# Patient Record
Sex: FEMALE | Race: WHITE | NOT HISPANIC OR LATINO | Employment: OTHER | URBAN - METROPOLITAN AREA
[De-identification: names, ages, dates, MRNs, and addresses within clinical notes are randomized per-mention and may not be internally consistent; named-entity substitution may affect disease eponyms.]

---

## 2017-01-02 DIAGNOSIS — E03.9 ACQUIRED HYPOTHYROIDISM: ICD-10-CM

## 2017-01-03 RX ORDER — LEVOTHYROXINE SODIUM 112 UG/1
TABLET ORAL
Qty: 30 TABLET | Refills: 0 | Status: SHIPPED | OUTPATIENT
Start: 2017-01-03 | End: 2017-02-08 | Stop reason: SDUPTHER

## 2017-02-01 DIAGNOSIS — E78.00 HYPERCHOLESTEROLEMIA: Primary | ICD-10-CM

## 2017-02-01 DIAGNOSIS — E03.9 ACQUIRED HYPOTHYROIDISM: ICD-10-CM

## 2017-02-01 DIAGNOSIS — E55.9 VITAMIN D DEFICIENCY: ICD-10-CM

## 2017-02-01 LAB
25(OH)D3+25(OH)D2 SERPL-MCNC: 32.5 NG/ML
ALBUMIN SERPL-MCNC: 4.4 G/DL (ref 3.5–5.2)
ALBUMIN/GLOB SERPL: 1.8 G/DL
ALP SERPL-CCNC: 81 U/L (ref 40–129)
ALT SERPL-CCNC: 20 U/L (ref 5–33)
AST SERPL-CCNC: 21 U/L (ref 5–32)
BASOPHILS # BLD AUTO: 0.05 10*3/MM3 (ref 0–0.2)
BASOPHILS NFR BLD AUTO: 0.6 % (ref 0–2)
BILIRUB SERPL-MCNC: 0.4 MG/DL (ref 0.2–1.2)
BUN SERPL-MCNC: 14 MG/DL (ref 8–23)
BUN/CREAT SERPL: 20.6 (ref 7–25)
CALCIUM SERPL-MCNC: 9.6 MG/DL (ref 8.8–10.5)
CHLORIDE SERPL-SCNC: 100 MMOL/L (ref 98–107)
CHOLEST SERPL-MCNC: 196 MG/DL (ref 0–200)
CO2 SERPL-SCNC: 28.1 MMOL/L (ref 22–29)
CREAT SERPL-MCNC: 0.68 MG/DL (ref 0.57–1)
EOSINOPHIL # BLD AUTO: 0.47 10*3/MM3 (ref 0.1–0.3)
EOSINOPHIL NFR BLD AUTO: 6 % (ref 0–4)
ERYTHROCYTE [DISTWIDTH] IN BLOOD BY AUTOMATED COUNT: 13.6 % (ref 11.5–14.5)
GLOBULIN SER CALC-MCNC: 2.5 GM/DL
GLUCOSE SERPL-MCNC: 94 MG/DL (ref 65–99)
HCT VFR BLD AUTO: 42.5 % (ref 37–47)
HDLC SERPL-MCNC: 74 MG/DL (ref 40–60)
HGB BLD-MCNC: 13.4 G/DL (ref 12–16)
IMM GRANULOCYTES # BLD: 0.04 10*3/MM3 (ref 0–0.03)
IMM GRANULOCYTES NFR BLD: 0.5 % (ref 0–0.5)
LDLC SERPL CALC-MCNC: 99 MG/DL (ref 0–100)
LDLC/HDLC SERPL: 1.34 {RATIO}
LYMPHOCYTES # BLD AUTO: 2.36 10*3/MM3 (ref 0.6–4.8)
LYMPHOCYTES NFR BLD AUTO: 30.2 % (ref 20–45)
MCH RBC QN AUTO: 28.3 PG (ref 27–31)
MCHC RBC AUTO-ENTMCNC: 31.5 G/DL (ref 31–37)
MCV RBC AUTO: 89.9 FL (ref 81–99)
MONOCYTES # BLD AUTO: 0.62 10*3/MM3 (ref 0–1)
MONOCYTES NFR BLD AUTO: 7.9 % (ref 3–8)
NEUTROPHILS # BLD AUTO: 4.28 10*3/MM3 (ref 1.5–8.3)
NEUTROPHILS NFR BLD AUTO: 54.8 % (ref 45–70)
NRBC BLD AUTO-RTO: 0 /100 WBC (ref 0–0)
PLATELET # BLD AUTO: 308 10*3/MM3 (ref 140–500)
POTASSIUM SERPL-SCNC: 4.6 MMOL/L (ref 3.5–5.2)
PROT SERPL-MCNC: 6.9 G/DL (ref 6–8.5)
RBC # BLD AUTO: 4.73 10*6/MM3 (ref 4.2–5.4)
SODIUM SERPL-SCNC: 142 MMOL/L (ref 136–145)
TRIGL SERPL-MCNC: 113 MG/DL (ref 0–150)
TSH SERPL DL<=0.005 MIU/L-ACNC: 1.16 MIU/ML (ref 0.27–4.2)
VLDLC SERPL CALC-MCNC: 22.6 MG/DL (ref 7–27)
WBC # BLD AUTO: 7.82 10*3/MM3 (ref 4.8–10.8)

## 2017-02-06 DIAGNOSIS — E03.9 ACQUIRED HYPOTHYROIDISM: ICD-10-CM

## 2017-02-07 RX ORDER — LEVOTHYROXINE SODIUM 112 UG/1
TABLET ORAL
Qty: 30 TABLET | Refills: 0 | OUTPATIENT
Start: 2017-02-07

## 2017-02-08 DIAGNOSIS — E03.9 ACQUIRED HYPOTHYROIDISM: ICD-10-CM

## 2017-02-08 RX ORDER — LEVOTHYROXINE SODIUM 112 UG/1
TABLET ORAL
Qty: 30 TABLET | Refills: 0 | Status: SHIPPED | OUTPATIENT
Start: 2017-02-08 | End: 2017-03-10 | Stop reason: SDUPTHER

## 2017-02-15 ENCOUNTER — OFFICE VISIT (OUTPATIENT)
Dept: FAMILY MEDICINE CLINIC | Facility: CLINIC | Age: 67
End: 2017-02-15

## 2017-02-15 VITALS
BODY MASS INDEX: 31.99 KG/M2 | RESPIRATION RATE: 16 BRPM | SYSTOLIC BLOOD PRESSURE: 124 MMHG | DIASTOLIC BLOOD PRESSURE: 66 MMHG | OXYGEN SATURATION: 99 % | WEIGHT: 192 LBS | TEMPERATURE: 98.4 F | HEART RATE: 70 BPM | HEIGHT: 65 IN

## 2017-02-15 DIAGNOSIS — E03.9 ACQUIRED HYPOTHYROIDISM: ICD-10-CM

## 2017-02-15 DIAGNOSIS — R42 DIZZINESS: ICD-10-CM

## 2017-02-15 DIAGNOSIS — I49.9 IRREGULAR HEART BEAT: ICD-10-CM

## 2017-02-15 DIAGNOSIS — E78.00 HYPERCHOLESTEROLEMIA: Primary | ICD-10-CM

## 2017-02-15 DIAGNOSIS — K21.00 GASTROESOPHAGEAL REFLUX DISEASE WITH ESOPHAGITIS: ICD-10-CM

## 2017-02-15 PROCEDURE — 93000 ELECTROCARDIOGRAM COMPLETE: CPT | Performed by: PHYSICIAN ASSISTANT

## 2017-02-15 PROCEDURE — 99214 OFFICE O/P EST MOD 30 MIN: CPT | Performed by: PHYSICIAN ASSISTANT

## 2017-02-15 RX ORDER — ESOMEPRAZOLE MAGNESIUM 40 MG/1
40 CAPSULE, DELAYED RELEASE ORAL
Qty: 90 CAPSULE | Refills: 3 | Status: SHIPPED | OUTPATIENT
Start: 2017-02-15 | End: 2017-08-16 | Stop reason: SDUPTHER

## 2017-02-15 NOTE — PROGRESS NOTES
"Subjective   Hilaria Klein is a 66 y.o. female.   Chief Complaint   Patient presents with   • Hypothyroidism   • Follow-up     Lab review       History of Present Illness   Hilaria is a 66 year old female since for hypothyroidism management.  She has lost 3 pounds since July 25, 2016. Doing well with her medications.  She has been complaint on her medications.    Hilaria had one episode of heart flutters,dizziness and felt nauseated last month.  States that she has not had any other symptoms since then.  She drinks 30 oz of coffee a day.  She has been taking her Nexium. Denied any shortness of air,vision changes,swelling of ankles, fatigue or current chest pain.  Appetite has been have been normal.  Sleep has been normal.        The following portions of the patient's history were reviewed and updated as appropriate: allergies, current medications, past family history, past medical history, past social history and past surgical history.    Review of Systems   Constitutional: Negative for chills, fatigue and fever.   Respiratory: Negative for cough, shortness of breath and wheezing.    Cardiovascular: Positive for palpitations. Negative for chest pain.   Gastrointestinal: Positive for nausea. Negative for constipation, diarrhea and vomiting.   Neurological: Positive for dizziness and light-headedness. Negative for headaches.   Psychiatric/Behavioral: Negative for sleep disturbance and suicidal ideas.   All other systems reviewed and are negative.    Vitals:    02/15/17 1425   BP: 124/66   BP Location: Right arm   Patient Position: Sitting   Cuff Size: Adult   Pulse: 70   Resp: 16   Temp: 98.4 °F (36.9 °C)   TempSrc: Oral   SpO2: 99%   Weight: 192 lb (87.1 kg)   Height: 65\" (165.1 cm)     BP Readings from Last 3 Encounters:   02/15/17 124/66   07/25/16 138/88   06/21/16 145/73       Wt Readings from Last 3 Encounters:   02/15/17 192 lb (87.1 kg)   07/25/16 195 lb 1.6 oz (88.5 kg)   06/21/16 200 lb (90.7 kg) "     Body mass index is 31.95 kg/(m^2).    Allergies   Allergen Reactions   • Penicillins    • Sulfa Antibiotics        Objective   Physical Exam   Constitutional: She is oriented to person, place, and time. Vital signs are normal. She appears well-developed and well-nourished.   HENT:   Head: Normocephalic and atraumatic.   Right Ear: Hearing, tympanic membrane, external ear and ear canal normal.   Left Ear: Hearing, tympanic membrane, external ear and ear canal normal.   Nose: Nose normal. Right sinus exhibits no maxillary sinus tenderness and no frontal sinus tenderness. Left sinus exhibits no maxillary sinus tenderness and no frontal sinus tenderness.   Mouth/Throat: Uvula is midline, oropharynx is clear and moist and mucous membranes are normal.   Eyes: Conjunctivae, EOM and lids are normal. Pupils are equal, round, and reactive to light.   Neck: Trachea normal and phonation normal. Neck supple. Carotid bruit is not present. No edema present. No thyromegaly present.   Cardiovascular: Normal rate, regular rhythm, S1 normal, S2 normal, normal heart sounds and normal pulses.    No murmur heard.  Pulmonary/Chest: Effort normal and breath sounds normal.   Abdominal: Soft. Normal appearance, normal aorta and bowel sounds are normal. There is no hepatomegaly. There is no tenderness.   Lymphadenopathy:     She has no cervical adenopathy.   Neurological: She is alert and oriented to person, place, and time.   Skin: Skin is warm, dry and intact.   Psychiatric: She has a normal mood and affect. Her speech is normal and behavior is normal. Judgment and thought content normal. Cognition and memory are normal.         ECG 12 Lead  Date/Time: 2/15/2017 3:03 PM  Performed by: LEE BLACK  Authorized by: LEE BLACK   Comparison: not compared with previous ECG   Previous ECG: no previous ECG available  Rhythm: sinus rhythm  Rate: normal  BPM: 61  Conduction: conduction normal  ST Segments: ST segments normal  T  Waves: T waves normal  QRS axis: normal  Clinical impression: normal ECG  Comments: Indication: Dizziness with irregular heartbeat  P/HI:  114/182 ms  QRS:  82 ms  QT/Qtc:  388/391 ms            Assessment/Plan   Hilaria was seen today for hypothyroidism and follow-up.    Diagnoses and all orders for this visit:    Hypercholesterolemia    Gastroesophageal reflux disease with esophagitis  -     esomeprazole (nexIUM) 40 MG capsule; Take 1 capsule by mouth Every Morning Before Breakfast.    Acquired hypothyroidism    Dizziness  -     ECG 12 Lead    Irregular heart beat  -     ECG 12 Lead    1.  Chronic hypercholesterolemia: I have discussed her lab work with her at today's office visit.  Her cholesterol readings have improved.  She was encouraged to continue eating healthy and exercising routinely.  Hilaria will return to office in 6 months for fasting blood work.  2.  Chronic GERD: She is doing well with her Nexium prescription.  I have refilled this to pharmacy.  I have asked Hilaria to decrease her caffeine intake.  She voiced understanding.  3.  Chronic acquired hypothyroidism: Discussed her TSH with her at today's office visit.  This is currently at goal.  She will continue her thyroid medication at home as directed.  No refills are required at this time.  Plan to repeat her TSH in one year.  4.  New dizziness with irregular heartbeat: Hilaria had one episode of dizziness, nausea with irregular heart rate approximately 4 weeks ago.  She is concerned that maybe she is developing cardiac issues.  She had a normal EKG at today's office visit.  I suspect this might have been related to waking up quickly from a dream.  We will monitor for now since no family history of CAD/MI.  Will return to office if symptoms reoccur.  I'll consider possible referral to cardiology in future if warranted.    No visits with results within 2 Week(s) from this visit.  Latest known visit with results is:    Orders Only on 02/01/2017    Component Date Value Ref Range Status   • WBC 02/01/2017 7.82  4.80 - 10.80 10*3/mm3 Final   • RBC 02/01/2017 4.73  4.20 - 5.40 10*6/mm3 Final   • Hemoglobin 02/01/2017 13.4  12.0 - 16.0 g/dL Final   • Hematocrit 02/01/2017 42.5  37.0 - 47.0 % Final   • MCV 02/01/2017 89.9  81.0 - 99.0 fL Final   • MCH 02/01/2017 28.3  27.0 - 31.0 pg Final   • MCHC 02/01/2017 31.5  31.0 - 37.0 g/dL Final   • RDW 02/01/2017 13.6  11.5 - 14.5 % Final   • Platelets 02/01/2017 308  140 - 500 10*3/mm3 Final   • Neutrophil Rel % 02/01/2017 54.8  45.0 - 70.0 % Final   • Lymphocyte Rel % 02/01/2017 30.2  20.0 - 45.0 % Final   • Monocyte Rel % 02/01/2017 7.9  3.0 - 8.0 % Final   • Eosinophil Rel % 02/01/2017 6.0* 0.0 - 4.0 % Final   • Basophil Rel % 02/01/2017 0.6  0.0 - 2.0 % Final   • Neutrophils Absolute 02/01/2017 4.28  1.50 - 8.30 10*3/mm3 Final   • Lymphocytes Absolute 02/01/2017 2.36  0.60 - 4.80 10*3/mm3 Final   • Monocytes Absolute 02/01/2017 0.62  0.00 - 1.00 10*3/mm3 Final   • Eosinophils Absolute 02/01/2017 0.47* 0.10 - 0.30 10*3/mm3 Final   • Basophils Absolute 02/01/2017 0.05  0.00 - 0.20 10*3/mm3 Final   • Immature Granulocyte Rel % 02/01/2017 0.5  0.0 - 0.5 % Final   • Immature Grans Absolute 02/01/2017 0.04* 0.00 - 0.03 10*3/mm3 Final   • nRBC 02/01/2017 0.0  0.0 - 0.0 /100 WBC Final   • Glucose 02/01/2017 94  65 - 99 mg/dL Final   • BUN 02/01/2017 14  8 - 23 mg/dL Final   • Creatinine 02/01/2017 0.68  0.57 - 1.00 mg/dL Final   • eGFR Non  Am 02/01/2017 87  >60 mL/min/1.73 Final   • eGFR African Am 02/01/2017 106  >60 mL/min/1.73 Final   • BUN/Creatinine Ratio 02/01/2017 20.6  7.0 - 25.0 Final   • Sodium 02/01/2017 142  136 - 145 mmol/L Final   • Potassium 02/01/2017 4.6  3.5 - 5.2 mmol/L Final   • Chloride 02/01/2017 100  98 - 107 mmol/L Final   • Total CO2 02/01/2017 28.1  22.0 - 29.0 mmol/L Final   • Calcium 02/01/2017 9.6  8.8 - 10.5 mg/dL Final   • Total Protein 02/01/2017 6.9  6.0 - 8.5 g/dL Final   • Albumin  02/01/2017 4.40  3.50 - 5.20 g/dL Final   • Globulin 02/01/2017 2.5  gm/dL Final   • A/G Ratio 02/01/2017 1.8  g/dL Final   • Total Bilirubin 02/01/2017 0.4  0.2 - 1.2 mg/dL Final   • Alkaline Phosphatase 02/01/2017 81  40 - 129 U/L Final   • AST (SGOT) 02/01/2017 21  5 - 32 U/L Final   • ALT (SGPT) 02/01/2017 20  5 - 33 U/L Final   • Total Cholesterol 02/01/2017 196  0 - 200 mg/dL Final   • Triglycerides 02/01/2017 113  0 - 150 mg/dL Final   • HDL Cholesterol 02/01/2017 74* 40 - 60 mg/dL Final   • VLDL Cholesterol 02/01/2017 22.6  7 - 27 mg/dL Final   • LDL Cholesterol  02/01/2017 99  0 - 100 mg/dL Final   • LDL/HDL Ratio 02/01/2017 1.34   Final   • TSH 02/01/2017 1.160  0.270 - 4.200 mIU/mL Final   • 25 Hydroxy, Vitamin D 02/01/2017 32.5  ng/mL Final    Comment: Reference Range for Total Vitamin D 25(OH)  Deficiency    <20.0 ng/mL  Insufficiency 21-29 ng/mL  Sufficiency   30-74 ng/mL             Dragon transcription disclaimer    Much of this encounter note is an electronic transcription/translation of spoken language to printed text.  The electronic translation of spoken language may permit erroneous, or at times, nonsensical words or phrases to be inadvertently transcribed.  Although I have reviewed the note for such errors, some may still exist.    Tennille Lebron PA-C  Family Practice

## 2017-02-16 PROBLEM — R42 DIZZINESS: Status: ACTIVE | Noted: 2017-02-16

## 2017-02-16 PROBLEM — I49.9 IRREGULAR HEART BEAT: Status: ACTIVE | Noted: 2017-02-16

## 2017-03-10 DIAGNOSIS — E03.9 ACQUIRED HYPOTHYROIDISM: ICD-10-CM

## 2017-03-13 RX ORDER — LEVOTHYROXINE SODIUM 112 UG/1
TABLET ORAL
Qty: 30 TABLET | Refills: 0 | Status: SHIPPED | OUTPATIENT
Start: 2017-03-13 | End: 2017-04-11 | Stop reason: SDUPTHER

## 2017-04-11 DIAGNOSIS — E03.9 ACQUIRED HYPOTHYROIDISM: ICD-10-CM

## 2017-04-12 RX ORDER — LEVOTHYROXINE SODIUM 112 UG/1
TABLET ORAL
Qty: 30 TABLET | Refills: 0 | Status: SHIPPED | OUTPATIENT
Start: 2017-04-12 | End: 2017-05-18 | Stop reason: SDUPTHER

## 2017-04-24 RX ORDER — SIMVASTATIN 40 MG
TABLET ORAL
Qty: 30 TABLET | Refills: 6 | Status: SHIPPED | OUTPATIENT
Start: 2017-04-24 | End: 2017-08-16 | Stop reason: SDUPTHER

## 2017-05-18 DIAGNOSIS — E03.9 ACQUIRED HYPOTHYROIDISM: ICD-10-CM

## 2017-05-19 RX ORDER — LEVOTHYROXINE SODIUM 112 UG/1
TABLET ORAL
Qty: 30 TABLET | Refills: 0 | Status: SHIPPED | OUTPATIENT
Start: 2017-05-19 | End: 2017-06-19 | Stop reason: SDUPTHER

## 2017-06-19 DIAGNOSIS — E03.9 ACQUIRED HYPOTHYROIDISM: ICD-10-CM

## 2017-06-19 RX ORDER — LEVOTHYROXINE SODIUM 112 UG/1
TABLET ORAL
Qty: 30 TABLET | Refills: 0 | Status: SHIPPED | OUTPATIENT
Start: 2017-06-19 | End: 2017-07-20 | Stop reason: SDUPTHER

## 2017-07-17 ENCOUNTER — TELEPHONE (OUTPATIENT)
Dept: FAMILY MEDICINE CLINIC | Facility: CLINIC | Age: 67
End: 2017-07-17

## 2017-07-17 DIAGNOSIS — E03.9 ACQUIRED HYPOTHYROIDISM: ICD-10-CM

## 2017-07-17 DIAGNOSIS — Z11.59 NEED FOR HEPATITIS C SCREENING TEST: ICD-10-CM

## 2017-07-17 DIAGNOSIS — E78.00 HYPERCHOLESTEROLEMIA: Primary | ICD-10-CM

## 2017-07-17 NOTE — TELEPHONE ENCOUNTER
Notify patient that I have placed her orders for fasting blood work to be performed at the hospital.  I have included hepatitis C.

## 2017-07-19 ENCOUNTER — LAB (OUTPATIENT)
Dept: LAB | Facility: HOSPITAL | Age: 67
End: 2017-07-19

## 2017-07-19 ENCOUNTER — TELEPHONE (OUTPATIENT)
Dept: FAMILY MEDICINE CLINIC | Facility: CLINIC | Age: 67
End: 2017-07-19

## 2017-07-19 DIAGNOSIS — E78.00 HYPERCHOLESTEROLEMIA: ICD-10-CM

## 2017-07-19 DIAGNOSIS — Z11.59 NEED FOR HEPATITIS C SCREENING TEST: ICD-10-CM

## 2017-07-19 DIAGNOSIS — E03.9 ACQUIRED HYPOTHYROIDISM: ICD-10-CM

## 2017-07-19 LAB
ALBUMIN SERPL-MCNC: 4.5 G/DL (ref 3.5–5.2)
ALBUMIN/GLOB SERPL: 1.5 G/DL
ALP SERPL-CCNC: 80 U/L (ref 40–129)
ALT SERPL W P-5'-P-CCNC: 24 U/L (ref 5–33)
ANION GAP SERPL CALCULATED.3IONS-SCNC: 13.1 MMOL/L
AST SERPL-CCNC: 22 U/L (ref 5–32)
BILIRUB SERPL-MCNC: 0.6 MG/DL (ref 0.2–1.2)
BUN BLD-MCNC: 11 MG/DL (ref 8–23)
BUN/CREAT SERPL: 13.9 (ref 7–25)
CALCIUM SPEC-SCNC: 9.3 MG/DL (ref 8.8–10.5)
CHLORIDE SERPL-SCNC: 101 MMOL/L (ref 98–107)
CHOLEST SERPL-MCNC: 165 MG/DL (ref 0–200)
CO2 SERPL-SCNC: 24.9 MMOL/L (ref 22–29)
CREAT BLD-MCNC: 0.79 MG/DL (ref 0.57–1)
GFR SERPL CREATININE-BSD FRML MDRD: 73 ML/MIN/1.73
GLOBULIN UR ELPH-MCNC: 3.1 GM/DL
GLUCOSE BLD-MCNC: 96 MG/DL (ref 65–99)
HCV AB SER DONR QL: NORMAL
HDLC SERPL-MCNC: 65 MG/DL (ref 40–60)
LDLC SERPL CALC-MCNC: 80 MG/DL (ref 0–100)
LDLC/HDLC SERPL: 1.24 {RATIO}
POTASSIUM BLD-SCNC: 4.5 MMOL/L (ref 3.5–5.2)
PROT SERPL-MCNC: 7.6 G/DL (ref 6–8.5)
SODIUM BLD-SCNC: 139 MMOL/L (ref 136–145)
TRIGL SERPL-MCNC: 98 MG/DL (ref 0–150)
TSH SERPL DL<=0.05 MIU/L-ACNC: 3.34 MIU/ML (ref 0.27–4.2)
VLDLC SERPL-MCNC: 19.6 MG/DL (ref 7–27)

## 2017-07-19 PROCEDURE — 80053 COMPREHEN METABOLIC PANEL: CPT

## 2017-07-19 PROCEDURE — 80061 LIPID PANEL: CPT

## 2017-07-19 PROCEDURE — 86803 HEPATITIS C AB TEST: CPT

## 2017-07-19 PROCEDURE — 84443 ASSAY THYROID STIM HORMONE: CPT

## 2017-07-19 NOTE — TELEPHONE ENCOUNTER
----- Message from Tennille Lebron PA-C sent at 7/19/2017  5:13 PM EDT -----  1.  Notify patient that her hepatitis C testing was nonreactive.  2.  TSH was normal at 3.3.  Please continue current dose of thyroid medication.  3.  Cholesterol 165, triglycerides 98, HDL 65 and LDL was 80.  Please continue current medication at home.  4.  Fasting blood sugar was normal at 96.  Please keep appointment in August.

## 2017-07-20 DIAGNOSIS — E03.9 ACQUIRED HYPOTHYROIDISM: ICD-10-CM

## 2017-07-20 RX ORDER — LEVOTHYROXINE SODIUM 112 UG/1
TABLET ORAL
Qty: 30 TABLET | Refills: 0 | Status: SHIPPED | OUTPATIENT
Start: 2017-07-20 | End: 2017-08-16 | Stop reason: SDUPTHER

## 2017-08-07 ENCOUNTER — TELEPHONE (OUTPATIENT)
Dept: FAMILY MEDICINE CLINIC | Facility: CLINIC | Age: 67
End: 2017-08-07

## 2017-08-07 NOTE — TELEPHONE ENCOUNTER
Just had labs done end of July.  Nothing needed at this time. Keep Appointment for OV in August.

## 2017-08-16 ENCOUNTER — OFFICE VISIT (OUTPATIENT)
Dept: FAMILY MEDICINE CLINIC | Facility: CLINIC | Age: 67
End: 2017-08-16

## 2017-08-16 VITALS
HEART RATE: 74 BPM | SYSTOLIC BLOOD PRESSURE: 142 MMHG | OXYGEN SATURATION: 100 % | HEIGHT: 65 IN | RESPIRATION RATE: 16 BRPM | WEIGHT: 184 LBS | BODY MASS INDEX: 30.66 KG/M2 | TEMPERATURE: 98.1 F | DIASTOLIC BLOOD PRESSURE: 78 MMHG

## 2017-08-16 DIAGNOSIS — E03.9 ACQUIRED HYPOTHYROIDISM: ICD-10-CM

## 2017-08-16 DIAGNOSIS — Z12.11 ENCOUNTER FOR SCREENING COLONOSCOPY: ICD-10-CM

## 2017-08-16 DIAGNOSIS — Z01.419 PAP SMEAR, AS PART OF ROUTINE GYNECOLOGICAL EXAMINATION: ICD-10-CM

## 2017-08-16 DIAGNOSIS — E78.00 HYPERCHOLESTEROLEMIA: ICD-10-CM

## 2017-08-16 DIAGNOSIS — Z12.31 SCREENING MAMMOGRAM, ENCOUNTER FOR: ICD-10-CM

## 2017-08-16 DIAGNOSIS — Z78.0 POST-MENOPAUSAL: ICD-10-CM

## 2017-08-16 DIAGNOSIS — Z00.00 ENCOUNTER FOR ANNUAL PHYSICAL EXAM: Primary | ICD-10-CM

## 2017-08-16 DIAGNOSIS — K21.00 GASTROESOPHAGEAL REFLUX DISEASE WITH ESOPHAGITIS: ICD-10-CM

## 2017-08-16 LAB
DEVELOPER EXPIRATION DATE: NORMAL
DEVELOPER LOT NUMBER: NORMAL
EXPIRATION DATE: NORMAL
FECAL OCCULT BLOOD SCREEN, POC: NEGATIVE
Lab: NORMAL
NEGATIVE CONTROL: NEGATIVE
POSITIVE CONTROL: POSITIVE

## 2017-08-16 PROCEDURE — 82274 ASSAY TEST FOR BLOOD FECAL: CPT | Performed by: PHYSICIAN ASSISTANT

## 2017-08-16 PROCEDURE — 99397 PER PM REEVAL EST PAT 65+ YR: CPT | Performed by: PHYSICIAN ASSISTANT

## 2017-08-16 RX ORDER — SIMVASTATIN 40 MG
40 TABLET ORAL NIGHTLY
Qty: 90 TABLET | Refills: 3 | Status: SHIPPED | OUTPATIENT
Start: 2017-08-16 | End: 2018-08-15 | Stop reason: SDUPTHER

## 2017-08-16 RX ORDER — LEVOTHYROXINE SODIUM 112 UG/1
112 TABLET ORAL DAILY
Qty: 90 TABLET | Refills: 3 | Status: SHIPPED | OUTPATIENT
Start: 2017-08-16 | End: 2018-08-15 | Stop reason: SDUPTHER

## 2017-08-16 RX ORDER — ESOMEPRAZOLE MAGNESIUM 40 MG/1
40 CAPSULE, DELAYED RELEASE ORAL
Qty: 90 CAPSULE | Refills: 3 | Status: SHIPPED | OUTPATIENT
Start: 2017-08-16 | End: 2018-08-15 | Stop reason: SDUPTHER

## 2017-08-16 NOTE — PROGRESS NOTES
"Subjective   Hilaria Klein is a 67 y.o. female.   Chief Complaint   Patient presents with   • Hyperlipidemia     management   • Hypothyroidism     management   • Annual Exam     pap       History of Present Illness     Hilaria  is a 67-year-old female who presents for annual physical examination with Pap smear, hypothyroidism and hyperlipidemia management.  Hilaria has lost 8 pounds since February last mammogram was August 2016.  Denied any breast mass/discharge,vaginal discharge,dysuria,URI symptoms,chest pain,shortness of air,wheezing,dizziness,or swelling of ankles.  Appetite has been healthier.  Sleep has been normal. She has been complaint on her medications.  Shingles shot was several years ago at [facility].  15, 2017. Feeling well and has no complaints. She can't remember when her last colonoscopy.  Bowel  movements are daily without dark black tarry stools.     The following portions of the patient's history were reviewed and updated as appropriate: allergies, current medications, past family history, past medical history, past social history and past surgical history.    Review of Systems   Constitutional: Negative.    HENT: Negative.    Eyes: Negative.    Respiratory: Negative.    Cardiovascular: Negative.    Gastrointestinal: Negative.    Endocrine: Negative.    Genitourinary: Negative.    Musculoskeletal: Negative.    Skin: Negative.    Allergic/Immunologic: Negative.    Neurological: Negative.    Hematological: Negative.    Psychiatric/Behavioral: Negative.    All other systems reviewed and are negative.    Vitals:    08/16/17 1354   BP: 142/78   BP Location: Right arm   Patient Position: Sitting   Cuff Size: Adult   Pulse: 74   Resp: 16   Temp: 98.1 °F (36.7 °C)   TempSrc: Oral   SpO2: 100%   Weight: 184 lb (83.5 kg)   Height: 65\" (165.1 cm)     Wt Readings from Last 3 Encounters:   08/16/17 184 lb (83.5 kg)   02/15/17 192 lb (87.1 kg)   07/25/16 195 lb 1.6 oz (88.5 kg)       BP Readings from Last 3 " Encounters:   08/16/17 142/78   02/15/17 124/66   07/25/16 138/88     Body mass index is 30.62 kg/(m^2).  Allergies   Allergen Reactions   • Penicillins    • Sulfa Antibiotics        Objective   Physical Exam   Constitutional: She is oriented to person, place, and time. Vital signs are normal. She appears well-developed and well-nourished.   HENT:   Head: Normocephalic and atraumatic.   Right Ear: Hearing, tympanic membrane, external ear and ear canal normal.   Left Ear: Hearing, tympanic membrane, external ear and ear canal normal.   Nose: Nose normal. Right sinus exhibits no maxillary sinus tenderness and no frontal sinus tenderness. Left sinus exhibits no maxillary sinus tenderness and no frontal sinus tenderness.   Mouth/Throat: Uvula is midline, oropharynx is clear and moist and mucous membranes are normal.   Eyes: Conjunctivae, EOM and lids are normal. Pupils are equal, round, and reactive to light.   Neck: Trachea normal and phonation normal. Neck supple. No edema present. No thyroid mass and no thyromegaly present.   Cardiovascular: Normal rate, regular rhythm, S1 normal, S2 normal, normal heart sounds and normal pulses.    Pulmonary/Chest: Effort normal and breath sounds normal. Right breast exhibits no inverted nipple, no mass, no nipple discharge, no skin change and no tenderness. Left breast exhibits no inverted nipple, no mass, no nipple discharge, no skin change and no tenderness. Breasts are symmetrical. There is no breast swelling.   Abdominal: Soft. Normal appearance, normal aorta and bowel sounds are normal. There is no hepatomegaly. There is no tenderness. Hernia confirmed negative in the right inguinal area and confirmed negative in the left inguinal area.   Genitourinary: Rectum normal, vagina normal and uterus normal. Rectal exam shows guaiac negative stool. No breast tenderness, discharge or bleeding. Pelvic exam was performed with patient supine. There is no rash, tenderness, lesion or injury  on the right labia. There is no rash, tenderness, lesion or injury on the left labia. Cervix exhibits no motion tenderness and no discharge. Right adnexum displays no mass, no tenderness and no fullness. Left adnexum displays no mass, no tenderness and no fullness.   Genitourinary Comments: Exam was chaperoned by Natali Shi LPN   Lymphadenopathy:     She has no cervical adenopathy.        Right: No inguinal adenopathy present.        Left: No inguinal adenopathy present.   Neurological: She is alert and oriented to person, place, and time. She has normal reflexes.   Skin: Skin is warm, dry and intact.   Psychiatric: She has a normal mood and affect. Her speech is normal and behavior is normal. Judgment and thought content normal. Cognition and memory are normal.     Results for orders placed or performed in visit on 08/16/17   POC Occult Blood Stool   Result Value Ref Range    Fecal Occult Blood Negative Negative    Lot Number [id number]     Expiration Date 7746640     DEVELOPER LOT NUMBER [id number]     DEVELOPER EXPIRATION DATE 6184553     Positive Control Positive Positive    Negative Control Negative Negative     Assessment/Plan   Hilaria was seen today for hyperlipidemia, hypothyroidism and annual exam.    Diagnoses and all orders for this visit:    Encounter for annual physical exam    Pap smear, as part of routine gynecological examination  -     Cancel: POC Occult Blood Stool  -     Pap IG, Rfx HPV All Pth  -     POC Occult Blood Stool    Acquired hypothyroidism  -     levothyroxine (SYNTHROID, LEVOTHROID) 112 MCG tablet; Take 1 tablet by mouth Daily.    Gastroesophageal reflux disease with esophagitis  -     esomeprazole (nexIUM) 40 MG capsule; Take 1 capsule by mouth Every Morning Before Breakfast.  -     Ambulatory Referral to Gastroenterology    Hypercholesterolemia  -     simvastatin (ZOCOR) 40 MG tablet; Take 1 tablet by mouth Every Night.    Screening mammogram, encounter for  -     Mammo Screening  Bilateral With CAD; Future    Encounter for screening colonoscopy  -     Ambulatory Referral to Gastroenterology    Post-menopausal  -     DEXA Bone Density Axial; Future      1.  Annual physical examination with Pap smear: I have reviewed her lab work with her at today's office visit.  Her in office Hemoccult was negative.  Pap smear was collected and sent to the laboratory for further evaluation.  Hilaria will be notified of test results when completed.  2.  Stable hypothyroidism: I have reviewed her TSH results with her at today's office visit.  I have refilled her Synthroid medication to pharmacy.  She will return to office in 6 months for reevaluation.  3.  Chronic GERD: Doing well with her Nexium prescription.  I have refilled this to pharmacy.  I will schedule a referral to GI specialist, Dr. Elena Shabazz for screening colonoscopy.  4.  Stable hypercholesterolemia: Doing well with her Zocor prescription.  I have sent this to pharmacy.  Her lipid profile results were discussed and have improved.  She was given praise for weight loss and eating healthy.  Plan to repeat fasting blood work in 6 months.  5.  Need for screening mammogram and DEXA scan: Hilaria is postmenopausal.  I have placed a referral for mammogram and DEXA scan to performed at [facility].  She will be notified of test results when completed.          Office Visit on 08/16/2017   Component Date Value Ref Range Status   • Fecal Occult Blood 08/16/2017 Negative  Negative Final   • Lot Number 08/16/2017 [id number]   Final   • Expiration Date 08/16/2017 1213905   Final   • DEVELOPER LOT NUMBER 08/16/2017 [id number]   Final   • DEVELOPER EXPIRATION DATE 08/16/2017 [id number]   Final   • Positive Control 08/16/2017 Positive  Positive Final   • Negative Control 08/16/2017 Negative  Negative Final     Dragon transcription disclaimer    Much of this encounter note is an electronic transcription/translation of spoken language to printed text.  The  electronic translation of spoken language may permit erroneous, or at times, nonsensical words or phrases to be inadvertently transcribed.  Although I have reviewed the note for such errors, some may still exist.    Tennille Lebron PA-C  Family Practice

## 2017-08-18 ENCOUNTER — TELEPHONE (OUTPATIENT)
Dept: FAMILY MEDICINE CLINIC | Facility: CLINIC | Age: 67
End: 2017-08-18

## 2017-08-18 LAB
CONV .: NORMAL
CYTOLOGIST CVX/VAG CYTO: NORMAL
CYTOLOGY CVX/VAG DOC THIN PREP: NORMAL
DX ICD CODE: NORMAL
HIV 1 & 2 AB SER-IMP: NORMAL
OTHER STN SPEC: NORMAL
PATH REPORT.FINAL DX SPEC: NORMAL
STAT OF ADQ CVX/VAG CYTO-IMP: NORMAL

## 2017-08-18 NOTE — TELEPHONE ENCOUNTER
----- Message from Tennille Lebron PA-C sent at 8/18/2017 11:07 AM EDT -----  Notify patient that Pap smear was normal.

## 2017-08-23 ENCOUNTER — APPOINTMENT (OUTPATIENT)
Dept: BONE DENSITY | Facility: HOSPITAL | Age: 67
End: 2017-08-23

## 2017-08-23 ENCOUNTER — HOSPITAL ENCOUNTER (OUTPATIENT)
Dept: MAMMOGRAPHY | Facility: HOSPITAL | Age: 67
Discharge: HOME | End: 2017-08-23
Admitting: PHYSICIAN ASSISTANT

## 2017-08-23 DIAGNOSIS — Z78.0 POST-MENOPAUSAL: ICD-10-CM

## 2017-08-23 DIAGNOSIS — Z12.31 SCREENING MAMMOGRAM, ENCOUNTER FOR: ICD-10-CM

## 2017-08-23 PROCEDURE — 77063 BREAST TOMOSYNTHESIS BI: CPT

## 2017-08-23 PROCEDURE — G0202 SCR MAMMO BI INCL CAD: HCPCS

## 2017-08-23 PROCEDURE — 77080 DXA BONE DENSITY AXIAL: CPT

## 2017-08-24 ENCOUNTER — TELEPHONE (OUTPATIENT)
Dept: FAMILY MEDICINE CLINIC | Facility: CLINIC | Age: 67
End: 2017-08-24

## 2017-08-24 NOTE — TELEPHONE ENCOUNTER
----- Message from Tennille Lebron PA-C sent at 8/24/2017  5:23 AM EDT -----  Notify patient that her DEXA Scan was normal.

## 2017-08-24 NOTE — TELEPHONE ENCOUNTER
----- Message from Tennille Lebron PA-C sent at 8/24/2017  5:23 AM EDT -----  Notify patient that her mammogram was normal.  Repeat in 1 year.

## 2017-08-30 ENCOUNTER — OFFICE VISIT (OUTPATIENT)
Dept: GASTROENTEROLOGY | Facility: CLINIC | Age: 67
End: 2017-08-30

## 2017-08-30 VITALS
SYSTOLIC BLOOD PRESSURE: 136 MMHG | DIASTOLIC BLOOD PRESSURE: 76 MMHG | BODY MASS INDEX: 30.72 KG/M2 | HEIGHT: 65 IN | WEIGHT: 184.4 LBS

## 2017-08-30 DIAGNOSIS — Z12.11 ENCOUNTER FOR SCREENING FOR MALIGNANT NEOPLASM OF COLON: ICD-10-CM

## 2017-08-30 DIAGNOSIS — K21.00 GASTROESOPHAGEAL REFLUX DISEASE WITH ESOPHAGITIS: Primary | ICD-10-CM

## 2017-08-30 PROCEDURE — 99203 OFFICE O/P NEW LOW 30 MIN: CPT | Performed by: INTERNAL MEDICINE

## 2017-08-30 NOTE — PROGRESS NOTES
PATIENT INFORMATION  Hilaria Klein       - 1950    CHIEF COMPLAINT  Chief Complaint   Patient presents with   • Heartburn       HISTORY OF PRESENT ILLNESS  Heartburn     66 yo with GERD for over 10 years currently maintained on Nexium. She denies any dysphagia or odynophagia. No weight loss. No previous EGD. Symptoms aggravated only be eating late. Severity currently is mild. She is also needing cls. Last cls was over 5 years ago and polyps were noter.    No cardiac or pulmonary issues.    REVIEW OF SYSTEMS  Review of Systems   Gastrointestinal:        REFLUX   All other systems reviewed and are negative.        ACTIVE PROBLEMS  Patient Active Problem List    Diagnosis   • Screening mammogram, encounter for [Z12.31]   • Encounter for screening colonoscopy [Z12.11]   • Post-menopausal [Z78.0]   • Irregular heart beat [I49.9]   • Dizziness [R42]   • Gastroesophageal reflux disease with esophagitis [K21.0]   • Hypercholesterolemia [E78.00]   • Acquired hypothyroidism [E03.9]   • Pruritus of vagina [L29.8]   • Vitamin D deficiency [E55.9]   • Encounter for annual physical exam [Z00.00]   • Pap smear, as part of routine gynecological examination [Z01.419]   • Neoplasm of uncertain behavior [D48.9]         PAST MEDICAL HISTORY  Past Medical History:   Diagnosis Date   • Goiter          SURGICAL HISTORY  Past Surgical History:   Procedure Laterality Date   • APPENDECTOMY           FAMILY HISTORY  Family History   Problem Relation Age of Onset   • Hypertension Other    • Deep vein thrombosis Other    • Breast cancer Neg Hx          SOCIAL HISTORY  Social History     Occupational History   • Not on file.     Social History Main Topics   • Smoking status: Never Smoker   • Smokeless tobacco: Not on file   • Alcohol use No   • Drug use: Not on file   • Sexual activity: Not on file         CURRENT MEDICATIONS    Current Outpatient Prescriptions:   •  Cholecalciferol (VITAMIN D3) 5000 units capsule capsule, Take  "5,000 Units by mouth Daily., Disp: , Rfl:   •  esomeprazole (nexIUM) 40 MG capsule, Take 1 capsule by mouth Every Morning Before Breakfast., Disp: 90 capsule, Rfl: 3  •  levothyroxine (SYNTHROID, LEVOTHROID) 112 MCG tablet, Take 1 tablet by mouth Daily., Disp: 90 tablet, Rfl: 3  •  Multiple Vitamin (MULTI VITAMIN DAILY PO), Take  by mouth., Disp: , Rfl:   •  simvastatin (ZOCOR) 40 MG tablet, Take 1 tablet by mouth Every Night., Disp: 90 tablet, Rfl: 3    ALLERGIES  Penicillins and Sulfa antibiotics    VITALS  Vitals:    08/30/17 1422   BP: 136/76   Weight: 184 lb 6.4 oz (83.6 kg)   Height: 65\" (165.1 cm)       LAST RESULTS   Office Visit on 08/16/2017   Component Date Value Ref Range Status   • Diagnosis 08/16/2017 Comment   Final    NEGATIVE FOR INTRAEPITHELIAL LESION AND MALIGNANCY.   • Specimen adequacy: 08/16/2017 Comment   Final    Comment: Satisfactory for evaluation.  Endocervical component may not be  distinguished in cases of atrophy.     • Clinician Provided ICD-10: 08/16/2017 Comment   Final    Z01.419   • Performed by: 08/16/2017 Comment   Final    Jose Guadalupe Farley, Cytotechnologist (ASCP)   • . 08/16/2017 .   Final   • Note: 08/16/2017 Comment   Final    Comment: The Pap smear is a screening test designed to aid in the detection of  premalignant and malignant conditions of the uterine cervix.  It is not a  diagnostic procedure and should not be used as the sole means of detecting  cervical cancer.  Both false-positive and false-negative reports do occur.     • Method: 08/16/2017 Comment   Final    Comment: This liquid based ThinPrep(R) pap test was screened with the  use of an image guided system.     • Conv .conv 08/16/2017 Comment   Final    Comment: The HPV DNA reflex criteria were not met with this specimen result  therefore, no HPV testing was performed.     • Fecal Occult Blood 08/16/2017 Negative  Negative Final   • Lot Number 08/16/2017 [id number]   Final   • Expiration Date 08/16/2017 [id number]   Final "   • DEVELOPER LOT NUMBER 08/16/2017 [id number]   Final   • DEVELOPER EXPIRATION DATE 08/16/2017 [id number]   Final   • Positive Control 08/16/2017 Positive  Positive Final   • Negative Control 08/16/2017 Negative  Negative Final     Dexa Bone Density Axial    Result Date: 8/23/2017  Narrative: DEXA BONE DENSITY AXIAL-: 8/23/2017 3:12 PM  CLINICAL HISTORY: Routine screening in a postmenopausal female.  FINDINGS: The total bone mineral density of the lumbar spine (L1-L4) is calculated at  1.221 g/cm2.  This correlates with a T-score of 1.6 and a Z-score of 3.4.  This is classified as normal bone mineralization. 16.9% increase since 2011.  The bone mineral density of the proximal left femoral neck is calculated at  0.823 g/cm2.  This correlates with a T-score of -0.2 and a Z-score of 1.3.  This is classified as normal bone mineralization. 6.4% decrease since 2011.      Impression: Normal bone mineralization .  This report was finalized on 8/23/2017 3:38 PM by Dr. Chapin Landa MD.      Mammo Screening Digital Tomosynthesis Bilateral With Cad    Result Date: 8/23/2017  Narrative: EXAM, 8/23/2017 2:15 PM: 1. Bilateral digital screening mammogram with CAD. 2. Bilateral digital screening breast tomosynthesis.  INDICATION: 67 years-old asymptomatic female with no personal or family history of breast cancer.  TECHNIQUE: Bilateral digital screening mammogram images were obtained and reviewed with CAD. Digital breast tomosynthesis images were also reviewed.  FINDINGS:  The breast tissues are generally fatty replaced.  No suspicious mass, microcalcifications, or architectural distortion. The exam is compared to prior mammogram(s) dated 08/12/2016.      Impression: Negative mammogram.  BI-RADS CATEGORY 1: Negative.  RECOMMENDATIONS: Annual screening mammogram.  Women over the age of 40 undergoing screening mammography are entered into a reminder system with target due date for the next mammogram.  This report was finalized on 8/23/2017  2:37 PM by Dr. Chapin Landa MD.        PHYSICAL EXAM  Physical Exam   Constitutional: She is oriented to person, place, and time. She appears well-developed and well-nourished. No distress.   HENT:   Head: Normocephalic and atraumatic.   Mouth/Throat: Oropharynx is clear and moist.   Eyes: EOM are normal. Pupils are equal, round, and reactive to light.   Neck: Normal range of motion. No tracheal deviation present.   Cardiovascular: Normal rate, regular rhythm, normal heart sounds and intact distal pulses.  Exam reveals no gallop and no friction rub.    No murmur heard.  Pulmonary/Chest: Effort normal and breath sounds normal. No stridor. No respiratory distress. She has no wheezes. She has no rales. She exhibits no tenderness.   Abdominal: Soft. Bowel sounds are normal. She exhibits no distension. There is no tenderness. There is no rebound and no guarding.   Musculoskeletal: She exhibits no edema.   Lymphadenopathy:     She has no cervical adenopathy.   Neurological: She is alert and oriented to person, place, and time.   Skin: Skin is warm. She is not diaphoretic.   Psychiatric: She has a normal mood and affect. Her behavior is normal. Judgment and thought content normal.   Nursing note and vitals reviewed.      ASSESSMENT  Diagnoses and all orders for this visit:    Gastroesophageal reflux disease with esophagitis  -     Case Request; Standing  -     Case Request    Encounter for screening for malignant neoplasm of colon  -     Case Request; Standing  -     Case Request    Other orders  -     Implement Anesthesia orders day of procedure.; Standing  -     Obtain informed consent; Standing          PLAN  No Follow-up on file.      Antireflux measures and dietary modifications reviewed. Low acid diet reviewed. Keep head of bed elevated. Stop eating/drinking at least 3 hours prior to bedtime. Eliminate caffeine and carbonated beverages.  Weight loss encouraged if BMI over 25.      Risks, benefits and alternatives  discussed including but not limited to the complications of bleeding, perforation and sedation related problems.

## 2017-10-27 ENCOUNTER — ANESTHESIA EVENT (OUTPATIENT)
Dept: PERIOP | Facility: HOSPITAL | Age: 67
End: 2017-10-27

## 2017-10-30 ENCOUNTER — ANESTHESIA (OUTPATIENT)
Dept: PERIOP | Facility: HOSPITAL | Age: 67
End: 2017-10-30

## 2017-10-30 ENCOUNTER — HOSPITAL ENCOUNTER (OUTPATIENT)
Facility: HOSPITAL | Age: 67
Setting detail: HOSPITAL OUTPATIENT SURGERY
Discharge: HOME | End: 2017-10-30
Attending: INTERNAL MEDICINE | Admitting: INTERNAL MEDICINE

## 2017-10-30 VITALS
TEMPERATURE: 97.5 F | SYSTOLIC BLOOD PRESSURE: 151 MMHG | HEART RATE: 71 BPM | HEIGHT: 65 IN | BODY MASS INDEX: 29.96 KG/M2 | DIASTOLIC BLOOD PRESSURE: 81 MMHG | RESPIRATION RATE: 16 BRPM | OXYGEN SATURATION: 96 % | WEIGHT: 179.8 LBS

## 2017-10-30 DIAGNOSIS — K21.00 GASTROESOPHAGEAL REFLUX DISEASE WITH ESOPHAGITIS: ICD-10-CM

## 2017-10-30 DIAGNOSIS — Z12.11 ENCOUNTER FOR SCREENING FOR MALIGNANT NEOPLASM OF COLON: ICD-10-CM

## 2017-10-30 PROCEDURE — 43239 EGD BIOPSY SINGLE/MULTIPLE: CPT | Performed by: INTERNAL MEDICINE

## 2017-10-30 PROCEDURE — 45380 COLONOSCOPY AND BIOPSY: CPT | Performed by: INTERNAL MEDICINE

## 2017-10-30 PROCEDURE — 25010000002 PROPOFOL 10 MG/ML EMULSION: Performed by: NURSE ANESTHETIST, CERTIFIED REGISTERED

## 2017-10-30 RX ORDER — SODIUM CHLORIDE 0.9 % (FLUSH) 0.9 %
1-10 SYRINGE (ML) INJECTION AS NEEDED
Status: DISCONTINUED | OUTPATIENT
Start: 2017-10-30 | End: 2017-10-30 | Stop reason: HOSPADM

## 2017-10-30 RX ORDER — GLYCOPYRROLATE 0.2 MG/ML
INJECTION INTRAMUSCULAR; INTRAVENOUS AS NEEDED
Status: DISCONTINUED | OUTPATIENT
Start: 2017-10-30 | End: 2017-10-30 | Stop reason: SURG

## 2017-10-30 RX ORDER — SODIUM CHLORIDE 9 MG/ML
40 INJECTION, SOLUTION INTRAVENOUS AS NEEDED
Status: DISCONTINUED | OUTPATIENT
Start: 2017-10-30 | End: 2017-10-30 | Stop reason: HOSPADM

## 2017-10-30 RX ORDER — SODIUM CHLORIDE, SODIUM LACTATE, POTASSIUM CHLORIDE, CALCIUM CHLORIDE 600; 310; 30; 20 MG/100ML; MG/100ML; MG/100ML; MG/100ML
9 INJECTION, SOLUTION INTRAVENOUS CONTINUOUS PRN
Status: DISCONTINUED | OUTPATIENT
Start: 2017-10-30 | End: 2017-10-30 | Stop reason: HOSPADM

## 2017-10-30 RX ORDER — PROPOFOL 10 MG/ML
VIAL (ML) INTRAVENOUS AS NEEDED
Status: DISCONTINUED | OUTPATIENT
Start: 2017-10-30 | End: 2017-10-30 | Stop reason: SURG

## 2017-10-30 RX ORDER — PROPOFOL 10 MG/ML
VIAL (ML) INTRAVENOUS CONTINUOUS PRN
Status: DISCONTINUED | OUTPATIENT
Start: 2017-10-30 | End: 2017-10-30 | Stop reason: SURG

## 2017-10-30 RX ORDER — LIDOCAINE HYDROCHLORIDE 20 MG/ML
INJECTION, SOLUTION INFILTRATION; PERINEURAL AS NEEDED
Status: DISCONTINUED | OUTPATIENT
Start: 2017-10-30 | End: 2017-10-30 | Stop reason: SURG

## 2017-10-30 RX ORDER — LIDOCAINE HYDROCHLORIDE 10 MG/ML
0.5 INJECTION, SOLUTION EPIDURAL; INFILTRATION; INTRACAUDAL; PERINEURAL ONCE AS NEEDED
Status: COMPLETED | OUTPATIENT
Start: 2017-10-30 | End: 2017-10-30

## 2017-10-30 RX ADMIN — SODIUM CHLORIDE, POTASSIUM CHLORIDE, SODIUM LACTATE AND CALCIUM CHLORIDE: 600; 310; 30; 20 INJECTION, SOLUTION INTRAVENOUS at 10:06

## 2017-10-30 RX ADMIN — PROPOFOL 50 MG: 10 INJECTION, EMULSION INTRAVENOUS at 10:20

## 2017-10-30 RX ADMIN — LIDOCAINE HYDROCHLORIDE 50 MG: 20 INJECTION, SOLUTION INFILTRATION; PERINEURAL at 10:08

## 2017-10-30 RX ADMIN — PROPOFOL 50 MG: 10 INJECTION, EMULSION INTRAVENOUS at 10:15

## 2017-10-30 RX ADMIN — PROPOFOL 50 MG: 10 INJECTION, EMULSION INTRAVENOUS at 10:26

## 2017-10-30 RX ADMIN — LIDOCAINE HYDROCHLORIDE 0.1 ML: 10 INJECTION, SOLUTION EPIDURAL; INFILTRATION; INTRACAUDAL; PERINEURAL at 09:40

## 2017-10-30 RX ADMIN — SODIUM CHLORIDE, POTASSIUM CHLORIDE, SODIUM LACTATE AND CALCIUM CHLORIDE 9 ML/HR: 600; 310; 30; 20 INJECTION, SOLUTION INTRAVENOUS at 09:40

## 2017-10-30 RX ADMIN — PROPOFOL 50 MG: 10 INJECTION, EMULSION INTRAVENOUS at 10:08

## 2017-10-30 RX ADMIN — PROPOFOL 50 MG: 10 INJECTION, EMULSION INTRAVENOUS at 10:31

## 2017-10-30 RX ADMIN — GLYCOPYRROLATE 0.1 MG: 0.2 INJECTION INTRAMUSCULAR; INTRAVENOUS at 10:10

## 2017-10-30 RX ADMIN — PROPOFOL 100 MCG/KG/MIN: 10 INJECTION, EMULSION INTRAVENOUS at 10:07

## 2017-10-30 NOTE — ANESTHESIA POSTPROCEDURE EVALUATION
Patient: Hilaria Klein    Procedure Summary     Date Anesthesia Start Anesthesia Stop Room / Location    10/30/17 1006 1040 [facility] / [facility]       Procedure Diagnosis Surgeon Provider    ESOPHAGOGASTRODUODENOSCOPY WITH BIOPSY (N/A Esophagus); COLONOSCOPY; POLYPECTOMY (N/A ) Encounter for screening for malignant neoplasm of colon; Gastroesophageal reflux disease with esophagitis  (Encounter for screening for malignant neoplasm of colon [Z12.11]; Gastroesophageal reflux disease with esophagitis [K21.0]) MD Enrike Garcia CRNA          Anesthesia Type: MAC  Last vitals  BP   130/75 (10/30/17 1050)   Temp   97.5 °F (36.4 °C) (10/30/17 1047)   Pulse   71 (10/30/17 1050)   Resp   20 (10/30/17 1050)     SpO2   94 % (10/30/17 1050)     Post Anesthesia Care and Evaluation    Patient location during evaluation: bedside  Patient participation: complete - patient participated  Level of consciousness: awake and alert  Pain management: adequate  Airway patency: patent  Anesthetic complications: No anesthetic complications  PONV Status: controlled  Cardiovascular status: acceptable  Respiratory status: acceptable  Hydration status: acceptable

## 2017-10-30 NOTE — ANESTHESIA PREPROCEDURE EVALUATION
Anesthesia Evaluation     Patient summary reviewed and Nursing notes reviewed   no history of anesthetic complications:  NPO Solid Status: > 8 hours  NPO Liquid Status: > 8 hours     Airway   Mallampati: II  TM distance: >3 FB  Neck ROM: full  no difficulty expected  Dental      Pulmonary - negative pulmonary ROS    breath sounds clear to auscultation  Cardiovascular   Exercise tolerance: good (4-7 METS)    ECG reviewed  Rhythm: regular  Rate: normal    (+) hyperlipidemia    ROS comment: Narrative     Tennille Lebron PA-C     2/16/2017  4:56 AM    ECG 12 Lead  Date/Time: 2/15/2017 3:03 PM  Performed by: TENNILLE LEBRON  Authorized by: TENNILLE LEBRON   Comparison: not compared with previous ECG   Previous ECG: no previous ECG available  Rhythm: sinus rhythm  Rate: normal  BPM: 61  Conduction: conduction normal  ST Segments: ST segments normal  T Waves: T waves normal  QRS axis: normal  Clinical impression: normal ECG  Comments: Indication: Dizziness with irregular heartbeat  P/ME:  114/182 ms  QRS:  82 ms  QT/Qtc:  388/391 ms        Neuro/Psych- negative ROS  GI/Hepatic/Renal/Endo    (+) obesity,  GERD well controlled, hypothyroidism,     Musculoskeletal     Abdominal   (+) obese,    Substance History - negative use     OB/GYN          Other   (+) arthritis   history of cancer (skin on nose)                                    Anesthesia Plan    ASA 2     MAC     intravenous induction   Anesthetic plan and risks discussed with patient.  Use of blood products discussed with patient  Consented to blood products.

## 2017-11-01 LAB
LAB AP CASE REPORT: NORMAL
Lab: NORMAL
PATH REPORT.FINAL DX SPEC: NORMAL

## 2017-11-22 PROBLEM — K63.5 HYPERPLASTIC POLYP OF SIGMOID COLON: Status: ACTIVE | Noted: 2017-11-22

## 2018-08-09 ENCOUNTER — TELEPHONE (OUTPATIENT)
Dept: FAMILY MEDICINE CLINIC | Facility: CLINIC | Age: 68
End: 2018-08-09

## 2018-08-09 NOTE — TELEPHONE ENCOUNTER
Call patient to schedule appointment.  Last seen August 2017.  Is she currently using oxygen and how much/why?

## 2018-08-15 DIAGNOSIS — E78.00 HYPERCHOLESTEROLEMIA: ICD-10-CM

## 2018-08-15 DIAGNOSIS — E03.9 ACQUIRED HYPOTHYROIDISM: ICD-10-CM

## 2018-08-15 DIAGNOSIS — K21.00 GASTROESOPHAGEAL REFLUX DISEASE WITH ESOPHAGITIS: ICD-10-CM

## 2018-08-15 RX ORDER — SIMVASTATIN 40 MG
40 TABLET ORAL NIGHTLY
Qty: 15 TABLET | Refills: 0 | Status: SHIPPED | OUTPATIENT
Start: 2018-08-15 | End: 2018-08-30 | Stop reason: SDUPTHER

## 2018-08-15 RX ORDER — LEVOTHYROXINE SODIUM 112 UG/1
112 TABLET ORAL DAILY
Qty: 15 TABLET | Refills: 0 | Status: SHIPPED | OUTPATIENT
Start: 2018-08-15 | End: 2018-08-30 | Stop reason: DRUGHIGH

## 2018-08-15 RX ORDER — ESOMEPRAZOLE MAGNESIUM 40 MG/1
40 CAPSULE, DELAYED RELEASE ORAL
Qty: 15 CAPSULE | Refills: 0 | Status: SHIPPED | OUTPATIENT
Start: 2018-08-15 | End: 2018-08-30 | Stop reason: SDUPTHER

## 2018-08-20 DIAGNOSIS — E55.9 VITAMIN D DEFICIENCY: ICD-10-CM

## 2018-08-20 DIAGNOSIS — E03.9 ACQUIRED HYPOTHYROIDISM: ICD-10-CM

## 2018-08-20 DIAGNOSIS — E78.00 HYPERCHOLESTEROLEMIA: Primary | ICD-10-CM

## 2018-08-20 DIAGNOSIS — Z00.00 ROUTINE ADULT HEALTH MAINTENANCE: ICD-10-CM

## 2018-08-22 LAB
25(OH)D3+25(OH)D2 SERPL-MCNC: 41.6 NG/ML
ALBUMIN SERPL-MCNC: 4.4 G/DL (ref 3.5–5.2)
ALBUMIN/GLOB SERPL: 1.6 G/DL
ALP SERPL-CCNC: 89 U/L (ref 40–129)
ALT SERPL-CCNC: 25 U/L (ref 5–33)
AST SERPL-CCNC: 24 U/L (ref 5–32)
BASOPHILS # BLD AUTO: 0.05 10*3/MM3 (ref 0–0.2)
BASOPHILS NFR BLD AUTO: 0.7 % (ref 0–2)
BILIRUB SERPL-MCNC: 0.6 MG/DL (ref 0.2–1.2)
BUN SERPL-MCNC: 11 MG/DL (ref 8–23)
BUN/CREAT SERPL: 17.2 (ref 7–25)
CALCIUM SERPL-MCNC: 9.3 MG/DL (ref 8.8–10.5)
CHLORIDE SERPL-SCNC: 103 MMOL/L (ref 98–107)
CHOLEST SERPL-MCNC: 186 MG/DL (ref 0–200)
CO2 SERPL-SCNC: 28 MMOL/L (ref 22–29)
CREAT SERPL-MCNC: 0.64 MG/DL (ref 0.57–1)
EOSINOPHIL # BLD AUTO: 0.3 10*3/MM3 (ref 0.1–0.3)
EOSINOPHIL NFR BLD AUTO: 4 % (ref 0–4)
ERYTHROCYTE [DISTWIDTH] IN BLOOD BY AUTOMATED COUNT: 12.8 % (ref 11.5–14.5)
GLOBULIN SER CALC-MCNC: 2.7 GM/DL
GLUCOSE SERPL-MCNC: 95 MG/DL (ref 65–99)
HCT VFR BLD AUTO: 46 % (ref 37–47)
HDLC SERPL-MCNC: 63 MG/DL (ref 40–60)
HGB BLD-MCNC: 14.6 G/DL (ref 12–16)
IMM GRANULOCYTES # BLD: 0.05 10*3/MM3 (ref 0–0.03)
IMM GRANULOCYTES NFR BLD: 0.7 % (ref 0–0.5)
LDLC SERPL CALC-MCNC: 99 MG/DL (ref 0–100)
LDLC/HDLC SERPL: 1.58 {RATIO}
LYMPHOCYTES # BLD AUTO: 2.45 10*3/MM3 (ref 0.6–4.8)
LYMPHOCYTES NFR BLD AUTO: 32.4 % (ref 20–45)
MCH RBC QN AUTO: 28.5 PG (ref 27–31)
MCHC RBC AUTO-ENTMCNC: 31.7 G/DL (ref 31–37)
MCV RBC AUTO: 89.8 FL (ref 81–99)
MONOCYTES # BLD AUTO: 0.6 10*3/MM3 (ref 0–1)
MONOCYTES NFR BLD AUTO: 7.9 % (ref 3–8)
NEUTROPHILS # BLD AUTO: 4.11 10*3/MM3 (ref 1.5–8.3)
NEUTROPHILS NFR BLD AUTO: 54.3 % (ref 45–70)
NRBC BLD AUTO-RTO: 0 /100 WBC (ref 0–0)
PLATELET # BLD AUTO: 334 10*3/MM3 (ref 140–500)
POTASSIUM SERPL-SCNC: 4.7 MMOL/L (ref 3.5–5.2)
PROT SERPL-MCNC: 7.1 G/DL (ref 6–8.5)
RBC # BLD AUTO: 5.12 10*6/MM3 (ref 4.2–5.4)
SODIUM SERPL-SCNC: 144 MMOL/L (ref 136–145)
TRIGL SERPL-MCNC: 118 MG/DL (ref 0–150)
TSH SERPL DL<=0.005 MIU/L-ACNC: 3.74 MIU/ML (ref 0.27–4.2)
VLDLC SERPL CALC-MCNC: 23.6 MG/DL (ref 7–27)
WBC # BLD AUTO: 7.56 10*3/MM3 (ref 4.8–10.8)

## 2018-08-30 ENCOUNTER — OFFICE VISIT (OUTPATIENT)
Dept: FAMILY MEDICINE CLINIC | Facility: CLINIC | Age: 68
End: 2018-08-30

## 2018-08-30 VITALS
OXYGEN SATURATION: 98 % | TEMPERATURE: 98.4 F | SYSTOLIC BLOOD PRESSURE: 140 MMHG | DIASTOLIC BLOOD PRESSURE: 70 MMHG | HEIGHT: 65 IN | BODY MASS INDEX: 33.66 KG/M2 | WEIGHT: 202 LBS | RESPIRATION RATE: 16 BRPM | HEART RATE: 104 BPM

## 2018-08-30 DIAGNOSIS — Z12.31 SCREENING MAMMOGRAM, ENCOUNTER FOR: ICD-10-CM

## 2018-08-30 DIAGNOSIS — Z01.419 PAP SMEAR, AS PART OF ROUTINE GYNECOLOGICAL EXAMINATION: ICD-10-CM

## 2018-08-30 DIAGNOSIS — Z00.00 ENCOUNTER FOR ANNUAL PHYSICAL EXAM: Primary | ICD-10-CM

## 2018-08-30 DIAGNOSIS — E78.00 HYPERCHOLESTEROLEMIA: ICD-10-CM

## 2018-08-30 DIAGNOSIS — E03.9 ACQUIRED HYPOTHYROIDISM: ICD-10-CM

## 2018-08-30 DIAGNOSIS — K21.00 GASTROESOPHAGEAL REFLUX DISEASE WITH ESOPHAGITIS: ICD-10-CM

## 2018-08-30 DIAGNOSIS — E55.9 VITAMIN D DEFICIENCY: ICD-10-CM

## 2018-08-30 LAB
BILIRUB BLD-MCNC: NEGATIVE MG/DL
CLARITY, POC: CLEAR
COLOR UR: YELLOW
DEVELOPER EXPIRATION DATE: NORMAL
DEVELOPER LOT NUMBER: NORMAL
EXPIRATION DATE: NORMAL
FECAL OCCULT BLOOD SCREEN, POC: NEGATIVE
GLUCOSE UR STRIP-MCNC: NEGATIVE MG/DL
KETONES UR QL: NEGATIVE
LEUKOCYTE EST, POC: NEGATIVE
Lab: NORMAL
NEGATIVE CONTROL: NEGATIVE
NITRITE UR-MCNC: NEGATIVE MG/ML
PH UR: 7 [PH] (ref 5–8)
POSITIVE CONTROL: POSITIVE
PROT UR STRIP-MCNC: NEGATIVE MG/DL
RBC # UR STRIP: NEGATIVE /UL
SP GR UR: 1 (ref 1–1.03)
UROBILINOGEN UR QL: NORMAL

## 2018-08-30 PROCEDURE — G0328 FECAL BLOOD SCRN IMMUNOASSAY: HCPCS | Performed by: PHYSICIAN ASSISTANT

## 2018-08-30 PROCEDURE — 99397 PER PM REEVAL EST PAT 65+ YR: CPT | Performed by: PHYSICIAN ASSISTANT

## 2018-08-30 PROCEDURE — 81002 URINALYSIS NONAUTO W/O SCOPE: CPT | Performed by: PHYSICIAN ASSISTANT

## 2018-08-30 RX ORDER — SIMVASTATIN 40 MG
40 TABLET ORAL NIGHTLY
Qty: 90 TABLET | Refills: 3 | Status: SHIPPED | OUTPATIENT
Start: 2018-08-30 | End: 2019-08-23 | Stop reason: SDUPTHER

## 2018-08-30 RX ORDER — LEVOTHYROXINE SODIUM 0.12 MG/1
125 TABLET ORAL DAILY
Qty: 90 TABLET | Refills: 0 | Status: SHIPPED | OUTPATIENT
Start: 2018-08-30 | End: 2018-11-29 | Stop reason: SDUPTHER

## 2018-08-30 RX ORDER — ESOMEPRAZOLE MAGNESIUM 40 MG/1
40 CAPSULE, DELAYED RELEASE ORAL
Qty: 90 CAPSULE | Refills: 3 | Status: SHIPPED | OUTPATIENT
Start: 2018-08-30 | End: 2019-09-09 | Stop reason: SDUPTHER

## 2018-08-30 NOTE — PROGRESS NOTES
Subjective   Hilaria Klein is a 68 y.o. female.   Chief Complaint   Patient presents with   • Annual Exam   • Gynecologic Exam       History of Present Illness     Hilaria is a 68-year-old female who presents for annual physical examination and Pap smear.  She has gain 23 pounds since October 30, 2017. She had a colonoscopy with Dr. Shabazz I October 2017. Hilaria retired on April 1 ,2018.  She is doing well at office visit.   Bowel movements are daily without dark black tarry stools.  She has not been exercising.  Sleep has been normal. Diet has been so/so.  She is trying to eat healthier.  Denied any chest pain,shortness of air,wheezing,abdominal pain,dysuria/urine urgency/frequency,vaginal discharge,URI symptoms,breast pain/discharge/lumps or swelling of ankles.  Hilaria does breast exams at home.  Last eye exam was 2017.  She is due for mammogram.     The following portions of the patient's history were reviewed and updated as appropriate: allergies, current medications, past family history, past medical history, past social history and past surgical history.    Review of Systems   Constitutional: Negative.  Negative for chills, fatigue and fever.   HENT: Negative.    Eyes: Negative.    Respiratory: Negative.  Negative for cough, shortness of breath and wheezing.    Cardiovascular: Negative.  Negative for chest pain, palpitations and leg swelling.   Gastrointestinal: Negative.  Negative for abdominal pain, blood in stool, constipation, diarrhea, nausea and vomiting.   Endocrine: Negative.    Genitourinary: Negative.  Negative for decreased urine volume, dysuria, flank pain, frequency, hematuria, pelvic pain, urgency, vaginal bleeding and vaginal discharge.   Musculoskeletal: Negative.    Skin: Negative.    Allergic/Immunologic: Negative.    Neurological: Negative.    Hematological: Negative.    Psychiatric/Behavioral: Negative.  Negative for sleep disturbance and suicidal ideas.   All other systems reviewed  "and are negative.      Social History   Substance Use Topics   • Smoking status: Never Smoker   • Smokeless tobacco: Never Used   • Alcohol use No     Vitals:    08/30/18 1432   BP: 140/70   BP Location: Left arm   Patient Position: Sitting   Cuff Size: Adult   Pulse: 104   Resp: 16   Temp: 98.4 °F (36.9 °C)   TempSrc: Oral   SpO2: 98%   Weight: 91.6 kg (202 lb)   Height: 165.1 cm (65\")       Wt Readings from Last 3 Encounters:   08/30/18 91.6 kg (202 lb)   10/30/17 81.6 kg (179 lb 12.8 oz)   08/30/17 83.6 kg (184 lb 6.4 oz)       BP Readings from Last 3 Encounters:   08/30/18 140/70   10/30/17 151/81   08/30/17 136/76     Body mass index is 33.61 kg/m².  Allergies   Allergen Reactions   • Sulfa Antibiotics Swelling   • Penicillins Hives       Objective   Physical Exam   Constitutional: She is oriented to person, place, and time. Vital signs are normal. She appears well-developed and well-nourished.   Obese female   HENT:   Head: Normocephalic and atraumatic.   Right Ear: Hearing, tympanic membrane, external ear and ear canal normal.   Left Ear: Hearing, tympanic membrane, external ear and ear canal normal.   Nose: Nose normal. Right sinus exhibits no maxillary sinus tenderness and no frontal sinus tenderness. Left sinus exhibits no maxillary sinus tenderness and no frontal sinus tenderness.   Mouth/Throat: Uvula is midline, oropharynx is clear and moist and mucous membranes are normal.   Eyes: Pupils are equal, round, and reactive to light. Conjunctivae, EOM and lids are normal.   Neck: Trachea normal and phonation normal. Neck supple. Carotid bruit is not present. No edema present. No thyroid mass and no thyromegaly present.   Cardiovascular: Normal rate, regular rhythm, S1 normal, S2 normal, normal heart sounds and normal pulses.  PMI is not displaced.    No murmur heard.  Pulmonary/Chest: Effort normal and breath sounds normal. Chest wall is not dull to percussion. She exhibits no mass, no tenderness, no bony " tenderness, no laceration, no crepitus, no edema, no deformity, no swelling and no retraction. Right breast exhibits no inverted nipple, no mass, no nipple discharge, no skin change and no tenderness. Left breast exhibits no inverted nipple, no mass, no nipple discharge, no skin change and no tenderness. Breasts are symmetrical. There is no breast swelling.   Abdominal: Soft. Normal appearance, normal aorta and bowel sounds are normal. There is no hepatomegaly. There is no tenderness. Hernia confirmed negative in the right inguinal area and confirmed negative in the left inguinal area.   Genitourinary: Rectum normal, vagina normal and uterus normal. Rectal exam shows no external hemorrhoid, no internal hemorrhoid, no fissure, no tenderness, anal tone normal and guaiac negative stool. No breast tenderness, discharge or bleeding. Pelvic exam was performed with patient supine. No labial fusion. There is no rash, tenderness, lesion or injury on the right labia. There is no rash, tenderness, lesion or injury on the left labia. Cervix exhibits no motion tenderness, no discharge and no friability. Right adnexum displays no mass, no tenderness and no fullness. Left adnexum displays no mass, no tenderness and no fullness. No erythema, tenderness or bleeding in the vagina. No foreign body in the vagina. No signs of injury around the vagina. No vaginal discharge found.   Genitourinary Comments: Exam was chaperoned by Natali Shi LPN   Lymphadenopathy:     She has no cervical adenopathy.     She has no axillary adenopathy.        Right: No inguinal adenopathy present.        Left: No inguinal adenopathy present.   Neurological: She is alert and oriented to person, place, and time. She has normal reflexes.   Skin: Skin is warm, dry and intact. Capillary refill takes less than 2 seconds.   Psychiatric: She has a normal mood and affect. Her speech is normal and behavior is normal. Judgment and thought content normal. Cognition and  memory are normal.         Results for orders placed or performed in visit on 08/30/18   POC Occult Blood Stool   Result Value Ref Range    Fecal Occult Blood Negative Negative    Lot Number [id number]     Expiration Date 10,312,019     DEVELOPER LOT NUMBER [id number]     DEVELOPER EXPIRATION DATE 10,312,019     Positive Control Positive Positive    Negative Control Negative Negative   POC Urinalysis Dipstick   Result Value Ref Range    Color Yellow Yellow, Straw, Dark Yellow, Amanda    Clarity, UA Clear Clear    Glucose, UA Negative Negative, 1000 mg/dL (3+) mg/dL    Bilirubin Negative Negative    Ketones, UA Negative Negative    Specific Gravity  1.005 1.005 - 1.030    Blood, UA Negative Negative    pH, Urine 7.0 5.0 - 8.0    Protein, POC Negative Negative mg/dL    Urobilinogen, UA Normal Normal    Leukocytes Negative Negative    Nitrite, UA Negative Negative     Assessment/Plan   Hilaria was seen today for annual exam and gynecologic exam.    Diagnoses and all orders for this visit:    Encounter for annual physical exam  -     POC Occult Blood Stool  -     POC Urinalysis Dipstick    Pap smear, as part of routine gynecological examination  -     POC Occult Blood Stool  -     Pap IG, Rfx HPV All Pth    Vitamin D deficiency    Hypercholesterolemia  -     simvastatin (ZOCOR) 40 MG tablet; Take 1 tablet by mouth Every Night.    Acquired hypothyroidism  -     levothyroxine (SYNTHROID) 125 MCG tablet; Take 1 tablet by mouth Daily.  -     TSH; Future    Gastroesophageal reflux disease with esophagitis  -     esomeprazole (nexIUM) 40 MG capsule; Take 1 capsule by mouth Every Morning Before Breakfast.    Screening mammogram, encounter for  -     Mammo Screening Digital Tomosynthesis Bilateral With CAD; Future      1.  Annual physical examination with Pap smear: In office urinalysis and Hemoccult are negative at office visit today.  A Pap smear was collected and sent to the laboratory for further evaluation.  She will be notified  of test results when completed.  2.  Chronic hypothyroidism: I have reviewed her lab work with her at office visit today.  TSH was 3.74.  This to be between 1-2.   I will increase her levothyroxine to 125 µg daily.  She will stop the levothyroxine 112 µg now.  Plan to repeat TSH in 6-8 weeks.  3.  Need for screening mammogram: I have placed an order for screening mammogram for Solomon Smith.  She will schedule her appointment.  4.  Chronic and stable GERD: Doing well with the Nexium medication.  I have refilled this to pharmacy.  4.  Chronic and improving hypercholesterolemia: I have reviewed her lab work with her at office visit today.  Her cholesterol and triglycerides readings have improved.  I have refilled her generic Simvastatin to pharmacy.  Hilaria was instructed to increase physical activity and decrease carbohydrates and fatty food in diet.  Plan to repeat fasting blood work in 6 months.        Orders Only on 08/20/2018   Component Date Value Ref Range Status   • WBC 08/22/2018 7.56  4.80 - 10.80 10*3/mm3 Final   • RBC 08/22/2018 5.12  4.20 - 5.40 10*6/mm3 Final   • Hemoglobin 08/22/2018 14.6  12.0 - 16.0 g/dL Final   • Hematocrit 08/22/2018 46.0  37.0 - 47.0 % Final   • MCV 08/22/2018 89.8  81.0 - 99.0 fL Final   • MCH 08/22/2018 28.5  27.0 - 31.0 pg Final   • MCHC 08/22/2018 31.7  31.0 - 37.0 g/dL Final   • RDW 08/22/2018 12.8  11.5 - 14.5 % Final   • Platelets 08/22/2018 334  140 - 500 10*3/mm3 Final   • Neutrophil Rel % 08/22/2018 54.3  45.0 - 70.0 % Final   • Lymphocyte Rel % 08/22/2018 32.4  20.0 - 45.0 % Final   • Monocyte Rel % 08/22/2018 7.9  3.0 - 8.0 % Final   • Eosinophil Rel % 08/22/2018 4.0  0.0 - 4.0 % Final   • Basophil Rel % 08/22/2018 0.7  0.0 - 2.0 % Final   • Neutrophils Absolute 08/22/2018 4.11  1.50 - 8.30 10*3/mm3 Final   • Lymphocytes Absolute 08/22/2018 2.45  0.60 - 4.80 10*3/mm3 Final   • Monocytes Absolute 08/22/2018 0.60  0.00 - 1.00 10*3/mm3 Final   • Eosinophils Absolute  08/22/2018 0.30  0.10 - 0.30 10*3/mm3 Final   • Basophils Absolute 08/22/2018 0.05  0.00 - 0.20 10*3/mm3 Final   • Immature Granulocyte Rel % 08/22/2018 0.7* 0.0 - 0.5 % Final   • Immature Grans Absolute 08/22/2018 0.05* 0.00 - 0.03 10*3/mm3 Final   • nRBC 08/22/2018 0.0  0.0 - 0.0 /100 WBC Final   • Glucose 08/22/2018 95  65 - 99 mg/dL Final   • BUN 08/22/2018 11  8 - 23 mg/dL Final   • Creatinine 08/22/2018 0.64  0.57 - 1.00 mg/dL Final   • eGFR Non  Am 08/22/2018 93  >60 mL/min/1.73 Final   • eGFR African Am 08/22/2018 113  >60 mL/min/1.73 Final   • BUN/Creatinine Ratio 08/22/2018 17.2  7.0 - 25.0 Final   • Sodium 08/22/2018 144  136 - 145 mmol/L Final   • Potassium 08/22/2018 4.7  3.5 - 5.2 mmol/L Final   • Chloride 08/22/2018 103  98 - 107 mmol/L Final   • Total CO2 08/22/2018 28.0  22.0 - 29.0 mmol/L Final   • Calcium 08/22/2018 9.3  8.8 - 10.5 mg/dL Final   • Total Protein 08/22/2018 7.1  6.0 - 8.5 g/dL Final   • Albumin 08/22/2018 4.40  3.50 - 5.20 g/dL Final   • Globulin 08/22/2018 2.7  gm/dL Final   • A/G Ratio 08/22/2018 1.6  g/dL Final   • Total Bilirubin 08/22/2018 0.6  0.2 - 1.2 mg/dL Final   • Alkaline Phosphatase 08/22/2018 89  40 - 129 U/L Final   • AST (SGOT) 08/22/2018 24  5 - 32 U/L Final   • ALT (SGPT) 08/22/2018 25  5 - 33 U/L Final   • Total Cholesterol 08/22/2018 186  0 - 200 mg/dL Final   • Triglycerides 08/22/2018 118  0 - 150 mg/dL Final   • HDL Cholesterol 08/22/2018 63* 40 - 60 mg/dL Final   • VLDL Cholesterol 08/22/2018 23.6  7 - 27 mg/dL Final   • LDL Cholesterol  08/22/2018 99  0 - 100 mg/dL Final   • LDL/HDL Ratio 08/22/2018 1.58   Final   • TSH 08/22/2018 3.740  0.270 - 4.200 mIU/mL Final   • 25 Hydroxy, Vitamin D 08/22/2018 41.6  ng/ml Final    Comment: Reference Range for Total Vitamin D 25(OH)  Deficiency    <20.0 ng/mL  Insufficiency 21-29 ng/mL  Sufficiency   30-74 ng/mL

## 2018-09-13 ENCOUNTER — HOSPITAL ENCOUNTER (OUTPATIENT)
Dept: MAMMOGRAPHY | Facility: HOSPITAL | Age: 68
Discharge: HOME | End: 2018-09-13
Admitting: PHYSICIAN ASSISTANT

## 2018-09-13 DIAGNOSIS — Z12.31 SCREENING MAMMOGRAM, ENCOUNTER FOR: ICD-10-CM

## 2018-09-13 PROCEDURE — 77063 BREAST TOMOSYNTHESIS BI: CPT

## 2018-09-13 PROCEDURE — 77067 SCR MAMMO BI INCL CAD: CPT

## 2018-10-17 ENCOUNTER — TELEPHONE (OUTPATIENT)
Dept: FAMILY MEDICINE CLINIC | Facility: CLINIC | Age: 68
End: 2018-10-17

## 2018-10-18 DIAGNOSIS — IMO0002 PROPHYLACTIC ANTIBIOTIC FOR DENTAL PROCEDURE INDICATED DUE TO PRIOR JOINT REPLACEMENT: ICD-10-CM

## 2018-10-18 DIAGNOSIS — IMO0002 PROPHYLACTIC ANTIBIOTIC FOR DENTAL PROCEDURE INDICATED DUE TO PRIOR JOINT REPLACEMENT: Primary | ICD-10-CM

## 2018-10-18 RX ORDER — AZITHROMYCIN 500 MG/1
TABLET, FILM COATED ORAL
Qty: 1 TABLET | Refills: 0 | Status: SHIPPED | OUTPATIENT
Start: 2018-10-18 | End: 2018-10-18 | Stop reason: SDUPTHER

## 2018-10-18 RX ORDER — AZITHROMYCIN 500 MG/1
TABLET, FILM COATED ORAL
Qty: 1 TABLET | Refills: 0 | Status: SHIPPED | OUTPATIENT
Start: 2018-10-18 | End: 2019-09-19

## 2018-10-18 NOTE — TELEPHONE ENCOUNTER
Notify patient that I have sent azithromycin 500 mg to take 1 tablet 30-60 minutes before dental procedure to her pharmacy.

## 2018-10-30 ENCOUNTER — RESULTS ENCOUNTER (OUTPATIENT)
Dept: FAMILY MEDICINE CLINIC | Facility: CLINIC | Age: 68
End: 2018-10-30

## 2018-10-30 DIAGNOSIS — E03.9 ACQUIRED HYPOTHYROIDISM: ICD-10-CM

## 2018-10-30 LAB — TSH SERPL DL<=0.005 MIU/L-ACNC: 0.98 MIU/ML (ref 0.27–4.2)

## 2018-11-29 DIAGNOSIS — E03.9 ACQUIRED HYPOTHYROIDISM: ICD-10-CM

## 2018-11-29 RX ORDER — LEVOTHYROXINE SODIUM 0.12 MG/1
125 TABLET ORAL DAILY
Qty: 90 TABLET | Refills: 0 | Status: SHIPPED | OUTPATIENT
Start: 2018-11-29 | End: 2019-02-27 | Stop reason: SDUPTHER

## 2019-02-27 DIAGNOSIS — E03.9 ACQUIRED HYPOTHYROIDISM: ICD-10-CM

## 2019-02-27 RX ORDER — LEVOTHYROXINE SODIUM 0.12 MG/1
125 TABLET ORAL DAILY
Qty: 90 TABLET | Refills: 3 | Status: SHIPPED | OUTPATIENT
Start: 2019-02-27 | End: 2019-09-19 | Stop reason: DRUGHIGH

## 2019-08-23 DIAGNOSIS — E78.00 HYPERCHOLESTEROLEMIA: ICD-10-CM

## 2019-08-23 RX ORDER — SIMVASTATIN 40 MG
40 TABLET ORAL NIGHTLY
Qty: 90 TABLET | Refills: 0 | Status: SHIPPED | OUTPATIENT
Start: 2019-08-23 | End: 2019-11-23 | Stop reason: SDUPTHER

## 2019-09-09 DIAGNOSIS — K21.00 GASTROESOPHAGEAL REFLUX DISEASE WITH ESOPHAGITIS: ICD-10-CM

## 2019-09-09 RX ORDER — ESOMEPRAZOLE MAGNESIUM 40 MG/1
CAPSULE, DELAYED RELEASE ORAL
Qty: 90 CAPSULE | Refills: 0 | Status: SHIPPED | OUTPATIENT
Start: 2019-09-09 | End: 2019-12-09 | Stop reason: SDUPTHER

## 2019-09-10 ENCOUNTER — TRANSCRIBE ORDERS (OUTPATIENT)
Dept: ADMINISTRATIVE | Facility: HOSPITAL | Age: 69
End: 2019-09-10

## 2019-09-10 DIAGNOSIS — Z12.39 SCREENING BREAST EXAMINATION: Primary | ICD-10-CM

## 2019-09-11 DIAGNOSIS — E03.9 ACQUIRED HYPOTHYROIDISM: ICD-10-CM

## 2019-09-11 DIAGNOSIS — E78.00 HYPERCHOLESTEROLEMIA: ICD-10-CM

## 2019-09-11 DIAGNOSIS — Z00.00 ENCOUNTER FOR ANNUAL PHYSICAL EXAM: Primary | ICD-10-CM

## 2019-09-12 LAB
ALBUMIN SERPL-MCNC: 4.4 G/DL (ref 3.5–5.2)
ALBUMIN/GLOB SERPL: 1.6 G/DL
ALP SERPL-CCNC: 86 U/L (ref 39–117)
ALT SERPL-CCNC: 38 U/L (ref 1–33)
AST SERPL-CCNC: 29 U/L (ref 1–32)
BASOPHILS # BLD AUTO: 0.07 10*3/MM3 (ref 0–0.2)
BASOPHILS NFR BLD AUTO: 0.6 % (ref 0–1.5)
BILIRUB SERPL-MCNC: 0.5 MG/DL (ref 0.2–1.2)
BUN SERPL-MCNC: 12 MG/DL (ref 8–23)
BUN/CREAT SERPL: 14.5 (ref 7–25)
CALCIUM SERPL-MCNC: 9.2 MG/DL (ref 8.6–10.5)
CHLORIDE SERPL-SCNC: 98 MMOL/L (ref 98–107)
CHOLEST SERPL-MCNC: 168 MG/DL (ref 0–200)
CHOLEST/HDLC SERPL: 3.43 {RATIO}
CO2 SERPL-SCNC: 27.3 MMOL/L (ref 22–29)
CREAT SERPL-MCNC: 0.83 MG/DL (ref 0.57–1)
EOSINOPHIL # BLD AUTO: 0.29 10*3/MM3 (ref 0–0.4)
EOSINOPHIL NFR BLD AUTO: 2.7 % (ref 0.3–6.2)
ERYTHROCYTE [DISTWIDTH] IN BLOOD BY AUTOMATED COUNT: 13.8 % (ref 12.3–15.4)
GLOBULIN SER CALC-MCNC: 2.7 GM/DL
GLUCOSE SERPL-MCNC: 102 MG/DL (ref 65–99)
HCT VFR BLD AUTO: 47.4 % (ref 34–46.6)
HDLC SERPL-MCNC: 49 MG/DL (ref 40–60)
HGB BLD-MCNC: 14.6 G/DL (ref 12–15.9)
IMM GRANULOCYTES # BLD AUTO: 0.08 10*3/MM3 (ref 0–0.05)
IMM GRANULOCYTES NFR BLD AUTO: 0.7 % (ref 0–0.5)
LDLC SERPL CALC-MCNC: 90 MG/DL (ref 0–100)
LYMPHOCYTES # BLD AUTO: 3.35 10*3/MM3 (ref 0.7–3.1)
LYMPHOCYTES NFR BLD AUTO: 30.6 % (ref 19.6–45.3)
MCH RBC QN AUTO: 28 PG (ref 26.6–33)
MCHC RBC AUTO-ENTMCNC: 30.8 G/DL (ref 31.5–35.7)
MCV RBC AUTO: 91 FL (ref 79–97)
MONOCYTES # BLD AUTO: 0.92 10*3/MM3 (ref 0.1–0.9)
MONOCYTES NFR BLD AUTO: 8.4 % (ref 5–12)
NEUTROPHILS # BLD AUTO: 6.22 10*3/MM3 (ref 1.7–7)
NEUTROPHILS NFR BLD AUTO: 57 % (ref 42.7–76)
NRBC BLD AUTO-RTO: 0 /100 WBC (ref 0–0.2)
PLATELET # BLD AUTO: 342 10*3/MM3 (ref 140–450)
POTASSIUM SERPL-SCNC: 4.6 MMOL/L (ref 3.5–5.2)
PROT SERPL-MCNC: 7.1 G/DL (ref 6–8.5)
RBC # BLD AUTO: 5.21 10*6/MM3 (ref 3.77–5.28)
SODIUM SERPL-SCNC: 140 MMOL/L (ref 136–145)
TRIGL SERPL-MCNC: 144 MG/DL (ref 0–150)
TSH SERPL DL<=0.005 MIU/L-ACNC: 3.17 UIU/ML (ref 0.27–4.2)
VLDLC SERPL CALC-MCNC: 28.8 MG/DL
WBC # BLD AUTO: 10.93 10*3/MM3 (ref 3.4–10.8)

## 2019-09-18 ENCOUNTER — APPOINTMENT (OUTPATIENT)
Dept: MAMMOGRAPHY | Facility: HOSPITAL | Age: 69
End: 2019-09-18

## 2019-09-19 ENCOUNTER — OFFICE VISIT (OUTPATIENT)
Dept: FAMILY MEDICINE CLINIC | Facility: CLINIC | Age: 69
End: 2019-09-19

## 2019-09-19 VITALS
WEIGHT: 218 LBS | DIASTOLIC BLOOD PRESSURE: 100 MMHG | BODY MASS INDEX: 36.32 KG/M2 | RESPIRATION RATE: 16 BRPM | OXYGEN SATURATION: 96 % | TEMPERATURE: 97.9 F | HEIGHT: 65 IN | SYSTOLIC BLOOD PRESSURE: 166 MMHG | HEART RATE: 83 BPM

## 2019-09-19 DIAGNOSIS — Z23 NEED FOR IMMUNIZATION AGAINST INFLUENZA: ICD-10-CM

## 2019-09-19 DIAGNOSIS — Z78.0 POST-MENOPAUSAL: ICD-10-CM

## 2019-09-19 DIAGNOSIS — E03.9 ACQUIRED HYPOTHYROIDISM: ICD-10-CM

## 2019-09-19 DIAGNOSIS — Z00.00 MEDICARE ANNUAL WELLNESS VISIT, SUBSEQUENT: Primary | ICD-10-CM

## 2019-09-19 DIAGNOSIS — E78.00 HYPERCHOLESTEROLEMIA: ICD-10-CM

## 2019-09-19 DIAGNOSIS — I10 ACCELERATED HYPERTENSION: ICD-10-CM

## 2019-09-19 PROCEDURE — G0008 ADMIN INFLUENZA VIRUS VAC: HCPCS | Performed by: PHYSICIAN ASSISTANT

## 2019-09-19 PROCEDURE — 99212 OFFICE O/P EST SF 10 MIN: CPT | Performed by: PHYSICIAN ASSISTANT

## 2019-09-19 PROCEDURE — G0439 PPPS, SUBSEQ VISIT: HCPCS | Performed by: PHYSICIAN ASSISTANT

## 2019-09-19 PROCEDURE — 93000 ELECTROCARDIOGRAM COMPLETE: CPT | Performed by: PHYSICIAN ASSISTANT

## 2019-09-19 PROCEDURE — 90653 IIV ADJUVANT VACCINE IM: CPT | Performed by: PHYSICIAN ASSISTANT

## 2019-09-19 RX ORDER — LISINOPRIL 10 MG/1
10 TABLET ORAL DAILY
Qty: 30 TABLET | Refills: 3 | Status: SHIPPED | OUTPATIENT
Start: 2019-09-19 | End: 2019-09-26 | Stop reason: DRUGHIGH

## 2019-09-19 RX ORDER — LEVOTHYROXINE SODIUM 137 UG/1
137 TABLET ORAL DAILY
Qty: 30 TABLET | Refills: 3 | Status: SHIPPED | OUTPATIENT
Start: 2019-09-19 | End: 2019-11-15 | Stop reason: DRUGHIGH

## 2019-09-19 NOTE — PROGRESS NOTES
The ABCs of the Annual Wellness Visit  Subsequent Medicare Wellness Visit    Chief Complaint   Patient presents with   • Medicare Wellness-subsequent       Subjective   History of Present Illness:  Hilaria Klein is a 69 y.o. female who presents for a Subsequent Medicare Wellness Visit.  Hilaria states she is feeling well at office visit today.  She has not been checking her blood pressure at home.  States there is a strong family history of hypertension.  Her diet has been poor.  She is been eating more carbohydrates and fatty food recently.  Has mammogram scheduled soon.  Last DEXA scan was in 2017.  Bowel movements have been daily without dark black tarry stools.  Colonoscopy is current.  She has been exercising by riding her exercise bike for approximately 1 hour 3 times a week.  She is trying to increase this to 4 times weekly.  Caffeine intake is 16 ounces of coffee a day.  She tries to drink more water.  Has been compliant with her medications.  Denied any upper respiratory symptoms, chest pain, shortness of air, dizziness, headaches, slurred speech, facial drooping, gait disturbance, arm weakness, swelling of ankles or GI upset.    HEALTH RISK ASSESSMENT    Recent Hospitalizations:  No hospitalization(s) within the last year.    Current Medical Providers:  Patient Care Team:  Tennille Lebron PA-C as PCP - General    Smoking Status:  Social History     Tobacco Use   Smoking Status Never Smoker   Smokeless Tobacco Never Used       Alcohol Consumption:  Social History     Substance and Sexual Activity   Alcohol Use No       Depression Screen:   No flowsheet data found.    Fall Risk Screen:  STEADI Fall Risk Assessment was completed, and patient is at LOW risk for falls.Assessment completed on:9/19/2019    Health Habits and Functional and Cognitive Screening:  No flowsheet data found.      Does the patient have evidence of cognitive impairment? No    Asprin use counseling:Does not need ASA (and currently is  not on it)    Age-appropriate Screening Schedule:  Refer to the list below for future screening recommendations based on patient's age, sex and/or medical conditions. Orders for these recommended tests are listed in the plan section. The patient has been provided with a written plan.    Health Maintenance   Topic Date Due   • TDAP/TD VACCINES (1 - Tdap) 03/29/1971   • ZOSTER VACCINE (1 of 2) 03/29/2002   • PNEUMOCOCCAL VACCINES (65+ LOW/MEDIUM RISK) (1 of 2 - PCV13) 03/29/2017   • INFLUENZA VACCINE  08/01/2019   • LIPID PANEL  09/11/2020   • MAMMOGRAM  09/13/2020   • COLONOSCOPY  10/30/2027          The following portions of the patient's history were reviewed and updated as appropriate: allergies, current medications, past family history, past medical history, past social history, past surgical history and problem list.    Outpatient Medications Prior to Visit   Medication Sig Dispense Refill   • Cholecalciferol (VITAMIN D3) 5000 units capsule capsule Take 5,000 Units by mouth Daily.     • esomeprazole (nexIUM) 40 MG capsule TAKE 1 CAPSULE BY MOUTH EVERY MORNING BEFORE BREKFAST. 90 capsule 0   • simvastatin (ZOCOR) 40 MG tablet TAKE 1 TABLET BY MOUTH EVERY NIGHT 90 tablet 0   • levothyroxine (SYNTHROID, LEVOTHROID) 125 MCG tablet TAKE 1 TABLET BY MOUTH DAILY 90 tablet 3   • azithromycin (ZITHROMAX) 500 MG tablet 1 tablet 30-60 minutes before dental procedure 1 tablet 0     No facility-administered medications prior to visit.        Patient Active Problem List   Diagnosis   • Gastroesophageal reflux disease with esophagitis   • Hypercholesterolemia   • Acquired hypothyroidism   • Pruritus of vagina   • Vitamin D deficiency   • Medicare annual wellness visit, subsequent   • Pap smear, as part of routine gynecological examination   • Neoplasm of uncertain behavior   • Irregular heart beat   • Dizziness   • Screening mammogram, encounter for   • Encounter for screening colonoscopy   • Post-menopausal   • Encounter for  "screening for malignant neoplasm of colon   • Hyperplastic polyp of sigmoid colon   • Need for immunization against influenza   • Accelerated hypertension       Advanced Care Planning:  Patient does not have an advance directive - not interested in additional information    Review of Systems   Constitutional: Negative.    HENT: Negative.    Eyes: Negative.  Negative for pain and visual disturbance.   Respiratory: Negative.  Negative for cough, chest tightness, shortness of breath and stridor.    Cardiovascular: Negative.  Negative for chest pain, palpitations and leg swelling.   Gastrointestinal: Negative.  Negative for abdominal pain, blood in stool, constipation, diarrhea, nausea and rectal pain.   Endocrine: Negative.    Genitourinary: Negative.    Musculoskeletal: Negative.    Skin: Negative.    Allergic/Immunologic: Negative.    Neurological: Negative.  Negative for dizziness, tremors, seizures, syncope, facial asymmetry, speech difficulty, weakness, light-headedness, numbness and headaches.   Hematological: Negative.    Psychiatric/Behavioral: Negative.  Negative for sleep disturbance.   All other systems reviewed and are negative.      Compared to one year ago, the patient feels her physical health is better.  Compared to one year ago, the patient feels her mental health is the same.    Reviewed chart for potential of high risk medication in the elderly: yes  Reviewed chart for potential of harmful drug interactions in the elderly:yes    Objective         Vitals:    09/19/19 0934 09/19/19 1004 09/19/19 1005   BP: (!) 190/118 (!) 170/108 166/100   BP Location:  Left arm Right arm   Patient Position:  Sitting Sitting   Cuff Size:  Large Adult Large Adult   Pulse: 83     Resp: 16     Temp: 97.9 °F (36.6 °C)     SpO2: 96%     Weight: 98.9 kg (218 lb)     Height: 165.1 cm (65\")         Body mass index is 36.28 kg/m².  Discussed the patient's BMI with her. The BMI is above average; BMI management plan is " completed.    Physical Exam   Constitutional: She is oriented to person, place, and time. Vital signs are normal. She appears well-developed and well-nourished.   Obese female   Eyes: Conjunctivae, EOM and lids are normal. Pupils are equal, round, and reactive to light.   Neck: Trachea normal and phonation normal. Neck supple. No tracheal tenderness present. Carotid bruit is not present. No tracheal deviation and no edema present. No thyroid mass and no thyromegaly present.   Cardiovascular: Normal rate, regular rhythm, S1 normal, S2 normal, normal heart sounds and normal pulses.   No murmur heard.  Pulmonary/Chest: Effort normal and breath sounds normal.   Abdominal: Soft. Normal appearance, normal aorta and bowel sounds are normal. There is no hepatomegaly. There is no tenderness.   Neurological: She is alert and oriented to person, place, and time.   Skin: Skin is warm, dry and intact. Capillary refill takes less than 2 seconds.   Psychiatric: She has a normal mood and affect. Her speech is normal and behavior is normal. Judgment and thought content normal. Cognition and memory are normal.       Lab Results   Component Value Date     (H) 09/11/2019    CHLPL 168 09/11/2019    TRIG 144 09/11/2019    HDL 49 09/11/2019    LDL 90 09/11/2019    VLDL 28.8 09/11/2019        ECG 12 Lead  Date/Time: 9/19/2019 10:11 AM  Performed by: Tennille Lebron PA-C  Authorized by: Tennille Lebron PA-C   Comparison: not compared with previous ECG   Previous ECG: no previous ECG available  Rhythm: sinus rhythm  Rate: normal  BPM: 80  Conduction: conduction normal  ST Segments: ST segments normal  T Waves: T waves normal  QRS axis: normal    Clinical impression: normal ECG  Comments: Indication: New onset hypertension  MO:  174 ms  QRS:  89 ms  QT/QTc:  366/402 ms              Assessment/Plan   Medicare Risks and Personalized Health Plan  CMS Preventative Services Quick Reference  Breast Cancer/Mammogram  Screening  Immunizations Discussed/Encouraged (specific immunizations; Influenza, Pneumococcal 23 and Shingrix )  Obesity/Overweight     The above risks/problems have been discussed with the patient.  Pertinent information has been shared with the patient in the After Visit Summary.  Follow up plans and orders are seen below in the Assessment/Plan Section.    Diagnoses and all orders for this visit:    1. Medicare annual wellness visit, subsequent (Primary)    2. Hypercholesterolemia    3. Acquired hypothyroidism  -     levothyroxine (SYNTHROID, LEVOTHROID) 137 MCG tablet; Take 1 tablet by mouth Daily.  Dispense: 30 tablet; Refill: 3  -     TSH; Future    4. Accelerated hypertension  -     ECG 12 Lead  -     lisinopril (PRINIVIL,ZESTRIL) 10 MG tablet; Take 1 tablet by mouth Daily.  Dispense: 30 tablet; Refill: 3    5. Need for immunization against influenza  -     Fluad Quad >65 years ([id number])    6. Post-menopausal  -     DEXA Bone Density Axial; Future    1.  Annual sequential Medicare wellness examination with hypercholesterolemia: I have reviewed her lab work that was collected on September 11, 2019 with her at office visit today.  Cholesterol 168, triglycerides 144, HDL 49 and LDL was 90.  Fasting blood sugar was slightly elevated at 102.  I have asked Hilaria to change eating habits by decreasing carbohydrates and fatty food in diet.  We will also increase physical activity.  2.  New accelerated hypertension: She has not had hypertensive issues in past.  There is a strong family history of high blood pressure.  I have rechecked blood pressure at office visit today and got 170/108 in left arm and 166/100 and right arm.  In office EKG shows normal sinus rhythm.  Will start lisinopril 10 mg daily.  She will return to office in 1 week for blood pressure check.  I have asked her to schedule follow-up appointment in 1 month for reevaluation as well.  I have asked Hilaria to decrease her salt and sodium intake as  well.  Currently asymptomatic.  3.  Chronic and uncontrolled hypothyroidism: Her TSH was 3.17 with the above blood work.  Currently asymptomatic therefore I will increase her thyroid medication to levothyroxine 137 mcg daily.  Plan to return to office in 6-8 weeks for repeat TSH.  4.  Need for updated high-dose flu shot: Hilaria has given written consent to receive updated flu immunization at office visit today.  States she thinks she had shingles and pneumonia shot at the [facility] pharmacy.  We will try to obtain prior immunizations for my review.  5.  Chronic postmenopausal female: I have placed orders for D[facility] scan at [facility].  She has an upcoming appointment for mammogram as well.  Needs to hold over-the-counter calcium for 3 days prior to DEXA scan.  Hilaria will be notified of test results when completed.    Patient Counseling:  --Nutrition: Stressed importance of moderation in sodium/caffeine intake, saturated fat and cholesterol.  Discussed caloric balance, sufficient intake of fresh fruits, vegetables, fiber, calcium, iron.  --Discussed the new recommendation against daily use of baby aspirin for primary prevention in low risk patients.  --Exercise: Stressed the importance of regular exercise.   --Substance Abuse: Discussed cessation/primary prevention of tobacco, alcohol, or other drug use; driving or other dangerous activities under the influence.    --Dental health: Discussed importance of regular tooth brushing, flossing, and    dental visits.  -- suggested having eyes and vision checked if needed or past due.  --Immunizations reviewed.        Orders Only on 09/11/2019   Component Date Value Ref Range Status   • TSH 09/11/2019 3.170  0.270 - 4.200 uIU/mL Final   • Total Cholesterol 09/11/2019 168  0 - 200 mg/dL Final   • Triglycerides 09/11/2019 144  0 - 150 mg/dL Final   • HDL Cholesterol 09/11/2019 49  40 - 60 mg/dL Final   • VLDL Cholesterol 09/11/2019 28.8  mg/dL Final   • LDL  Cholesterol  09/11/2019 90  0 - 100 mg/dL Final   • Chol/HDL Ratio 09/11/2019 3.43   Final   • Glucose 09/11/2019 102* 65 - 99 mg/dL Final   • BUN 09/11/2019 12  8 - 23 mg/dL Final   • Creatinine 09/11/2019 0.83  0.57 - 1.00 mg/dL Final   • eGFR Non  Am 09/11/2019 69  >60 mL/min/1.73 Final   • eGFR African Am 09/11/2019 83  >60 mL/min/1.73 Final   • BUN/Creatinine Ratio 09/11/2019 14.5  7.0 - 25.0 Final   • Sodium 09/11/2019 140  136 - 145 mmol/L Final   • Potassium 09/11/2019 4.6  3.5 - 5.2 mmol/L Final   • Chloride 09/11/2019 98  98 - 107 mmol/L Final   • Total CO2 09/11/2019 27.3  22.0 - 29.0 mmol/L Final   • Calcium 09/11/2019 9.2  8.6 - 10.5 mg/dL Final   • Total Protein 09/11/2019 7.1  6.0 - 8.5 g/dL Final   • Albumin 09/11/2019 4.40  3.50 - 5.20 g/dL Final   • Globulin 09/11/2019 2.7  gm/dL Final   • A/G Ratio 09/11/2019 1.6  g/dL Final   • Total Bilirubin 09/11/2019 0.5  0.2 - 1.2 mg/dL Final   • Alkaline Phosphatase 09/11/2019 86  39 - 117 U/L Final   • AST (SGOT) 09/11/2019 29  1 - 32 U/L Final   • ALT (SGPT) 09/11/2019 38* 1 - 33 U/L Final   • WBC 09/11/2019 10.93* 3.40 - 10.80 10*3/mm3 Final   • RBC 09/11/2019 5.21  3.77 - 5.28 10*6/mm3 Final   • Hemoglobin 09/11/2019 14.6  12.0 - 15.9 g/dL Final   • Hematocrit 09/11/2019 47.4* 34.0 - 46.6 % Final   • MCV 09/11/2019 91.0  79.0 - 97.0 fL Final   • MCH 09/11/2019 28.0  26.6 - 33.0 pg Final   • MCHC 09/11/2019 30.8* 31.5 - 35.7 g/dL Final   • RDW 09/11/2019 13.8  12.3 - 15.4 % Final   • Platelets 09/11/2019 342  140 - 450 10*3/mm3 Final   • Neutrophil Rel % 09/11/2019 57.0  42.7 - 76.0 % Final   • Lymphocyte Rel % 09/11/2019 30.6  19.6 - 45.3 % Final   • Monocyte Rel % 09/11/2019 8.4  5.0 - 12.0 % Final   • Eosinophil Rel % 09/11/2019 2.7  0.3 - 6.2 % Final   • Basophil Rel % 09/11/2019 0.6  0.0 - 1.5 % Final   • Neutrophils Absolute 09/11/2019 6.22  1.70 - 7.00 10*3/mm3 Final   • Lymphocytes Absolute 09/11/2019 3.35* 0.70 - 3.10 10*3/mm3 Final   •  Monocytes Absolute 09/11/2019 0.92* 0.10 - 0.90 10*3/mm3 Final   • Eosinophils Absolute 09/11/2019 0.29  0.00 - 0.40 10*3/mm3 Final   • Basophils Absolute 09/11/2019 0.07  0.00 - 0.20 10*3/mm3 Final   • Immature Granulocyte Rel % 09/11/2019 0.7* 0.0 - 0.5 % Final   • Immature Grans Absolute 09/11/2019 0.08* 0.00 - 0.05 10*3/mm3 Final   • nRBC 09/11/2019 0.0  0.0 - 0.2 /100 WBC Final       Follow Up:  Return in about 4 weeks (around 10/17/2019).     An After Visit Summary and PPPS were given to the patient.

## 2019-09-19 NOTE — PROGRESS NOTES
"Subjective   Hilaria Klein is a 69 y.o. female presents for   Chief Complaint   Patient presents with   • Medicare Wellness-subsequent       History of Present Illness     The following portions of the patient's history were reviewed and updated as appropriate: allergies, current medications, past family history, past medical history, past social history, past surgical history and problem list.    Review of Systems   All other systems reviewed and are negative.        Vitals:    09/19/19 0934   BP: (!) 190/118   Pulse: 103   Resp: 16   Temp: 97.9 °F (36.6 °C)   SpO2: 96%   Weight: 98.9 kg (218 lb)   Height: 165.1 cm (65\")     Wt Readings from Last 3 Encounters:   09/19/19 98.9 kg (218 lb)   08/30/18 91.6 kg (202 lb)   10/30/17 81.6 kg (179 lb 12.8 oz)     BP Readings from Last 3 Encounters:   09/19/19 (!) 190/118   08/30/18 140/70   10/30/17 151/81     Social History     Socioeconomic History   • Marital status: Single     Spouse name: Not on file   • Number of children: Not on file   • Years of education: Not on file   • Highest education level: Not on file   Tobacco Use   • Smoking status: Never Smoker   • Smokeless tobacco: Never Used   Substance and Sexual Activity   • Alcohol use: No   • Drug use: No   • Sexual activity: Defer       Allergies   Allergen Reactions   • Sulfa Antibiotics Swelling   • Penicillins Hives       Body mass index is 36.28 kg/m².    Objective   Physical Exam    Assessment/Plan   Hilaria was seen today for medicare wellness-subsequent.    Diagnoses and all orders for this visit:    Medicare annual wellness visit, subsequent    Hypercholesterolemia    Acquired hypothyroidism          NICKIE [facility]  [address] [address]  Dept: [phone]  Dept Fax: [phone]  Loc: [phone]  Loc Fax: [phone]           Orders Only on 09/11/2019   Component Date Value Ref Range Status   • " TSH 09/11/2019 3.170  0.270 - 4.200 uIU/mL Final   • Total Cholesterol 09/11/2019 168  0 - 200 mg/dL Final   • Triglycerides 09/11/2019 144  0 - 150 mg/dL Final   • HDL Cholesterol 09/11/2019 49  40 - 60 mg/dL Final   • VLDL Cholesterol 09/11/2019 28.8  mg/dL Final   • LDL Cholesterol  09/11/2019 90  0 - 100 mg/dL Final   • Chol/HDL Ratio 09/11/2019 3.43   Final   • Glucose 09/11/2019 102* 65 - 99 mg/dL Final   • BUN 09/11/2019 12  8 - 23 mg/dL Final   • Creatinine 09/11/2019 0.83  0.57 - 1.00 mg/dL Final   • eGFR Non  Am 09/11/2019 69  >60 mL/min/1.73 Final   • eGFR African Am 09/11/2019 83  >60 mL/min/1.73 Final   • BUN/Creatinine Ratio 09/11/2019 14.5  7.0 - 25.0 Final   • Sodium 09/11/2019 140  136 - 145 mmol/L Final   • Potassium 09/11/2019 4.6  3.5 - 5.2 mmol/L Final   • Chloride 09/11/2019 98  98 - 107 mmol/L Final   • Total CO2 09/11/2019 27.3  22.0 - 29.0 mmol/L Final   • Calcium 09/11/2019 9.2  8.6 - 10.5 mg/dL Final   • Total Protein 09/11/2019 7.1  6.0 - 8.5 g/dL Final   • Albumin 09/11/2019 4.40  3.50 - 5.20 g/dL Final   • Globulin 09/11/2019 2.7  gm/dL Final   • A/G Ratio 09/11/2019 1.6  g/dL Final   • Total Bilirubin 09/11/2019 0.5  0.2 - 1.2 mg/dL Final   • Alkaline Phosphatase 09/11/2019 86  39 - 117 U/L Final   • AST (SGOT) 09/11/2019 29  1 - 32 U/L Final   • ALT (SGPT) 09/11/2019 38* 1 - 33 U/L Final   • WBC 09/11/2019 10.93* 3.40 - 10.80 10*3/mm3 Final   • RBC 09/11/2019 5.21  3.77 - 5.28 10*6/mm3 Final   • Hemoglobin 09/11/2019 14.6  12.0 - 15.9 g/dL Final   • Hematocrit 09/11/2019 47.4* 34.0 - 46.6 % Final   • MCV 09/11/2019 91.0  79.0 - 97.0 fL Final   • MCH 09/11/2019 28.0  26.6 - 33.0 pg Final   • MCHC 09/11/2019 30.8* 31.5 - 35.7 g/dL Final   • RDW 09/11/2019 13.8  12.3 - 15.4 % Final   • Platelets 09/11/2019 342  140 - 450 10*3/mm3 Final   • Neutrophil Rel % 09/11/2019 57.0  42.7 - 76.0 % Final   • Lymphocyte Rel % 09/11/2019 30.6  19.6 - 45.3 % Final   • Monocyte Rel % 09/11/2019  8.4  5.0 - 12.0 % Final   • Eosinophil Rel % 09/11/2019 2.7  0.3 - 6.2 % Final   • Basophil Rel % 09/11/2019 0.6  0.0 - 1.5 % Final   • Neutrophils Absolute 09/11/2019 6.22  1.70 - 7.00 10*3/mm3 Final   • Lymphocytes Absolute 09/11/2019 3.35* 0.70 - 3.10 10*3/mm3 Final   • Monocytes Absolute 09/11/2019 0.92* 0.10 - 0.90 10*3/mm3 Final   • Eosinophils Absolute 09/11/2019 0.29  0.00 - 0.40 10*3/mm3 Final   • Basophils Absolute 09/11/2019 0.07  0.00 - 0.20 10*3/mm3 Final   • Immature Granulocyte Rel % 09/11/2019 0.7* 0.0 - 0.5 % Final   • Immature Grans Absolute 09/11/2019 0.08* 0.00 - 0.05 10*3/mm3 Final   • nRBC 09/11/2019 0.0  0.0 - 0.2 /100 WBC Final

## 2019-09-20 ENCOUNTER — HOSPITAL ENCOUNTER (OUTPATIENT)
Dept: MAMMOGRAPHY | Facility: HOSPITAL | Age: 69
Discharge: HOME | End: 2019-09-20
Admitting: PHYSICIAN ASSISTANT

## 2019-09-20 DIAGNOSIS — Z12.39 SCREENING BREAST EXAMINATION: ICD-10-CM

## 2019-09-20 PROCEDURE — 77067 SCR MAMMO BI INCL CAD: CPT

## 2019-09-20 PROCEDURE — 77063 BREAST TOMOSYNTHESIS BI: CPT

## 2019-09-24 ENCOUNTER — APPOINTMENT (OUTPATIENT)
Dept: BONE DENSITY | Facility: HOSPITAL | Age: 69
End: 2019-09-24

## 2019-09-24 DIAGNOSIS — Z78.0 POST-MENOPAUSAL: ICD-10-CM

## 2019-09-24 PROCEDURE — 77080 DXA BONE DENSITY AXIAL: CPT

## 2019-09-25 PROBLEM — M85.852 OSTEOPENIA OF NECK OF LEFT FEMUR: Status: ACTIVE | Noted: 2019-09-25

## 2019-09-26 ENCOUNTER — CLINICAL SUPPORT (OUTPATIENT)
Dept: FAMILY MEDICINE CLINIC | Facility: CLINIC | Age: 69
End: 2019-09-26

## 2019-09-26 ENCOUNTER — TELEPHONE (OUTPATIENT)
Dept: FAMILY MEDICINE CLINIC | Facility: CLINIC | Age: 69
End: 2019-09-26

## 2019-09-26 VITALS — SYSTOLIC BLOOD PRESSURE: 160 MMHG | DIASTOLIC BLOOD PRESSURE: 86 MMHG

## 2019-09-26 DIAGNOSIS — I10 ESSENTIAL HYPERTENSION: ICD-10-CM

## 2019-09-26 DIAGNOSIS — I10 ACCELERATED HYPERTENSION: Primary | ICD-10-CM

## 2019-09-26 RX ORDER — LISINOPRIL 20 MG/1
20 TABLET ORAL DAILY
Qty: 30 TABLET | Refills: 1 | Status: SHIPPED | OUTPATIENT
Start: 2019-09-26 | End: 2019-10-17 | Stop reason: DRUGHIGH

## 2019-09-26 NOTE — TELEPHONE ENCOUNTER
Her blood pressure is still too high.  I will increase her lisinopril to 20 mg daily.  Have her return to office next week for blood pressure check as well.

## 2019-10-03 ENCOUNTER — CLINICAL SUPPORT (OUTPATIENT)
Dept: FAMILY MEDICINE CLINIC | Facility: CLINIC | Age: 69
End: 2019-10-03

## 2019-10-03 ENCOUNTER — TELEPHONE (OUTPATIENT)
Dept: FAMILY MEDICINE CLINIC | Facility: CLINIC | Age: 69
End: 2019-10-03

## 2019-10-03 VITALS — SYSTOLIC BLOOD PRESSURE: 150 MMHG | DIASTOLIC BLOOD PRESSURE: 78 MMHG

## 2019-10-17 ENCOUNTER — OFFICE VISIT (OUTPATIENT)
Dept: FAMILY MEDICINE CLINIC | Facility: CLINIC | Age: 69
End: 2019-10-17

## 2019-10-17 VITALS
HEIGHT: 65 IN | DIASTOLIC BLOOD PRESSURE: 84 MMHG | TEMPERATURE: 97.8 F | RESPIRATION RATE: 16 BRPM | WEIGHT: 219 LBS | SYSTOLIC BLOOD PRESSURE: 146 MMHG | BODY MASS INDEX: 36.49 KG/M2 | OXYGEN SATURATION: 98 % | HEART RATE: 92 BPM

## 2019-10-17 DIAGNOSIS — I10 ESSENTIAL HYPERTENSION: Primary | ICD-10-CM

## 2019-10-17 DIAGNOSIS — E66.01 MORBIDLY OBESE (HCC): ICD-10-CM

## 2019-10-17 PROCEDURE — 99213 OFFICE O/P EST LOW 20 MIN: CPT | Performed by: PHYSICIAN ASSISTANT

## 2019-10-17 RX ORDER — METOPROLOL SUCCINATE 25 MG/1
25 TABLET, EXTENDED RELEASE ORAL DAILY
Qty: 30 TABLET | Refills: 0 | Status: SHIPPED | OUTPATIENT
Start: 2019-10-17 | End: 2019-11-13 | Stop reason: SDUPTHER

## 2019-10-17 RX ORDER — LISINOPRIL 40 MG/1
40 TABLET ORAL DAILY
Qty: 30 TABLET | Refills: 3 | Status: SHIPPED | OUTPATIENT
Start: 2019-10-17 | End: 2020-02-05 | Stop reason: SINTOL

## 2019-10-17 NOTE — PATIENT INSTRUCTIONS
Mediterranean Diet  A Mediterranean diet refers to food and lifestyle choices that are based on the traditions of countries located on the [place]. This way of eating has been shown to help prevent certain conditions and improve outcomes for people who have chronic diseases, like kidney disease and heart disease.  What are tips for following this plan?  Lifestyle  · Cook and eat meals together with your family, when possible.  · Drink enough fluid to keep your urine clear or pale yellow.  · Be physically active every day. This includes:  ? Aerobic exercise like running or swimming.  ? Leisure activities like gardening, walking, or housework.  · Get 7-8 hours of sleep each night.  · If recommended by your health care provider, drink red wine in moderation. This means 1 glass a day for nonpregnant women and 2 glasses a day for men. A glass of wine equals 5 oz (150 mL).  Reading food labels    · Check the serving size of packaged foods. For foods such as rice and pasta, the serving size refers to the amount of cooked product, not dry.  · Check the total fat in packaged foods. Avoid foods that have saturated fat or trans fats.  · Check the ingredients list for added sugars, such as corn syrup.  Shopping  · At the grocery store, buy most of your food from the areas near the walls of the store. This includes:  ? Fresh fruits and vegetables (produce).  ? Grains, beans, nuts, and seeds. Some of these may be available in unpackaged forms or large amounts (in bulk).  ? Fresh seafood.  ? Poultry and eggs.  ? Low-fat dairy products.  · Buy whole ingredients instead of prepackaged foods.  · Buy fresh fruits and vegetables in-season from local farmers markets.  · Buy frozen fruits and vegetables in resealable bags.  · If you do not have access to quality fresh seafood, buy precooked frozen shrimp or canned fish, such as tuna, salmon, or sardines.  · Buy small amounts of raw or cooked vegetables, salads, or olives from  the deli or salad bar at your store.  · Stock your pantry so you always have certain foods on hand, such as olive oil, canned tuna, canned tomatoes, rice, pasta, and beans.  Cooking  · Cook foods with extra-virgin olive oil instead of using butter or other vegetable oils.  · Have meat as a side dish, and have vegetables or grains as your main dish. This means having meat in small portions or adding small amounts of meat to foods like pasta or stew.  · Use beans or vegetables instead of meat in common dishes like chili or lasagna.  · [place] with different cooking methods. Try roasting or broiling vegetables instead of steaming or sautéeing them.  · Add frozen vegetables to soups, stews, pasta, or rice.  · Add nuts or seeds for added healthy fat at each meal. You can add these to yogurt, salads, or vegetable dishes.  · Marinate fish or vegetables using olive oil, lemon juice, garlic, and fresh herbs.  Meal planning    · Plan to eat 1 vegetarian meal one day each week. Try to work up to 2 vegetarian meals, if possible.  · Eat seafood 2 or more times a week.  · Have healthy snacks readily available, such as:  ? Vegetable sticks with hummus.  ? Greek yogurt.  ? Fruit and nut trail mix.  · Eat balanced meals throughout the week. This includes:  ? Fruit: 2-3 servings a day  ? Vegetables: 4-5 servings a day  ? Low-fat dairy: 2 servings a day  ? Fish, poultry, or lean meat: 1 serving a day  ? Beans and legumes: 2 or more servings a week  ? Nuts and seeds: 1-2 servings a day  ? Whole grains: 6-8 servings a day  ? Extra-virgin olive oil: 3-4 servings a day  · Limit red meat and sweets to only a few servings a month  What are my food choices?  · Mediterranean diet  ? Recommended  ? Grains: Whole-grain pasta. Brown rice. Bulgar wheat. Polenta. Couscous. Whole-wheat bread. Oatmeal. Quinoa.  ? Vegetables: Artichokes. Beets. Broccoli. Cabbage. Carrots. Eggplant. Green beans. Chard. Kale. Spinach. Onions. Leeks. Peas. Squash.  Tomatoes. Peppers. Radishes.  ? Fruits: Apples. Apricots. Avocado. Berries. Bananas. Cherries. Dates. Figs. Grapes. Kerline. Melon. Oranges. Peaches. Plums. Pomegranate.  ? Meats and other protein foods: Beans. Almonds. Sunflower seeds. Pine nuts. Peanuts. Cod. [place]. Scallops. Shrimp. Tuna. Tilapia. Clams. Oysters. Eggs.  ? Dairy: Low-fat milk. Cheese. Greek yogurt.  ? Beverages: Water. Red wine. Herbal tea.  ? Fats and oils: Extra virgin olive oil. Avocado oil. Grape seed oil.  ? Sweets and desserts: Greek yogurt with honey. Baked apples. Poached pears. Trail mix.  ? Seasoning and other foods: Basil. Cilantro. Coriander. Cumin. Mint. Parsley. Fuentes. Rosemary. Tarragon. Garlic. Oregano. Thyme. Pepper. Balsalmic vinegar. Tahini. Hummus. Tomato sauce. Olives. Mushrooms.  ? Limit these  ? Grains: Prepackaged pasta or rice dishes. Prepackaged cereal with added sugar.  ? Vegetables: Deep fried potatoes (french fries).  ? Fruits: Fruit canned in syrup.  ? Meats and other protein foods: Beef. Pork. Lamb. Poultry with skin. Hot dogs. Chowdary.  ? Dairy: Ice cream. Sour cream. Whole milk.  ? Beverages: Juice. Sugar-sweetened soft drinks. Beer. Liquor and spirits.  ? Fats and oils: Butter. Canola oil. Vegetable oil. Beef fat (tallow). Lard.  ? Sweets and desserts: Cookies. Cakes. Pies. Candy.  ? Seasoning and other foods: Mayonnaise. Premade sauces and marinades.  ? The items listed may not be a complete list. Talk with your dietitian about what dietary choices are right for you.  Summary  · The Mediterranean diet includes both food and lifestyle choices.  · Eat a variety of fresh fruits and vegetables, beans, nuts, seeds, and whole grains.  · Limit the amount of red meat and sweets that you eat.  · Talk with your health care provider about whether it is safe for you to drink red wine in moderation. This means 1 glass a day for nonpregnant women and 2 glasses a day for men. A glass of wine equals 5 oz (150 mL).  This information  is not intended to replace advice given to you by your health care provider. Make sure you discuss any questions you have with your health care provider.  Document Released: 08/10/2017 Document Revised: 09/12/2017 Document Reviewed: 08/10/2017  [employer] Interactive Patient Education © 2019 [employer]

## 2019-10-17 NOTE — PROGRESS NOTES
"Subjective   Hilaria Klein is a 69 y.o. female presents for   Chief Complaint   Patient presents with   • Hypertension     management       History of Present Illness     Hilaria is a 69-year-old female who presents for hypertension management.  She has gained 1 pound since September 19, 2019.  Currently taking lisinopril 40 mg daily. She is feel a little anxious and lightheaded at times.  Diet has been poor. She lives alone. She has been cutting back on salt/sodium intake and drinking more water.  Caffeine intake in 14 oz of coffee in the morning.  Denied any chest pain,shortness of air,wheezing, or swelling of ankles.  Bowel movements are daily without dark black tarry stools.  She is riding her exercise bike 4 days a week for one hour.     The following portions of the patient's history were reviewed and updated as appropriate: allergies, current medications, past family history, past medical history, past social history, past surgical history and problem list.    Review of Systems   Constitutional: Negative.    HENT: Negative.    Eyes: Negative.    Respiratory: Negative.  Negative for cough, chest tightness, shortness of breath and wheezing.    Cardiovascular: Negative.  Negative for chest pain, palpitations and leg swelling.   Gastrointestinal: Negative.    Endocrine: Negative.    Genitourinary: Negative.    Musculoskeletal: Negative.    Skin: Negative.    Allergic/Immunologic: Negative.    Neurological: Negative.  Negative for dizziness, light-headedness and headaches.   Hematological: Negative.    Psychiatric/Behavioral: Negative.  Negative for sleep disturbance.   All other systems reviewed and are negative.        Vitals:    10/17/19 1048 10/17/19 1100   BP: 148/86 146/84   BP Location:  Left arm   Patient Position:  Sitting   Cuff Size:  Adult   Pulse: 92    Resp: 16    Temp: 97.8 °F (36.6 °C)    SpO2: 98%    Weight: 99.3 kg (219 lb)    Height: 165.1 cm (65\")      Wt Readings from Last 3 Encounters: "   10/17/19 99.3 kg (219 lb)   09/19/19 98.9 kg (218 lb)   08/30/18 91.6 kg (202 lb)     BP Readings from Last 3 Encounters:   10/17/19 146/84   10/03/19 150/78   09/26/19 160/86     Social History     Socioeconomic History   • Marital status: Single     Spouse name: Not on file   • Number of children: Not on file   • Years of education: Not on file   • Highest education level: Not on file   Tobacco Use   • Smoking status: Never Smoker   • Smokeless tobacco: Never Used   Substance and Sexual Activity   • Alcohol use: No   • Drug use: No   • Sexual activity: Defer       Allergies   Allergen Reactions   • Sulfa Antibiotics Swelling   • Penicillins Hives       Body mass index is 36.44 kg/m².    Objective   Physical Exam   Constitutional: She is oriented to person, place, and time. Vital signs are normal. She appears well-developed and well-nourished.   Morbid obese female   Neck: Trachea normal and phonation normal. Neck supple. Normal carotid pulses, no hepatojugular reflux and no JVD present. No tracheal tenderness present. Carotid bruit is not present. No tracheal deviation and no edema present.   Cardiovascular: Normal rate, regular rhythm, S1 normal, S2 normal, normal heart sounds and normal pulses.   No murmur heard.  Pulmonary/Chest: Effort normal and breath sounds normal.   Abdominal: Soft. Normal appearance, normal aorta and bowel sounds are normal. There is no hepatomegaly. There is no tenderness.   Neurological: She is alert and oriented to person, place, and time.   Skin: Skin is warm, dry and intact. Capillary refill takes less than 2 seconds.   Psychiatric: She has a normal mood and affect. Her speech is normal and behavior is normal. Judgment and thought content normal. Cognition and memory are normal.       Assessment/Plan   Hilaria was seen today for hypertension.    Diagnoses and all orders for this visit:    Essential hypertension  -     lisinopril (PRINIVIL,ZESTRIL) 40 MG tablet; Take 1 tablet by  mouth Daily.  -     metoprolol succinate XL (TOPROL-XL) 25 MG 24 hr tablet; Take 1 tablet by mouth Daily.    Morbidly obese (CMS/HCC)    1.  Chronic uncontrolled hypertension: I have rechecked her blood pressure at office visit today and got 146/84 in left arm.  Will add Toprol-XL 25 mg to her lisinopril 40 mg prescription.  Of asked her to return to office in 1 month for reevaluation.  She will stop by office in the next 7-10 days for blood pressure check only.  I have encouraged her to continue to exercise and eat healthy.  2.  Chronic morbid obesity: Have encouraged her to try the Mediterranean diet to see if this will help her lose weight.  She has been given a diet plan.  Will reevaluate weight in 1 month.      NICKIE Lagos [facility]  [address] [place] [id number]  Dept: [phone]  Dept Fax: [phone]  Loc: [phone]  Loc Fax: [phone]

## 2019-10-24 ENCOUNTER — TELEPHONE (OUTPATIENT)
Dept: FAMILY MEDICINE CLINIC | Facility: CLINIC | Age: 69
End: 2019-10-24

## 2019-11-13 DIAGNOSIS — I10 ESSENTIAL HYPERTENSION: ICD-10-CM

## 2019-11-13 RX ORDER — METOPROLOL SUCCINATE 25 MG/1
25 TABLET, EXTENDED RELEASE ORAL DAILY
Qty: 30 TABLET | Refills: 3 | Status: SHIPPED | OUTPATIENT
Start: 2019-11-13 | End: 2020-03-10

## 2019-11-14 ENCOUNTER — OFFICE VISIT (OUTPATIENT)
Dept: FAMILY MEDICINE CLINIC | Facility: CLINIC | Age: 69
End: 2019-11-14

## 2019-11-14 VITALS
HEART RATE: 82 BPM | BODY MASS INDEX: 35.99 KG/M2 | HEIGHT: 65 IN | WEIGHT: 216 LBS | OXYGEN SATURATION: 98 % | TEMPERATURE: 98.4 F | RESPIRATION RATE: 16 BRPM | SYSTOLIC BLOOD PRESSURE: 130 MMHG | DIASTOLIC BLOOD PRESSURE: 76 MMHG

## 2019-11-14 DIAGNOSIS — I10 ESSENTIAL HYPERTENSION: Primary | ICD-10-CM

## 2019-11-14 DIAGNOSIS — Z23 NEED FOR PNEUMOCOCCAL VACCINATION: ICD-10-CM

## 2019-11-14 DIAGNOSIS — E03.9 ACQUIRED HYPOTHYROIDISM: ICD-10-CM

## 2019-11-14 PROCEDURE — 90670 PCV13 VACCINE IM: CPT | Performed by: PHYSICIAN ASSISTANT

## 2019-11-14 PROCEDURE — 99213 OFFICE O/P EST LOW 20 MIN: CPT | Performed by: PHYSICIAN ASSISTANT

## 2019-11-14 PROCEDURE — 90471 IMMUNIZATION ADMIN: CPT | Performed by: PHYSICIAN ASSISTANT

## 2019-11-14 NOTE — PROGRESS NOTES
"Subjective   Hilaria Klein is a 69 y.o. female presents for   Chief Complaint   Patient presents with   • Hypertension     management       History of Present Illness     Hilaria is a 69-year-old female who presents for hypertension management.  She has lost 3 pounds since October 17, 2019.  Currently taking lisinopril 40 mg daily.  She is taking Toprol XL 25 mg daily as well. She is feeling well.  Denied any lightheaded,dizziness,chest pain,shortness of air,wheezing,vision changes or vision changes.  Diet has been improving. She has cut down on snacks and salt.  Sleep has been normal.      The following portions of the patient's history were reviewed and updated as appropriate: allergies, current medications, past family history, past medical history, past social history, past surgical history and problem list.    Review of Systems   Constitutional: Negative.    HENT: Negative.    Eyes: Negative.    Respiratory: Negative.  Negative for cough, shortness of breath and wheezing.    Cardiovascular: Negative.  Negative for chest pain, palpitations and leg swelling.   Gastrointestinal: Negative.    Endocrine: Negative.    Genitourinary: Negative.    Musculoskeletal: Negative.    Skin: Negative.    Allergic/Immunologic: Negative.    Neurological: Negative.  Negative for dizziness and headaches.   Hematological: Negative.    Psychiatric/Behavioral: Negative.  Negative for sleep disturbance.   All other systems reviewed and are negative.        Vitals:    11/14/19 0904 11/14/19 0938   BP: 158/88 130/76   BP Location:  Left arm   Patient Position:  Sitting   Cuff Size:  Adult   Pulse: 82    Resp: 16    Temp: 98.4 °F (36.9 °C)    SpO2: 98%    Weight: 98 kg (216 lb)    Height: 165.1 cm (65\")      Wt Readings from Last 3 Encounters:   11/14/19 98 kg (216 lb)   10/17/19 99.3 kg (219 lb)   09/19/19 98.9 kg (218 lb)     BP Readings from Last 3 Encounters:   11/14/19 130/76   10/17/19 146/84   10/03/19 150/78     Social History "     Socioeconomic History   • Marital status: Single     Spouse name: Not on file   • Number of children: Not on file   • Years of education: Not on file   • Highest education level: Not on file   Tobacco Use   • Smoking status: Never Smoker   • Smokeless tobacco: Never Used   Substance and Sexual Activity   • Alcohol use: No   • Drug use: No   • Sexual activity: Defer       Allergies   Allergen Reactions   • Sulfa Antibiotics Swelling   • Penicillins Hives       Body mass index is 35.94 kg/m².    Objective   Physical Exam   Constitutional: She is oriented to person, place, and time. Vital signs are normal. She appears well-developed and well-nourished.   Obese female   Neck: Trachea normal and phonation normal. Neck supple. Normal carotid pulses, no hepatojugular reflux and no JVD present. No tracheal tenderness present. Carotid bruit is not present. No edema present. No thyroid mass and no thyromegaly present.   Cardiovascular: Normal rate, regular rhythm, S1 normal, S2 normal, normal heart sounds and normal pulses.   No murmur heard.  Pulmonary/Chest: Effort normal and breath sounds normal.   Abdominal: Soft. Normal appearance, normal aorta and bowel sounds are normal. There is no hepatomegaly. There is no tenderness.   Neurological: She is alert and oriented to person, place, and time.   Skin: Skin is warm, dry and intact. Capillary refill takes less than 2 seconds.   Psychiatric: She has a normal mood and affect. Her speech is normal and behavior is normal. Judgment and thought content normal. Cognition and memory are normal.       Assessment/Plan   Hilaria was seen today for hypertension.    Diagnoses and all orders for this visit:    Essential hypertension    Acquired hypothyroidism  -     TSH    Need for pneumococcal vaccination  -     Pneumococcal Conjugate Vaccine 13-Valent All      1.  Chronic and stable hypertension: I have rechecked her blood pressure at office visit today and got 130/76 in left arm.   She will continue her current blood pressure medication at home as directed.  Plan to follow-up in 6 months.  2.  Chronic and stable acquired hypothyroidism: We will check a TSH at office visit today.  She will be notified of test results and any medication changes.  3.  Need for Prevnar vaccination: Hilaria has given written consent to receive updated Prevnar immunization.  She will return to office in 1 year for her Pneumovax vaccination.    NICKIE [facility]os [facility]  [address] [place] [id number]  Dept: [phone]  Dept Fax: [phone]  Loc: [phone]  Loc Fax: [phone]

## 2019-11-15 DIAGNOSIS — E03.9 ACQUIRED HYPOTHYROIDISM: Primary | ICD-10-CM

## 2019-11-15 LAB — TSH SERPL DL<=0.005 MIU/L-ACNC: 0.2 UIU/ML (ref 0.27–4.2)

## 2019-11-15 RX ORDER — LEVOTHYROXINE SODIUM 137 UG/1
137 TABLET ORAL DAILY
Qty: 90 TABLET | Refills: 0 | Status: SHIPPED | OUTPATIENT
Start: 2019-11-15 | End: 2020-01-11 | Stop reason: DRUGHIGH

## 2019-11-23 DIAGNOSIS — E78.00 HYPERCHOLESTEROLEMIA: ICD-10-CM

## 2019-11-23 RX ORDER — SIMVASTATIN 40 MG
40 TABLET ORAL NIGHTLY
Qty: 90 TABLET | Refills: 0 | Status: SHIPPED | OUTPATIENT
Start: 2019-11-23 | End: 2020-02-21

## 2019-12-09 DIAGNOSIS — K21.00 GASTROESOPHAGEAL REFLUX DISEASE WITH ESOPHAGITIS: ICD-10-CM

## 2019-12-09 RX ORDER — ESOMEPRAZOLE MAGNESIUM 40 MG/1
CAPSULE, DELAYED RELEASE ORAL
Qty: 90 CAPSULE | Refills: 0 | Status: SHIPPED | OUTPATIENT
Start: 2019-12-09 | End: 2020-03-07

## 2020-01-09 DIAGNOSIS — E03.9 ACQUIRED HYPOTHYROIDISM: ICD-10-CM

## 2020-01-11 DIAGNOSIS — E03.9 ACQUIRED HYPOTHYROIDISM: Primary | ICD-10-CM

## 2020-01-11 LAB — TSH SERPL DL<=0.005 MIU/L-ACNC: 0.16 UIU/ML (ref 0.45–4.5)

## 2020-01-11 RX ORDER — LEVOTHYROXINE SODIUM 0.12 MG/1
125 TABLET ORAL DAILY
Qty: 90 TABLET | Refills: 0 | Status: SHIPPED | OUTPATIENT
Start: 2020-01-11 | End: 2020-04-07

## 2020-01-26 DIAGNOSIS — E03.9 ACQUIRED HYPOTHYROIDISM: ICD-10-CM

## 2020-01-27 RX ORDER — LEVOTHYROXINE SODIUM 137 UG/1
137 TABLET ORAL DAILY
Qty: 30 TABLET | Refills: 3 | OUTPATIENT
Start: 2020-01-27

## 2020-02-05 ENCOUNTER — OFFICE VISIT (OUTPATIENT)
Dept: FAMILY MEDICINE CLINIC | Facility: CLINIC | Age: 70
End: 2020-02-05

## 2020-02-05 ENCOUNTER — HOSPITAL ENCOUNTER (OUTPATIENT)
Dept: GENERAL RADIOLOGY | Facility: HOSPITAL | Age: 70
Discharge: HOME | End: 2020-02-05
Admitting: PHYSICIAN ASSISTANT

## 2020-02-05 VITALS
WEIGHT: 221 LBS | SYSTOLIC BLOOD PRESSURE: 130 MMHG | RESPIRATION RATE: 16 BRPM | OXYGEN SATURATION: 98 % | HEART RATE: 94 BPM | HEIGHT: 65 IN | DIASTOLIC BLOOD PRESSURE: 80 MMHG | TEMPERATURE: 98.2 F | BODY MASS INDEX: 36.82 KG/M2

## 2020-02-05 DIAGNOSIS — I10 ESSENTIAL HYPERTENSION: ICD-10-CM

## 2020-02-05 DIAGNOSIS — R05.3 CHRONIC COUGH: ICD-10-CM

## 2020-02-05 DIAGNOSIS — R05.3 CHRONIC COUGH: Primary | ICD-10-CM

## 2020-02-05 PROCEDURE — 71046 X-RAY EXAM CHEST 2 VIEWS: CPT

## 2020-02-05 PROCEDURE — 99214 OFFICE O/P EST MOD 30 MIN: CPT | Performed by: PHYSICIAN ASSISTANT

## 2020-02-05 RX ORDER — BENZONATATE 100 MG/1
100 CAPSULE ORAL 3 TIMES DAILY PRN
Qty: 30 CAPSULE | Refills: 0 | Status: SHIPPED | OUTPATIENT
Start: 2020-02-05 | End: 2020-02-26

## 2020-02-05 RX ORDER — DOXYCYCLINE HYCLATE 100 MG
100 TABLET ORAL 2 TIMES DAILY
Qty: 14 TABLET | Refills: 0 | Status: SHIPPED | OUTPATIENT
Start: 2020-02-05 | End: 2020-02-26

## 2020-02-05 RX ORDER — LOSARTAN POTASSIUM 100 MG/1
100 TABLET ORAL DAILY
Qty: 30 TABLET | Refills: 0 | Status: SHIPPED | OUTPATIENT
Start: 2020-02-05 | End: 2020-02-26

## 2020-02-05 NOTE — PROGRESS NOTES
"Subjective   Hilaria Klein is a 69 y.o. female presents for   Chief Complaint   Patient presents with   • Cough       History of Present Illness     Hilaria is a 69-year-old female who presents with a dry cough to yellow productive cough off and on since November 2019.  States that the cough comes and goes.  The cough last night was \"croupy\" sounding.  She has a scratchy throat,clear rhinorrhea,wheezing and post nasal drip.  Denied any fevers,chills,headaches,ear pressure, or shortness of air.    She is taking lisinopril 40 mg a day.  Hilaria has been taking Mucinex and Rosalinda Hatfield Plus without relief of cough. Appetite and sleep has been normal.  States she started the lisinopril medication for blood pressure in the fall 2019.  Denied any chest pain, dizziness, vision changes or swelling of ankles.    The following portions of the patient's history were reviewed and updated as appropriate: allergies, current medications, past family history, past medical history, past social history, past surgical history and problem list.    Review of Systems   Constitutional: Negative.  Negative for chills, fatigue and fever.   HENT: Positive for congestion, postnasal drip and rhinorrhea. Negative for ear pain, sinus pressure, sinus pain and sore throat.    Eyes: Negative.    Respiratory: Positive for cough and wheezing. Negative for shortness of breath.    Cardiovascular: Negative.  Negative for chest pain, palpitations and leg swelling.   Gastrointestinal: Negative.    Endocrine: Negative.    Genitourinary: Negative.    Musculoskeletal: Negative.    Skin: Negative.    Allergic/Immunologic: Negative.    Neurological: Negative.  Negative for dizziness, light-headedness and headaches.   Hematological: Negative.    Psychiatric/Behavioral: Negative.  Negative for sleep disturbance and suicidal ideas.   All other systems reviewed and are negative.        Vitals:    02/05/20 1614 02/05/20 1636   BP: 148/88 130/80   BP Location:  " "Left arm   Patient Position:  Sitting   Cuff Size:  Adult   Pulse: 94    Resp: 16    Temp: 98.2 °F (36.8 °C)    SpO2: 98%    Weight: 100 kg (221 lb)    Height: 165.1 cm (65\")      Wt Readings from Last 3 Encounters:   02/05/20 100 kg (221 lb)   11/14/19 98 kg (216 lb)   10/17/19 99.3 kg (219 lb)     BP Readings from Last 3 Encounters:   02/05/20 130/80   11/14/19 130/76   10/17/19 146/84     Social History     Socioeconomic History   • Marital status: Single     Spouse name: Not on file   • Number of children: Not on file   • Years of education: Not on file   • Highest education level: Not on file   Tobacco Use   • Smoking status: Never Smoker   • Smokeless tobacco: Never Used   Substance and Sexual Activity   • Alcohol use: No   • Drug use: No   • Sexual activity: Defer       Allergies   Allergen Reactions   • Sulfa Antibiotics Swelling   • Penicillins Hives       Body mass index is 36.78 kg/m².    Objective   Physical Exam   Constitutional: She is oriented to person, place, and time. Vital signs are normal. She appears well-developed and well-nourished.   Obese female   HENT:   Head: Normocephalic and atraumatic.   Right Ear: Hearing, tympanic membrane, external ear and ear canal normal.   Left Ear: Hearing, tympanic membrane, external ear and ear canal normal.   Nose: Nose normal. Right sinus exhibits no maxillary sinus tenderness and no frontal sinus tenderness. Left sinus exhibits no maxillary sinus tenderness and no frontal sinus tenderness.   Mouth/Throat: Uvula is midline and mucous membranes are normal.   1+ clear postnasal drainage noted   Eyes: Pupils are equal, round, and reactive to light. Conjunctivae, EOM and lids are normal.   Neck: Trachea normal and phonation normal. Neck supple. Normal carotid pulses, no hepatojugular reflux and no JVD present. No tracheal tenderness present. Carotid bruit is not present. No tracheal deviation and no edema present.   Cardiovascular: Normal rate, regular rhythm, S1 " normal, S2 normal, normal heart sounds and normal pulses.   No murmur heard.  Pulmonary/Chest: Effort normal and breath sounds normal.   Abdominal: Soft. Normal appearance, normal aorta and bowel sounds are normal. There is no hepatomegaly. There is no tenderness.   Lymphadenopathy:     She has no cervical adenopathy.   Neurological: She is alert and oriented to person, place, and time.   Skin: Skin is warm, dry and intact. Capillary refill takes less than 2 seconds.   Psychiatric: She has a normal mood and affect. Her speech is normal and behavior is normal. Judgment and thought content normal. Cognition and memory are normal.       Assessment/Plan   Hilaria was seen today for cough.    Diagnoses and all orders for this visit:    Chronic cough  -     XR Chest PA & Lateral; Future  -     doxycycline (VIBRAMYICN) 100 MG tablet; Take 1 tablet by mouth 2 (Two) Times a Day.  -     benzonatate (TESSALON PERLES) 100 MG capsule; Take 1 capsule by mouth 3 (Three) Times a Day As Needed for Cough.    Essential hypertension  -     losartan (COZAAR) 100 MG tablet; Take 1 tablet by mouth Daily.    1. Chronic  Cough: Hilaria has had a cough since Thanksgiving 2019.  We will check a chest x-ray at the [facility] facility today.  Have prescribed doxycycline and Tessalon Perles to pharmacy.  I suspect this may be related to her lisinopril.  I have asked her to stop the lisinopril and we will change to losartan medication.  2.  Chronic hypertension: We will stop the lisinopril medication due to possible cough.  Will start losartan 100 mg daily in its place.  She will continue her Metoprolol medication at home as directed.  Hilaria will return to office in 1 month for blood pressure evaluation.      NICKIE Lagos [facility]  [address] [facility] [id number]  Dept: [phone]  Dept Fax: [phone]  Loc: [phone]  Loc Fax: [phone]

## 2020-02-06 NOTE — PROGRESS NOTES
I have reviewed the notes, assessments, and/or procedures performed by Tennille FLORES, I concur with her/his documentation of Hilaria Klein.

## 2020-02-21 DIAGNOSIS — E78.00 HYPERCHOLESTEROLEMIA: ICD-10-CM

## 2020-02-21 RX ORDER — SIMVASTATIN 40 MG
40 TABLET ORAL NIGHTLY
Qty: 90 TABLET | Refills: 0 | Status: SHIPPED | OUTPATIENT
Start: 2020-02-21 | End: 2020-05-26

## 2020-02-26 ENCOUNTER — OFFICE VISIT (OUTPATIENT)
Dept: FAMILY MEDICINE CLINIC | Facility: CLINIC | Age: 70
End: 2020-02-26

## 2020-02-26 VITALS
OXYGEN SATURATION: 98 % | SYSTOLIC BLOOD PRESSURE: 130 MMHG | HEART RATE: 100 BPM | WEIGHT: 219 LBS | TEMPERATURE: 97.9 F | BODY MASS INDEX: 36.49 KG/M2 | RESPIRATION RATE: 16 BRPM | HEIGHT: 65 IN | DIASTOLIC BLOOD PRESSURE: 70 MMHG

## 2020-02-26 DIAGNOSIS — E66.01 MORBIDLY OBESE (HCC): ICD-10-CM

## 2020-02-26 DIAGNOSIS — E03.9 ACQUIRED HYPOTHYROIDISM: ICD-10-CM

## 2020-02-26 DIAGNOSIS — E55.9 VITAMIN D DEFICIENCY: ICD-10-CM

## 2020-02-26 DIAGNOSIS — E78.00 HYPERCHOLESTEROLEMIA: ICD-10-CM

## 2020-02-26 DIAGNOSIS — I10 ESSENTIAL HYPERTENSION: Primary | ICD-10-CM

## 2020-02-26 PROCEDURE — 99214 OFFICE O/P EST MOD 30 MIN: CPT | Performed by: PHYSICIAN ASSISTANT

## 2020-02-26 RX ORDER — LOSARTAN POTASSIUM 100 MG/1
100 TABLET ORAL DAILY
Qty: 90 TABLET | Refills: 3 | Status: SHIPPED | OUTPATIENT
Start: 2020-02-26 | End: 2021-02-19

## 2020-02-26 NOTE — PROGRESS NOTES
Subjective   Hilaria Klein is a 69 y.o. female presents for   Chief Complaint   Patient presents with   • Hypertension     Management       History of Present Illness     Hilaria is a 69-year-old female who presents for hypertension management.  She has lost 2 pounds since February 5, 2020.  Currently taking losartan 100 mg and Toprol 25 mg daily.  Stopped the lisinopril medication and started the losartan medication and  her cough has resolved. Hilaria has been checking her blood pressure at home.  States she is feeling very well at office visit today.  Denied any chest pain, shortness of air, dizziness, headache, or swelling of ankles.  She has been daily without dark black tarry stools.  Diet and sleep have been normal.  She has no complaints at office visit today.  She has been taking her thyroid medication as directed.      The following portions of the patient's history were reviewed and updated as appropriate: allergies, current medications, past family history, past medical history, past social history, past surgical history and problem list.    Review of Systems   Constitutional: Negative.    HENT: Negative.    Eyes: Negative.    Respiratory: Negative.  Negative for cough, chest tightness, shortness of breath and wheezing.    Cardiovascular: Negative.  Negative for chest pain, palpitations and leg swelling.   Gastrointestinal: Negative.    Endocrine: Negative.    Genitourinary: Negative.    Musculoskeletal: Negative.    Skin: Negative.    Allergic/Immunologic: Negative.    Neurological: Negative.  Negative for dizziness, light-headedness and headaches.   Hematological: Negative.    Psychiatric/Behavioral: Negative.    All other systems reviewed and are negative.        Vitals:    02/26/20 0838 02/26/20 0855   BP: 148/88 130/70   BP Location:  Left arm   Patient Position:  Sitting   Cuff Size:  Adult   Pulse: 100    Resp: 16    Temp: 97.9 °F (36.6 °C)    SpO2: 98%    Weight: 99.3 kg (219 lb)    Height:  "165.1 cm (65\")      Wt Readings from Last 3 Encounters:   02/26/20 99.3 kg (219 lb)   02/05/20 100 kg (221 lb)   11/14/19 98 kg (216 lb)     BP Readings from Last 3 Encounters:   02/26/20 130/70   02/05/20 130/80   11/14/19 130/76     Social History     Socioeconomic History   • Marital status: Single     Spouse name: Not on file   • Number of children: Not on file   • Years of education: Not on file   • Highest education level: Not on file   Tobacco Use   • Smoking status: Never Smoker   • Smokeless tobacco: Never Used   Substance and Sexual Activity   • Alcohol use: No   • Drug use: No   • Sexual activity: Defer       Allergies   Allergen Reactions   • Lisinopril Cough   • Sulfa Antibiotics Swelling   • Penicillins Hives       Body mass index is 36.44 kg/m².    Objective   Physical Exam   Constitutional: She is oriented to person, place, and time. Vital signs are normal. She appears well-developed and well-nourished.   Neck: Trachea normal and phonation normal. Neck supple. Normal carotid pulses, no hepatojugular reflux and no JVD present. No tracheal tenderness present. Carotid bruit is not present. No tracheal deviation and no edema present. No thyroid mass and no thyromegaly present.   Cardiovascular: Normal rate, regular rhythm, S1 normal, S2 normal, normal heart sounds and normal pulses.   No murmur heard.  Pulmonary/Chest: Effort normal and breath sounds normal.   Abdominal: Soft. Normal appearance, normal aorta and bowel sounds are normal. There is no hepatomegaly. There is no tenderness.   Neurological: She is alert and oriented to person, place, and time.   Skin: Skin is warm, dry and intact. Capillary refill takes less than 2 seconds.   Psychiatric: She has a normal mood and affect. Her speech is normal and behavior is normal. Judgment and thought content normal. Cognition and memory are normal.       Assessment/Plan   Hilaria was seen today for hypertension.    Diagnoses and all orders for this " visit:    Essential hypertension  -     losartan (COZAAR) 100 MG tablet; Take 1 tablet by mouth Daily.    Hypercholesterolemia  -     Comprehensive Metabolic Panel; Future  -     CBC & Differential; Future  -     Lipid Panel With LDL / HDL Ratio; Future    Vitamin D deficiency  -     Vitamin D 25 Hydroxy; Future    Morbidly obese (CMS/HCC)    Acquired hypothyroidism  -     TSH; Future    1.  Chronic and stable hypertension: I have rechecked her blood pressure at office visit today and got 130/70 in left arm.  Doing well with the losartan and Toprol medications.  I have refilled her losartan medication to her local pharmacy.  Plan to follow-up in 6 months for her Medicare wellness examination.  2.  Chronic and stable hypercholesterolemia: She is due for fasting lab work in March for follow-up on her cholesterol issues.  She will return to office in March for CBC, CMP and a lipid profile.  Hilaria will be notified of her test results when completed.  She will continue her current medications for now.  3.  Chronic and stable vitamin D level: I will check a vitamin D level with her fasting labs in March 2020.  She will be notified of test results and any medication changes.  4.  Chronic and stable morbid obese female: She has lost 2 pounds since February 5, 2020.  I have given her encouragement and praise for her weight loss.  She was encouraged to decrease her fatty food intake and to increase her physical activity.  Will reevaluate her weight at next office visit.  5.  Chronic and stable hypothyroidism: She is due to have her thyroid TSH rechecked in March 2020 due to medication adjustment in January 2020.  She will be notified of test results or any medication changes after lab work is completed.      NICKIE Lagos [facility]  [address] [place] [id number]  Dept: [phone]  Dept Fax: [phone]  Loc: [phone]  Loc Fax:  [phone]

## 2020-03-07 DIAGNOSIS — K21.00 GASTROESOPHAGEAL REFLUX DISEASE WITH ESOPHAGITIS: ICD-10-CM

## 2020-03-07 RX ORDER — ESOMEPRAZOLE MAGNESIUM 40 MG/1
CAPSULE, DELAYED RELEASE ORAL
Qty: 90 CAPSULE | Refills: 0 | Status: SHIPPED | OUTPATIENT
Start: 2020-03-07 | End: 2020-06-10

## 2020-03-10 DIAGNOSIS — I10 ESSENTIAL HYPERTENSION: ICD-10-CM

## 2020-03-10 RX ORDER — METOPROLOL SUCCINATE 25 MG/1
25 TABLET, EXTENDED RELEASE ORAL DAILY
Qty: 30 TABLET | Refills: 3 | Status: SHIPPED | OUTPATIENT
Start: 2020-03-10 | End: 2020-07-06

## 2020-03-11 ENCOUNTER — RESULTS ENCOUNTER (OUTPATIENT)
Dept: FAMILY MEDICINE CLINIC | Facility: CLINIC | Age: 70
End: 2020-03-11

## 2020-03-11 DIAGNOSIS — E03.9 ACQUIRED HYPOTHYROIDISM: ICD-10-CM

## 2020-03-25 LAB
25(OH)D3+25(OH)D2 SERPL-MCNC: 58.3 NG/ML (ref 30–100)
ALBUMIN SERPL-MCNC: 4.1 G/DL (ref 3.5–5.2)
ALBUMIN/GLOB SERPL: 1.3 G/DL
ALP SERPL-CCNC: 84 U/L (ref 39–117)
ALT SERPL-CCNC: 31 U/L (ref 1–33)
AST SERPL-CCNC: 22 U/L (ref 1–32)
BASOPHILS # BLD AUTO: 0.03 10*3/MM3 (ref 0–0.2)
BASOPHILS NFR BLD AUTO: 0.4 % (ref 0–1.5)
BILIRUB SERPL-MCNC: 0.7 MG/DL (ref 0.2–1.2)
BUN SERPL-MCNC: 12 MG/DL (ref 8–23)
BUN/CREAT SERPL: 15.6 (ref 7–25)
CALCIUM SERPL-MCNC: 9.6 MG/DL (ref 8.6–10.5)
CHLORIDE SERPL-SCNC: 97 MMOL/L (ref 98–107)
CHOLEST SERPL-MCNC: 181 MG/DL (ref 0–200)
CO2 SERPL-SCNC: 27.2 MMOL/L (ref 22–29)
CREAT SERPL-MCNC: 0.77 MG/DL (ref 0.57–1)
EOSINOPHIL # BLD AUTO: 0.31 10*3/MM3 (ref 0–0.4)
EOSINOPHIL NFR BLD AUTO: 3.7 % (ref 0.3–6.2)
ERYTHROCYTE [DISTWIDTH] IN BLOOD BY AUTOMATED COUNT: 12.8 % (ref 12.3–15.4)
GLOBULIN SER CALC-MCNC: 3.1 GM/DL
GLUCOSE SERPL-MCNC: 112 MG/DL (ref 65–99)
HCT VFR BLD AUTO: 41.9 % (ref 34–46.6)
HDLC SERPL-MCNC: 49 MG/DL (ref 40–60)
HGB BLD-MCNC: 14.3 G/DL (ref 12–15.9)
IMM GRANULOCYTES # BLD AUTO: 0.06 10*3/MM3 (ref 0–0.05)
IMM GRANULOCYTES NFR BLD AUTO: 0.7 % (ref 0–0.5)
LDLC SERPL CALC-MCNC: 107 MG/DL (ref 0–100)
LDLC/HDLC SERPL: 2.18 {RATIO}
LYMPHOCYTES # BLD AUTO: 2.22 10*3/MM3 (ref 0.7–3.1)
LYMPHOCYTES NFR BLD AUTO: 26.7 % (ref 19.6–45.3)
MCH RBC QN AUTO: 29.1 PG (ref 26.6–33)
MCHC RBC AUTO-ENTMCNC: 34.1 G/DL (ref 31.5–35.7)
MCV RBC AUTO: 85.2 FL (ref 79–97)
MONOCYTES # BLD AUTO: 0.68 10*3/MM3 (ref 0.1–0.9)
MONOCYTES NFR BLD AUTO: 8.2 % (ref 5–12)
NEUTROPHILS # BLD AUTO: 5.03 10*3/MM3 (ref 1.7–7)
NEUTROPHILS NFR BLD AUTO: 60.3 % (ref 42.7–76)
NRBC BLD AUTO-RTO: 0 /100 WBC (ref 0–0.2)
PLATELET # BLD AUTO: 355 10*3/MM3 (ref 140–450)
POTASSIUM SERPL-SCNC: 4.7 MMOL/L (ref 3.5–5.2)
PROT SERPL-MCNC: 7.2 G/DL (ref 6–8.5)
RBC # BLD AUTO: 4.92 10*6/MM3 (ref 3.77–5.28)
SODIUM SERPL-SCNC: 137 MMOL/L (ref 136–145)
TRIGL SERPL-MCNC: 126 MG/DL (ref 0–150)
TSH SERPL DL<=0.005 MIU/L-ACNC: 1.07 UIU/ML (ref 0.27–4.2)
VLDLC SERPL CALC-MCNC: 25.2 MG/DL
WBC # BLD AUTO: 8.33 10*3/MM3 (ref 3.4–10.8)

## 2020-03-26 ENCOUNTER — RESULTS ENCOUNTER (OUTPATIENT)
Dept: FAMILY MEDICINE CLINIC | Facility: CLINIC | Age: 70
End: 2020-03-26

## 2020-03-26 DIAGNOSIS — E55.9 VITAMIN D DEFICIENCY: ICD-10-CM

## 2020-03-26 DIAGNOSIS — E03.9 ACQUIRED HYPOTHYROIDISM: ICD-10-CM

## 2020-03-26 DIAGNOSIS — E78.00 HYPERCHOLESTEROLEMIA: ICD-10-CM

## 2020-03-26 PROBLEM — E11.65 TYPE 2 DIABETES MELLITUS WITH HYPERGLYCEMIA, WITHOUT LONG-TERM CURRENT USE OF INSULIN (HCC): Status: ACTIVE | Noted: 2020-03-26

## 2020-03-26 LAB
HBA1C MFR BLD: 6.5 % (ref 4.8–5.6)
WRITTEN AUTHORIZATION: NORMAL

## 2020-04-07 DIAGNOSIS — E03.9 ACQUIRED HYPOTHYROIDISM: ICD-10-CM

## 2020-04-07 RX ORDER — LEVOTHYROXINE SODIUM 0.12 MG/1
125 TABLET ORAL DAILY
Qty: 90 TABLET | Refills: 0 | Status: SHIPPED | OUTPATIENT
Start: 2020-04-07 | End: 2020-07-06

## 2020-05-26 DIAGNOSIS — E78.00 HYPERCHOLESTEROLEMIA: ICD-10-CM

## 2020-05-26 RX ORDER — SIMVASTATIN 40 MG
40 TABLET ORAL NIGHTLY
Qty: 90 TABLET | Refills: 0 | Status: SHIPPED | OUTPATIENT
Start: 2020-05-26 | End: 2020-08-24

## 2020-06-10 DIAGNOSIS — K21.00 GASTROESOPHAGEAL REFLUX DISEASE WITH ESOPHAGITIS: ICD-10-CM

## 2020-06-10 RX ORDER — ESOMEPRAZOLE MAGNESIUM 40 MG/1
CAPSULE, DELAYED RELEASE ORAL
Qty: 90 CAPSULE | Refills: 0 | Status: SHIPPED | OUTPATIENT
Start: 2020-06-10 | End: 2020-09-08

## 2020-07-03 DIAGNOSIS — I10 ESSENTIAL HYPERTENSION: ICD-10-CM

## 2020-07-03 DIAGNOSIS — E03.9 ACQUIRED HYPOTHYROIDISM: ICD-10-CM

## 2020-07-06 RX ORDER — LEVOTHYROXINE SODIUM 0.12 MG/1
125 TABLET ORAL DAILY
Qty: 90 TABLET | Refills: 0 | Status: SHIPPED | OUTPATIENT
Start: 2020-07-06 | End: 2020-09-21 | Stop reason: DRUGHIGH

## 2020-07-06 RX ORDER — METOPROLOL SUCCINATE 25 MG/1
25 TABLET, EXTENDED RELEASE ORAL DAILY
Qty: 30 TABLET | Refills: 3 | Status: SHIPPED | OUTPATIENT
Start: 2020-07-06 | End: 2020-11-03 | Stop reason: SDUPTHER

## 2020-08-23 DIAGNOSIS — E78.00 HYPERCHOLESTEROLEMIA: ICD-10-CM

## 2020-08-24 RX ORDER — SIMVASTATIN 40 MG
40 TABLET ORAL NIGHTLY
Qty: 90 TABLET | Refills: 0 | Status: SHIPPED | OUTPATIENT
Start: 2020-08-24 | End: 2020-11-20

## 2020-09-08 DIAGNOSIS — K21.00 GASTROESOPHAGEAL REFLUX DISEASE WITH ESOPHAGITIS: ICD-10-CM

## 2020-09-08 RX ORDER — ESOMEPRAZOLE MAGNESIUM 40 MG/1
CAPSULE, DELAYED RELEASE ORAL
Qty: 90 CAPSULE | Refills: 0 | Status: SHIPPED | OUTPATIENT
Start: 2020-09-08 | End: 2020-12-07

## 2020-09-11 DIAGNOSIS — E03.9 ACQUIRED HYPOTHYROIDISM: ICD-10-CM

## 2020-09-11 DIAGNOSIS — E11.65 TYPE 2 DIABETES MELLITUS WITH HYPERGLYCEMIA, WITHOUT LONG-TERM CURRENT USE OF INSULIN (HCC): ICD-10-CM

## 2020-09-11 DIAGNOSIS — I10 ESSENTIAL HYPERTENSION: ICD-10-CM

## 2020-09-11 DIAGNOSIS — E78.00 HYPERCHOLESTEROLEMIA: Primary | ICD-10-CM

## 2020-09-11 DIAGNOSIS — E55.9 VITAMIN D DEFICIENCY: ICD-10-CM

## 2020-09-14 ENCOUNTER — LAB (OUTPATIENT)
Dept: FAMILY MEDICINE CLINIC | Facility: CLINIC | Age: 70
End: 2020-09-14

## 2020-09-15 LAB
25(OH)D3+25(OH)D2 SERPL-MCNC: 54.9 NG/ML (ref 30–100)
ALBUMIN SERPL-MCNC: 4.3 G/DL (ref 3.5–5.2)
ALBUMIN/GLOB SERPL: 1.9 G/DL
ALP SERPL-CCNC: 104 U/L (ref 39–117)
ALT SERPL-CCNC: 32 U/L (ref 1–33)
AST SERPL-CCNC: 25 U/L (ref 1–32)
BASOPHILS # BLD AUTO: 0.06 10*3/MM3 (ref 0–0.2)
BASOPHILS NFR BLD AUTO: 0.8 % (ref 0–1.5)
BILIRUB SERPL-MCNC: 0.8 MG/DL (ref 0–1.2)
BUN SERPL-MCNC: 12 MG/DL (ref 8–23)
BUN/CREAT SERPL: 13.8 (ref 7–25)
CALCIUM SERPL-MCNC: 9 MG/DL (ref 8.6–10.5)
CHLORIDE SERPL-SCNC: 104 MMOL/L (ref 98–107)
CHOLEST SERPL-MCNC: 167 MG/DL (ref 0–200)
CHOLEST/HDLC SERPL: 3.98 {RATIO}
CO2 SERPL-SCNC: 24 MMOL/L (ref 22–29)
CREAT SERPL-MCNC: 0.87 MG/DL (ref 0.57–1)
EOSINOPHIL # BLD AUTO: 0.28 10*3/MM3 (ref 0–0.4)
EOSINOPHIL NFR BLD AUTO: 3.6 % (ref 0.3–6.2)
ERYTHROCYTE [DISTWIDTH] IN BLOOD BY AUTOMATED COUNT: 13.1 % (ref 12.3–15.4)
FT4I SERPL CALC-MCNC: 3 (ref 1.2–4.9)
GLOBULIN SER CALC-MCNC: 2.3 GM/DL
GLUCOSE SERPL-MCNC: 113 MG/DL (ref 65–99)
HBA1C MFR BLD: 6.2 % (ref 4.8–5.6)
HCT VFR BLD AUTO: 41.6 % (ref 34–46.6)
HDLC SERPL-MCNC: 42 MG/DL (ref 40–60)
HGB BLD-MCNC: 13.6 G/DL (ref 12–15.9)
IMM GRANULOCYTES # BLD AUTO: 0.06 10*3/MM3 (ref 0–0.05)
IMM GRANULOCYTES NFR BLD AUTO: 0.8 % (ref 0–0.5)
LDLC SERPL CALC-MCNC: 94 MG/DL (ref 0–100)
LYMPHOCYTES # BLD AUTO: 2.11 10*3/MM3 (ref 0.7–3.1)
LYMPHOCYTES NFR BLD AUTO: 27.4 % (ref 19.6–45.3)
MCH RBC QN AUTO: 28.7 PG (ref 26.6–33)
MCHC RBC AUTO-ENTMCNC: 32.7 G/DL (ref 31.5–35.7)
MCV RBC AUTO: 87.8 FL (ref 79–97)
MONOCYTES # BLD AUTO: 0.63 10*3/MM3 (ref 0.1–0.9)
MONOCYTES NFR BLD AUTO: 8.2 % (ref 5–12)
NEUTROPHILS # BLD AUTO: 4.57 10*3/MM3 (ref 1.7–7)
NEUTROPHILS NFR BLD AUTO: 59.2 % (ref 42.7–76)
NRBC BLD AUTO-RTO: 0 /100 WBC (ref 0–0.2)
PLATELET # BLD AUTO: 331 10*3/MM3 (ref 140–450)
POTASSIUM SERPL-SCNC: 4.3 MMOL/L (ref 3.5–5.2)
PROT SERPL-MCNC: 6.6 G/DL (ref 6–8.5)
RBC # BLD AUTO: 4.74 10*6/MM3 (ref 3.77–5.28)
SODIUM SERPL-SCNC: 140 MMOL/L (ref 136–145)
T3RU NFR SERPL: 26 % (ref 24–39)
T4 SERPL-MCNC: 11.7 UG/DL (ref 4.5–12)
TRIGL SERPL-MCNC: 153 MG/DL (ref 0–150)
TSH SERPL DL<=0.005 MIU/L-ACNC: 3.94 UIU/ML (ref 0.45–4.5)
VLDLC SERPL CALC-MCNC: 30.6 MG/DL
WBC # BLD AUTO: 7.71 10*3/MM3 (ref 3.4–10.8)

## 2020-09-21 ENCOUNTER — OFFICE VISIT (OUTPATIENT)
Dept: FAMILY MEDICINE CLINIC | Facility: CLINIC | Age: 70
End: 2020-09-21

## 2020-09-21 VITALS
HEART RATE: 101 BPM | WEIGHT: 224 LBS | SYSTOLIC BLOOD PRESSURE: 140 MMHG | DIASTOLIC BLOOD PRESSURE: 80 MMHG | TEMPERATURE: 97.9 F | BODY MASS INDEX: 37.32 KG/M2 | HEIGHT: 65 IN | OXYGEN SATURATION: 96 % | RESPIRATION RATE: 16 BRPM

## 2020-09-21 DIAGNOSIS — Z00.00 MEDICARE ANNUAL WELLNESS VISIT, SUBSEQUENT: Primary | ICD-10-CM

## 2020-09-21 DIAGNOSIS — Z23 NEED FOR INFLUENZA VACCINATION: ICD-10-CM

## 2020-09-21 DIAGNOSIS — I10 ESSENTIAL HYPERTENSION: ICD-10-CM

## 2020-09-21 DIAGNOSIS — E11.9 ENCOUNTER FOR DIABETIC FOOT EXAM (HCC): ICD-10-CM

## 2020-09-21 DIAGNOSIS — E03.9 ACQUIRED HYPOTHYROIDISM: ICD-10-CM

## 2020-09-21 DIAGNOSIS — E78.00 HYPERCHOLESTEROLEMIA: ICD-10-CM

## 2020-09-21 DIAGNOSIS — E11.65 TYPE 2 DIABETES MELLITUS WITH HYPERGLYCEMIA, WITHOUT LONG-TERM CURRENT USE OF INSULIN (HCC): ICD-10-CM

## 2020-09-21 DIAGNOSIS — F32.0 MILD MAJOR DEPRESSION (HCC): ICD-10-CM

## 2020-09-21 PROCEDURE — 90694 VACC AIIV4 NO PRSRV 0.5ML IM: CPT | Performed by: PHYSICIAN ASSISTANT

## 2020-09-21 PROCEDURE — G0439 PPPS, SUBSEQ VISIT: HCPCS | Performed by: PHYSICIAN ASSISTANT

## 2020-09-21 PROCEDURE — G0008 ADMIN INFLUENZA VIRUS VAC: HCPCS | Performed by: PHYSICIAN ASSISTANT

## 2020-09-21 PROCEDURE — 99212 OFFICE O/P EST SF 10 MIN: CPT | Performed by: PHYSICIAN ASSISTANT

## 2020-09-21 RX ORDER — CICLOPIROX 1 G/100ML
SHAMPOO TOPICAL
COMMUNITY
Start: 2020-09-09 | End: 2022-03-25

## 2020-09-21 RX ORDER — LEVOTHYROXINE SODIUM 137 UG/1
137 TABLET ORAL DAILY
Qty: 90 TABLET | Refills: 0 | Status: SHIPPED | OUTPATIENT
Start: 2020-09-21 | End: 2020-12-19

## 2020-09-21 NOTE — PROGRESS NOTES
The ABCs of the Annual Wellness Visit  Subsequent Medicare Wellness Visit    Chief Complaint   Patient presents with   • Medicare Wellness-subsequent       Subjective   History of Present Illness:  Hilaria Klein is a 70 y.o. female who presents for a Subsequent Medicare Wellness Visit.  Hilaria states overall she is feeling well today.  She states her 78-year-old sister was recently diagnosed with stage IV cancer.  They are unsure of where it started.  Is apparently had spreading quickly.  States she has been upset about this she has been helping take care of her sister.  She feels depressed but does not want any medication.  She has good support at home.  Denied any suicidal homicidal ideation.  Her bowel movements have been daily without dark black tarry stools.  Diet has not been as healthy.  Sleep has been normal.  She was walking 2 miles a day with her youngest sister.  States this has decreased since her older sister was diagnosed with cancer.  Last eye exam was 2 years ago.  She usually sees [facility] in [place] ophthalmology.  She denied any fevers, chills, chest pain, shortness of air, dizziness, vision changes, abdominal pain, suicidal or homicidal ideation or swelling of ankles.    HEALTH RISK ASSESSMENT    Recent Hospitalizations:  No hospitalization(s) within the last year.    Current Medical Providers:  Patient Care Team:  Tennille Lebron PA-C as PCP - General    Smoking Status:  Social History     Tobacco Use   Smoking Status Never Smoker   Smokeless Tobacco Never Used       Alcohol Consumption:  Social History     Substance and Sexual Activity   Alcohol Use No       Depression Screen:   PHQ-2/PHQ-9 Depression Screening 9/21/2020   Little interest or pleasure in doing things 0   Feeling down, depressed, or hopeless 0   Total Score 0       Fall Risk Screen:  STEADI Fall Risk Assessment was completed, and patient is at LOW risk for falls.Assessment completed on:9/21/2020    Health Habits and  Functional and Cognitive Screening:  Functional & Cognitive Status 9/21/2020   Do you have difficulty preparing food and eating? No   Do you have difficulty bathing yourself, getting dressed or grooming yourself? No   Do you have difficulty using the toilet? No   Do you have difficulty moving around from place to place? No   Do you have trouble with steps or getting out of a bed or a chair? No   Current Diet Unhealthy Diet   Dental Exam Up to date   Eye Exam Up to date   Exercise (times per week) 3 times per week   Current Exercise Activities Include Walking   Do you need help using the phone?  No   Are you deaf or do you have serious difficulty hearing?  No   Do you need help with transportation? No   Do you need help shopping? No   Do you need help preparing meals?  No   Do you need help with housework?  No   Do you need help with laundry? No   Do you need help taking your medications? No   Do you need help managing money? No   Do you ever drive or ride in a car without wearing a seat belt? No         Does the patient have evidence of cognitive impairment? No    Asprin use counseling:Does not need ASA (and currently is not on it)    Age-appropriate Screening Schedule:  Refer to the list below for future screening recommendations based on patient's age, sex and/or medical conditions. Orders for these recommended tests are listed in the plan section. The patient has been provided with a written plan.    Health Maintenance   Topic Date Due   • URINE MICROALBUMIN  1950   • DIABETIC EYE EXAM  07/25/2016   • INFLUENZA VACCINE  08/01/2020   • TDAP/TD VACCINES (1 - Tdap) 11/09/2020 (Originally 3/29/1969)   • ZOSTER VACCINE (1 of 2) 11/16/2020 (Originally 3/29/2002)   • HEMOGLOBIN A1C  03/14/2021   • LIPID PANEL  09/14/2021   • MAMMOGRAM  09/20/2021   • DIABETIC FOOT EXAM  09/21/2021   • COLONOSCOPY  10/30/2027          The following portions of the patient's history were reviewed and updated as appropriate:   She   has a past medical history of Colon polyp, GERD (gastroesophageal reflux disease), Goiter, and Skin cancer.  She does not have any pertinent problems on file.  She  has a past surgical history that includes Appendectomy; Knee Arthroplasty (Left); Colonoscopy w/ biopsies and polypectomy; Esophagogastroduodenoscopy (N/A, 10/30/2017); and Colonoscopy (N/A, 10/30/2017).  Her family history includes Deep vein thrombosis in an other family member; Hypertension in an other family member.  She  reports that she has never smoked. She has never used smokeless tobacco. She reports that she does not drink alcohol or use drugs.  Current Outpatient Medications   Medication Sig Dispense Refill   • Cholecalciferol (VITAMIN D3) 5000 units capsule capsule Take 5,000 Units by mouth Daily.     • ciclopirox (LOPROX) 1 % shampoo SHAMPOO INTO SCALP AND LET SIT ON FOR 5 MINUTES THREE TIMES WEEKLY     • esomeprazole (nexIUM) 40 MG capsule TAKE 1 CAPSULE BY MOUTH EVERY MORNING BEFORE BREAKFAST 90 capsule 0   • losartan (COZAAR) 100 MG tablet Take 1 tablet by mouth Daily. 90 tablet 3   • metoprolol succinate XL (TOPROL-XL) 25 MG 24 hr tablet TAKE 1 TABLET BY MOUTH DAILY 30 tablet 3   • simvastatin (ZOCOR) 40 MG tablet TAKE 1 TABLET BY MOUTH EVERY NIGHT 90 tablet 0   • levothyroxine (Synthroid) 137 MCG tablet Take 1 tablet by mouth Daily. 90 tablet 0     No current facility-administered medications for this visit.      Current Outpatient Medications on File Prior to Visit   Medication Sig   • Cholecalciferol (VITAMIN D3) 5000 units capsule capsule Take 5,000 Units by mouth Daily.   • ciclopirox (LOPROX) 1 % shampoo SHAMPOO INTO SCALP AND LET SIT ON FOR 5 MINUTES THREE TIMES WEEKLY   • esomeprazole (nexIUM) 40 MG capsule TAKE 1 CAPSULE BY MOUTH EVERY MORNING BEFORE BREAKFAST   • losartan (COZAAR) 100 MG tablet Take 1 tablet by mouth Daily.   • metoprolol succinate XL (TOPROL-XL) 25 MG 24 hr tablet TAKE 1 TABLET BY MOUTH DAILY   • simvastatin  (ZOCOR) 40 MG tablet TAKE 1 TABLET BY MOUTH EVERY NIGHT   • [DISCONTINUED] levothyroxine (SYNTHROID, LEVOTHROID) 125 MCG tablet TAKE 1 TABLET BY MOUTH DAILY     No current facility-administered medications on file prior to visit.      She is allergic to lisinopril; sulfa antibiotics; and penicillins..    Outpatient Medications Prior to Visit   Medication Sig Dispense Refill   • Cholecalciferol (VITAMIN D3) 5000 units capsule capsule Take 5,000 Units by mouth Daily.     • ciclopirox (LOPROX) 1 % shampoo SHAMPOO INTO SCALP AND LET SIT ON FOR 5 MINUTES THREE TIMES WEEKLY     • esomeprazole (nexIUM) 40 MG capsule TAKE 1 CAPSULE BY MOUTH EVERY MORNING BEFORE BREAKFAST 90 capsule 0   • losartan (COZAAR) 100 MG tablet Take 1 tablet by mouth Daily. 90 tablet 3   • metoprolol succinate XL (TOPROL-XL) 25 MG 24 hr tablet TAKE 1 TABLET BY MOUTH DAILY 30 tablet 3   • simvastatin (ZOCOR) 40 MG tablet TAKE 1 TABLET BY MOUTH EVERY NIGHT 90 tablet 0   • levothyroxine (SYNTHROID, LEVOTHROID) 125 MCG tablet TAKE 1 TABLET BY MOUTH DAILY 90 tablet 0     No facility-administered medications prior to visit.        Patient Active Problem List   Diagnosis   • Gastroesophageal reflux disease with esophagitis   • Hypercholesterolemia   • Acquired hypothyroidism   • Pruritus of vagina   • Vitamin D deficiency   • Medicare annual wellness visit, subsequent   • Pap smear, as part of routine gynecological examination   • Neoplasm of uncertain behavior   • Irregular heart beat   • Dizziness   • Screening mammogram, encounter for   • Encounter for screening colonoscopy   • Post-menopausal   • Encounter for screening for malignant neoplasm of colon   • Hyperplastic polyp of sigmoid colon   • Need for immunization against influenza   • Essential hypertension   • Osteopenia of neck of left femur   • Morbidly obese (CMS/[facility])   • Need for pneumococcal vaccination   • Chronic cough   • Type 2 diabetes mellitus with hyperglycemia, without long-term  "current use of insulin (CMS/[facility])       Advanced Care Planning:  ACP discussion was held with the patient during this visit. Patient does not have an advance directive, information provided.    Review of Systems   Constitutional: Negative.  Negative for chills, fatigue and fever.   HENT: Negative.  Negative for congestion, ear pain, postnasal drip, rhinorrhea, sinus pressure, sneezing and sore throat.    Eyes: Negative.    Respiratory: Negative.  Negative for cough, choking, chest tightness, shortness of breath and wheezing.    Cardiovascular: Negative.  Negative for chest pain, palpitations and leg swelling.   Gastrointestinal: Negative.  Negative for abdominal pain, blood in stool, constipation, diarrhea, nausea and vomiting.   Endocrine: Negative.    Genitourinary: Negative.  Negative for dysuria, frequency and urgency.   Musculoskeletal: Negative.    Skin: Negative.    Allergic/Immunologic: Negative.    Neurological: Negative.  Negative for dizziness and light-headedness.   Hematological: Negative.    Psychiatric/Behavioral: Negative.  Negative for sleep disturbance and suicidal ideas.   All other systems reviewed and are negative.      Compared to one year ago, the patient feels her physical health is the same.  Compared to one year ago, the patient feels her mental health is worse.    Reviewed chart for potential of high risk medication in the elderly: yes  Reviewed chart for potential of harmful drug interactions in the elderly:yes    Objective         Vitals:    09/21/20 0957 09/21/20 1029   BP: (!) 186/102 140/80   BP Location:  Left arm   Patient Position:  Sitting   Cuff Size:  Adult   Pulse: 101    Resp: 16    Temp: 97.9 °F (36.6 °C)    SpO2: 96%    Weight: 102 kg (224 lb)    Height: 165.1 cm (65\")        Body mass index is 37.28 kg/m².  Discussed the patient's BMI with her. The BMI is above average; BMI management plan is completed.    Physical Exam  Constitutional:       Appearance: Normal appearance. " She is well-developed and well-groomed. She is morbidly obese.      Interventions: Face mask in place.   HENT:      Head: Normocephalic and atraumatic.      Jaw: There is normal jaw occlusion.   Neck:      Musculoskeletal: Neck supple.      Thyroid: No thyroid mass, thyromegaly or thyroid tenderness.      Vascular: Normal carotid pulses. No carotid bruit, hepatojugular reflux or JVD.      Trachea: Trachea and phonation normal. No tracheal tenderness.   Cardiovascular:      Rate and Rhythm: Normal rate and regular rhythm.      Pulses: Normal pulses.      Heart sounds: Normal heart sounds, S1 normal and S2 normal. No murmur.   Pulmonary:      Effort: Pulmonary effort is normal.      Breath sounds: Normal breath sounds.   Abdominal:      General: Abdomen is flat. Bowel sounds are normal.      Palpations: Abdomen is soft. There is no hepatomegaly.      Tenderness: There is no abdominal tenderness. There is no right CVA tenderness, left CVA tenderness, guarding or rebound. Negative signs include Byrd's sign, Rovsing's sign, McBurney's sign, psoas sign and obturator sign.   Musculoskeletal:      Right foot: Normal range of motion. No deformity.      Left foot: Normal range of motion. No deformity.   Feet:      Right foot:      Protective Sensation: 10 sites tested. 10 sites sensed.      Skin integrity: Skin integrity normal.      Toenail Condition: Right toenails are normal.      Left foot:      Protective Sensation: 10 sites tested. 10 sites sensed.      Skin integrity: Skin integrity normal.      Toenail Condition: Left toenails are normal.   Skin:     General: Skin is warm and dry.      Capillary Refill: Capillary refill takes less than 2 seconds.   Neurological:      Mental Status: She is alert and oriented to person, place, and time.   Psychiatric:         Attention and Perception: Attention and perception normal.         Mood and Affect: Mood normal. Affect is tearful.         Speech: Speech normal.          Behavior: Behavior normal. Behavior is cooperative.         Thought Content: Thought content normal.         Cognition and Memory: Cognition and memory normal.         Judgment: Judgment normal.     Physical Exam     Feet: Diabetic foot exam performed during this visit.    Monofilament test performed  Right foot - number of sites tested - 10  Right foot - number of sites sensed - 10  Left foot number of sites tested 10  Left foot - number of sites sensed 10.  Right foot first MTP joint ROM is normal.  Left foot first MTP joint ROM is normal.  Vascular Status: right foot vasculature normal.  Left foot vasculature normal.  Right foot skin intact. Left foot skin intact.       I was wearing surgical mask during the entire office visit encounter.      Lab Results   Component Value Date     (H) 09/14/2020    CHLPL 167 09/14/2020    TRIG 153 (H) 09/14/2020    HDL 42 09/14/2020    LDL 94 09/14/2020    VLDL 30.6 09/14/2020    HGBA1C 6.20 (H) 09/14/2020        Assessment/Plan   Medicare Risks and Personalized Health Plan  CMS Preventative Services Quick Reference  Advance Directive Discussion  Depression/Dysphoria  Immunizations Discussed/Encouraged (specific immunizations; Influenza and Pneumococcal 23 )    The above risks/problems have been discussed with the patient.  Pertinent information has been shared with the patient in the After Visit Summary.  Follow up plans and orders are seen below in the Assessment/Plan Section.    Diagnoses and all orders for this visit:    1. Medicare annual wellness visit, subsequent (Primary)    2. Type 2 diabetes mellitus with hyperglycemia, without long-term current use of insulin (CMS/[facility])  -     Ambulatory Referral for Diabetic Eye Exam-Ophthalmology  -     MicroAlbumin, Urine, Random - Urine, Clean Catch    3. Hypercholesterolemia    4. Essential hypertension    5. Acquired hypothyroidism  -     levothyroxine (Synthroid) 137 MCG tablet; Take 1 tablet by mouth Daily.  Dispense: 90  tablet; Refill: 0  -     TSH; Future    6. Need for influenza vaccination  -     Fluad Quad 65+ yrs ([id number])    7. Encounter for diabetic foot exam (CMS/[facility])    8. Mild major depression (CMS/[facility])        1.  Annual Medicare wellness examination with hypertension: I have rechecked her blood pressure at office visit today and got 140/80 in left arm.  She will continue her current blood pressure medication at home.  Will return to office in 6 months.  2.  Chronic and stable type 2 diabetes with hypercholesterolemia and diabetic foot exam: I reviewed her lab work that was collected on September 14 with her at office visit today.  Fasting blood sugar was 113, cholesterol 167, triglycerides 153, HDL 42, LDL 94 and A1c was 6.2.  A1c and fasting blood sugar have decreased from 5 months ago.  Triglyceride readings have increased over the last 5 months.  3.  Chronic and uncontrolled hypothyroidism: Her TSH was 3.9.  This is not in therapeutic range.  I will increase her levothyroxine to 137 mcg daily.  New prescription was sent to pharmacy.  She will return to office for TSH in 6-8 weeks.  4.  Need for influenza vaccination: Hilaria is given written consent to receive updated high-dose flu shot today.  5.  New mild major depressive disorder: Her sister was recently diagnosed with stage IV cancer.  They are unsure where it originated.  Hilaria states she is emotional but handling everything fine.  She refuses any medication at this time.  She was instructed if she feels like she needs medication for her to call the office.      Patient Counseling:  --Nutrition: Stressed importance of moderation in sodium/caffeine intake, saturated fat and cholesterol.  Discussed caloric balance, sufficient intake of fresh fruits, vegetables, fiber,   calcium, iron.  --Discussed the new recommendation against daily use of baby aspirin for primary prevention in low risk patients.  --Exercise: Stressed the importance of regular exercise by  incorporating into daily routine.    --Substance Abuse: Discussed cessation/primary prevention of tobacco, alcohol, or other drug use; driving or other dangerous activities under the influence.    --Dental health: Discussed importance of regular tooth brushing, flossing, and dental visits.  -- Suggested having eyes and vision checked if needed or past due.  --Immunizations reviewed.  High dose flu shot given today will return to office for pneumonia shot  --Discussed benefits of screening colonoscopy.      Follow Up:  Return in about 2 months (around 11/21/2020).     An After Visit Summary and PPPS were given to the patient.      Orders Only on 09/11/2020   Component Date Value Ref Range Status   • Glucose 09/14/2020 113* 65 - 99 mg/dL Final   • BUN 09/14/2020 12  8 - 23 mg/dL Final   • Creatinine 09/14/2020 0.87  0.57 - 1.00 mg/dL Final   • eGFR Non  Am 09/14/2020 65  >60 mL/min/1.73 Final   • eGFR African Am 09/14/2020 78  >60 mL/min/1.73 Final   • BUN/Creatinine Ratio 09/14/2020 13.8  7.0 - 25.0 Final   • Sodium 09/14/2020 140  136 - 145 mmol/L Final   • Potassium 09/14/2020 4.3  3.5 - 5.2 mmol/L Final   • Chloride 09/14/2020 104  98 - 107 mmol/L Final   • Total CO2 09/14/2020 24.0  22.0 - 29.0 mmol/L Final   • Calcium 09/14/2020 9.0  8.6 - 10.5 mg/dL Final   • Total Protein 09/14/2020 6.6  6.0 - 8.5 g/dL Final   • Albumin 09/14/2020 4.30  3.50 - 5.20 g/dL Final   • Globulin 09/14/2020 2.3  gm/dL Final   • A/G Ratio 09/14/2020 1.9  g/dL Final   • Total Bilirubin 09/14/2020 0.8  0.0 - 1.2 mg/dL Final   • Alkaline Phosphatase 09/14/2020 104  39 - 117 U/L Final   • AST (SGOT) 09/14/2020 25  1 - 32 U/L Final   • ALT (SGPT) 09/14/2020 32  1 - 33 U/L Final   • WBC 09/14/2020 7.71  3.40 - 10.80 10*3/mm3 Final   • RBC 09/14/2020 4.74  3.77 - 5.28 10*6/mm3 Final   • Hemoglobin 09/14/2020 13.6  12.0 - 15.9 g/dL Final   • Hematocrit 09/14/2020 41.6  34.0 - 46.6 % Final   • MCV 09/14/2020 87.8  79.0 - 97.0 fL Final    • MCH 09/14/2020 28.7  26.6 - 33.0 pg Final   • MCHC 09/14/2020 32.7  31.5 - 35.7 g/dL Final   • RDW 09/14/2020 13.1  12.3 - 15.4 % Final   • Platelets 09/14/2020 331  140 - 450 10*3/mm3 Final   • Neutrophil Rel % 09/14/2020 59.2  42.7 - 76.0 % Final   • Lymphocyte Rel % 09/14/2020 27.4  19.6 - 45.3 % Final   • Monocyte Rel % 09/14/2020 8.2  5.0 - 12.0 % Final   • Eosinophil Rel % 09/14/2020 3.6  0.3 - 6.2 % Final   • Basophil Rel % 09/14/2020 0.8  0.0 - 1.5 % Final   • Neutrophils Absolute 09/14/2020 4.57  1.70 - 7.00 10*3/mm3 Final   • Lymphocytes Absolute 09/14/2020 2.11  0.70 - 3.10 10*3/mm3 Final   • Monocytes Absolute 09/14/2020 0.63  0.10 - 0.90 10*3/mm3 Final   • Eosinophils Absolute 09/14/2020 0.28  0.00 - 0.40 10*3/mm3 Final   • Basophils Absolute 09/14/2020 0.06  0.00 - 0.20 10*3/mm3 Final   • Immature Granulocyte Rel % 09/14/2020 0.8* 0.0 - 0.5 % Final   • Immature Grans Absolute 09/14/2020 0.06* 0.00 - 0.05 10*3/mm3 Final   • nRBC 09/14/2020 0.0  0.0 - 0.2 /100 WBC Final   • Total Cholesterol 09/14/2020 167  0 - 200 mg/dL Final   • Triglycerides 09/14/2020 153* 0 - 150 mg/dL Final   • HDL Cholesterol 09/14/2020 42  40 - 60 mg/dL Final   • VLDL Cholesterol Eliecer 09/14/2020 30.6  mg/dL Final   • LDL Chol Calc ([facility]) 09/14/2020 94  0 - 100 mg/dL Final   • Chol/HDL Ratio 09/14/2020 3.98   Final   • Hemoglobin A1C 09/14/2020 6.20* 4.80 - 5.60 % Final    Comment: Hemoglobin A1C Ranges:  Increased Risk for Diabetes  5.7% to 6.4%  Diabetes                     >= 6.5%  Diabetic Goal                < 7.0%     • TSH 09/14/2020 3.940  0.450 - 4.500 uIU/mL Final   • T4, Total 09/14/2020 11.7  4.5 - 12.0 ug/dL Final   • T3 Uptake 09/14/2020 26  24 - 39 % Final   • Free Thyroxine Index 09/14/2020 3.0  1.2 - 4.9 Final   • 25 Hydroxy, Vitamin D 09/14/2020 54.9  30.0 - 100.0 ng/ml Final    Comment: Results may be falsely increased if patient taking Biotin.  Reference Range for Total Vitamin D 25(OH)  Deficiency <20.0  ng/mL  Insufficiency 21-29 ng/mL  Sufficiency  ng/mL  Toxicity >100 ng/ml     {

## 2020-09-22 LAB — MICROALBUMIN UR-MCNC: 7.4 UG/ML

## 2020-09-22 NOTE — PROGRESS NOTES
I, Dr. Aman Farmer, have reviewed the notes, assessments, and/or procedures performed by Tennille FLORES, that occurred within our practice at [facility] location, I concur with her documentation of Hilaria Klein. I have a current collaborative medical agreement with Tennille FLORES.

## 2020-11-03 DIAGNOSIS — I10 ESSENTIAL HYPERTENSION: ICD-10-CM

## 2020-11-03 RX ORDER — METOPROLOL SUCCINATE 25 MG/1
25 TABLET, EXTENDED RELEASE ORAL DAILY
Qty: 30 TABLET | Refills: 3 | Status: SHIPPED | OUTPATIENT
Start: 2020-11-03 | End: 2021-10-25

## 2020-11-03 RX ORDER — METOPROLOL SUCCINATE 25 MG/1
25 TABLET, EXTENDED RELEASE ORAL DAILY
Qty: 90 TABLET | Refills: 3 | Status: SHIPPED | OUTPATIENT
Start: 2020-11-03 | End: 2020-11-03 | Stop reason: SDUPTHER

## 2020-11-20 DIAGNOSIS — E78.00 HYPERCHOLESTEROLEMIA: ICD-10-CM

## 2020-11-20 RX ORDER — SIMVASTATIN 40 MG
40 TABLET ORAL NIGHTLY
Qty: 90 TABLET | Refills: 0 | Status: SHIPPED | OUTPATIENT
Start: 2020-11-20 | End: 2021-02-19

## 2020-11-21 ENCOUNTER — RESULTS ENCOUNTER (OUTPATIENT)
Dept: FAMILY MEDICINE CLINIC | Facility: CLINIC | Age: 70
End: 2020-11-21

## 2020-11-21 DIAGNOSIS — E03.9 ACQUIRED HYPOTHYROIDISM: ICD-10-CM

## 2020-12-06 DIAGNOSIS — K21.00 GASTROESOPHAGEAL REFLUX DISEASE WITH ESOPHAGITIS: ICD-10-CM

## 2020-12-07 RX ORDER — ESOMEPRAZOLE MAGNESIUM 40 MG/1
CAPSULE, DELAYED RELEASE ORAL
Qty: 90 CAPSULE | Refills: 2 | Status: SHIPPED | OUTPATIENT
Start: 2020-12-07 | End: 2021-09-07

## 2020-12-15 LAB — TSH SERPL DL<=0.005 MIU/L-ACNC: 0.39 UIU/ML (ref 0.45–4.5)

## 2020-12-16 DIAGNOSIS — E03.9 ACQUIRED HYPOTHYROIDISM: Primary | ICD-10-CM

## 2020-12-18 DIAGNOSIS — E03.9 ACQUIRED HYPOTHYROIDISM: ICD-10-CM

## 2020-12-19 RX ORDER — LEVOTHYROXINE SODIUM 137 UG/1
TABLET ORAL
Qty: 90 TABLET | Refills: 0 | Status: SHIPPED | OUTPATIENT
Start: 2020-12-19 | End: 2021-03-08

## 2021-02-18 DIAGNOSIS — E78.00 HYPERCHOLESTEROLEMIA: ICD-10-CM

## 2021-02-18 DIAGNOSIS — I10 ESSENTIAL HYPERTENSION: ICD-10-CM

## 2021-02-19 RX ORDER — LOSARTAN POTASSIUM 100 MG/1
100 TABLET ORAL DAILY
Qty: 90 TABLET | Refills: 3 | Status: SHIPPED | OUTPATIENT
Start: 2021-02-19 | End: 2022-02-13

## 2021-02-19 RX ORDER — SIMVASTATIN 40 MG
40 TABLET ORAL NIGHTLY
Qty: 90 TABLET | Refills: 0 | Status: SHIPPED | OUTPATIENT
Start: 2021-02-19 | End: 2021-05-20

## 2021-02-24 ENCOUNTER — TELEPHONE (OUTPATIENT)
Dept: FAMILY MEDICINE CLINIC | Facility: CLINIC | Age: 71
End: 2021-02-24

## 2021-02-24 NOTE — TELEPHONE ENCOUNTER
Caller: Hilaria Klein    Relationship to patient: Self    Best call back number: [phone]     Patient is needing: Patient is wanting to schedule labs.  Unable to reach  to warm transfer.        Please advise.

## 2021-03-08 ENCOUNTER — OFFICE VISIT (OUTPATIENT)
Dept: FAMILY MEDICINE CLINIC | Facility: CLINIC | Age: 71
End: 2021-03-08

## 2021-03-08 VITALS
BODY MASS INDEX: 38.49 KG/M2 | OXYGEN SATURATION: 95 % | TEMPERATURE: 97.8 F | SYSTOLIC BLOOD PRESSURE: 124 MMHG | HEART RATE: 86 BPM | DIASTOLIC BLOOD PRESSURE: 80 MMHG | WEIGHT: 231 LBS | HEIGHT: 65 IN

## 2021-03-08 DIAGNOSIS — E11.65 TYPE 2 DIABETES MELLITUS WITH HYPERGLYCEMIA, WITHOUT LONG-TERM CURRENT USE OF INSULIN (HCC): ICD-10-CM

## 2021-03-08 DIAGNOSIS — E03.9 ACQUIRED HYPOTHYROIDISM: Primary | ICD-10-CM

## 2021-03-08 DIAGNOSIS — E55.9 VITAMIN D DEFICIENCY: ICD-10-CM

## 2021-03-08 DIAGNOSIS — E66.01 MORBIDLY OBESE (HCC): ICD-10-CM

## 2021-03-08 DIAGNOSIS — E78.00 HYPERCHOLESTEROLEMIA: ICD-10-CM

## 2021-03-08 DIAGNOSIS — I10 ESSENTIAL HYPERTENSION: ICD-10-CM

## 2021-03-08 PROCEDURE — 99214 OFFICE O/P EST MOD 30 MIN: CPT | Performed by: PHYSICIAN ASSISTANT

## 2021-03-08 RX ORDER — LEVOTHYROXINE SODIUM 137 UG/1
137 TABLET ORAL DAILY
Qty: 90 TABLET | Refills: 0
Start: 2021-03-08 | End: 2021-03-30

## 2021-03-08 RX ORDER — DIPHENOXYLATE HYDROCHLORIDE AND ATROPINE SULFATE 2.5; .025 MG/1; MG/1
TABLET ORAL DAILY
COMMUNITY
End: 2022-03-25

## 2021-03-08 NOTE — PROGRESS NOTES
"Chief Complaint  Hyperlipidemia (discuss labs), Hypothyroidism (management), and Diabetes (management)    Subjective          Hilaria Klein presents to [facility]  History of Present Illness    Hilaria is a 70-year-old female who presents for Type 2 diabetes,hypothyroidism and hyperlipidemia management.  States she has been taking her thyroid medication 6 days weekly.  Has been compliant with her blood pressure and cholesterol medicines as well.  Has been trying to eat healthier by decreasing sugar and carbohydrates in diet.  Sleep has been normal.  She is trying to be a little bit more active.  Denied any chest pain, shortness of air, dizziness, vision changes or swelling of ankles.  Has not been checking her blood sugars at home.  Currently diet controlled.  Bowel movements have been daily without dark black tarry stools.       Objective   Vital Signs:   /80   Pulse 86   Temp 97.8 °F (36.6 °C)   Ht 165.1 cm (65\")   Wt 105 kg (231 lb)   SpO2 95%   BMI 38.44 kg/m²     Physical Exam  Vitals and nursing note reviewed.   Constitutional:       Appearance: Normal appearance. She is well-developed and well-groomed. She is morbidly obese.      Interventions: Face mask in place.   HENT:      Head: Normocephalic and atraumatic.      Jaw: There is normal jaw occlusion.   Neck:      Thyroid: No thyroid mass, thyromegaly or thyroid tenderness.      Vascular: No carotid bruit.      Trachea: Trachea and phonation normal. No tracheal tenderness.   Cardiovascular:      Rate and Rhythm: Normal rate and regular rhythm.      Pulses: Normal pulses.      Heart sounds: Normal heart sounds, S1 normal and S2 normal. No murmur.   Pulmonary:      Effort: Pulmonary effort is normal.      Breath sounds: Normal breath sounds and air entry.   Abdominal:      General: Bowel sounds are normal.      Palpations: Abdomen is soft. There is no hepatomegaly.      Tenderness: There is no abdominal tenderness. "   Musculoskeletal:      Cervical back: Neck supple.      Right lower leg: No edema.      Left lower leg: No edema.   Skin:     General: Skin is warm and dry.      Capillary Refill: Capillary refill takes less than 2 seconds.   Neurological:      Mental Status: She is alert and oriented to person, place, and time.   Psychiatric:         Attention and Perception: Attention and perception normal.         Mood and Affect: Mood and affect normal.         Speech: Speech normal.         Behavior: Behavior normal. Behavior is cooperative.         Thought Content: Thought content normal.         Cognition and Memory: Cognition and memory normal.         Judgment: Judgment normal.     I was wearing a surgical mask and face shield during the entire office visit/encounter.     Patient's (Body mass index is 38.44 kg/m².) indicates that they are morbidly obese (BMI > 40 or > 35 with obesity - related health condition) with obesity-related health conditions that include hypertension, diabetes mellitus and dyslipidemias . Obesity is worsening. BMI is is above average; BMI management plan is completed. We discussed low calorie, low carb based diet program, portion control, increasing exercise, joining a fitness center or start home based exercise program and Weight Watchers or other Commercial based weight reduction program.     Result Review :     TSH    TSH 9/14/20 12/14/20 3/1/21   TSH 3.940 0.389 (A) 4.630 (A)   (A) Abnormal value                      Assessment and Plan    Diagnoses and all orders for this visit:    1. Acquired hypothyroidism (Primary)  -     levothyroxine (SYNTHROID, LEVOTHROID) 137 MCG tablet; Take 1 tablet by mouth Daily.  Dispense: 90 tablet; Refill: 0  -     TSH; Future    2. Essential hypertension    3. Type 2 diabetes mellitus with hyperglycemia, without long-term current use of insulin (CMS/[facility])  -     Ambulatory Referral for Diabetic Eye Exam-Ophthalmology  -     CBC & Differential; Future  -     Lipid  Panel With LDL / HDL Ratio; Future  -     Hemoglobin A1c; Future  -     Comprehensive Metabolic Panel; Future    4. Morbidly obese (CMS/HCC)    5. Hypercholesterolemia  -     CBC & Differential; Future  -     Lipid Panel With LDL / HDL Ratio; Future  -     Hemoglobin A1c; Future  -     Comprehensive Metabolic Panel; Future    6. Vitamin D deficiency  -     Vitamin D 25 hydroxy; Future    1.  Chronic and stable hypothyroidism: I have reviewed her TSH results with her at office visit today.  She will take her levothyroxine 137 mcg daily.  Will return to office in 6 weeks for repeat TSH.  2.  Chronic and stable hypertension: I have recheck blood pressure at office visit today and got 130/80 in left arm.  Doing well with her losartan medication.  No refills are needed  3.  Chronic type 2 diabetes with hyperlipidemia: Did not have fasting lab work performed with above blood work.  Will return to office in 6 weeks for CBC, CMP, lipid profile, and A1c.  Will be notified of test results and any medication changes.  4.  Chronic and stable morbid obesity: She has gained 7 pounds since September 21, 2020.  Stressed recent losing weight by dieting and exercising.  Have given her the Mediterranean diet.    Follow Up   No follow-ups on file.  Patient was given instructions and counseling regarding her condition or for health maintenance advice. Please see specific information pulled into the AVS if appropriate.     [facility]  [address] [place] [id number]  Dept: [phone]  Dept Fax: [phone]  Loc: [phone]  Loc Fax: [phone]

## 2021-03-08 NOTE — PATIENT INSTRUCTIONS
Mediterranean Diet  A Mediterranean diet refers to food and lifestyle choices that are based on the traditions of countries located on the [place]. This way of eating has been shown to help prevent certain conditions and improve outcomes for people who have chronic diseases, like kidney disease and heart disease.  What are tips for following this plan?  Lifestyle  · Cook and eat meals together with your family, when possible.  · Drink enough fluid to keep your urine clear or pale yellow.  · Be physically active every day. This includes:  ? Aerobic exercise like running or swimming.  ? Leisure activities like gardening, walking, or housework.  · Get 7-8 hours of sleep each night.  · If recommended by your health care provider, drink red wine in moderation. This means 1 glass a day for nonpregnant women and 2 glasses a day for men. A glass of wine equals 5 oz (150 mL).  Reading food labels    · Check the serving size of packaged foods. For foods such as rice and pasta, the serving size refers to the amount of cooked product, not dry.  · Check the total fat in packaged foods. Avoid foods that have saturated fat or trans fats.  · Check the ingredients list for added sugars, such as corn syrup.  Shopping  · At the grocery store, buy most of your food from the areas near the walls of the store. This includes:  ? Fresh fruits and vegetables (produce).  ? Grains, beans, nuts, and seeds. Some of these may be available in unpackaged forms or large amounts (in bulk).  ? Fresh seafood.  ? Poultry and eggs.  ? Low-fat dairy products.  · Buy whole ingredients instead of prepackaged foods.  · Buy fresh fruits and vegetables in-season from local farmers markets.  · Buy frozen fruits and vegetables in resealable bags.  · If you do not have access to quality fresh seafood, buy precooked frozen shrimp or canned fish, such as tuna, salmon, or sardines.  · Buy small amounts of raw or cooked vegetables, salads, or olives from  the deli or salad bar at your store.  · Stock your pantry so you always have certain foods on hand, such as olive oil, canned tuna, canned tomatoes, rice, pasta, and beans.  Cooking  · Cook foods with extra-virgin olive oil instead of using butter or other vegetable oils.  · Have meat as a side dish, and have vegetables or grains as your main dish. This means having meat in small portions or adding small amounts of meat to foods like pasta or stew.  · Use beans or vegetables instead of meat in common dishes like chili or lasagna.  · [place] with different cooking methods. Try roasting or broiling vegetables instead of steaming or sautéeing them.  · Add frozen vegetables to soups, stews, pasta, or rice.  · Add nuts or seeds for added healthy fat at each meal. You can add these to yogurt, salads, or vegetable dishes.  · Marinate fish or vegetables using olive oil, lemon juice, garlic, and fresh herbs.  Meal planning    · Plan to eat 1 vegetarian meal one day each week. Try to work up to 2 vegetarian meals, if possible.  · Eat seafood 2 or more times a week.  · Have healthy snacks readily available, such as:  ? Vegetable sticks with hummus.  ? Greek yogurt.  ? Fruit and nut trail mix.  · Eat balanced meals throughout the week. This includes:  ? Fruit: 2-3 servings a day  ? Vegetables: 4-5 servings a day  ? Low-fat dairy: 2 servings a day  ? Fish, poultry, or lean meat: 1 serving a day  ? Beans and legumes: 2 or more servings a week  ? Nuts and seeds: 1-2 servings a day  ? Whole grains: 6-8 servings a day  ? Extra-virgin olive oil: 3-4 servings a day  · Limit red meat and sweets to only a few servings a month  What are my food choices?  · Mediterranean diet  ? Recommended  § Grains: Whole-grain pasta. Brown rice. Bulgar wheat. Polenta. Couscous. Whole-wheat bread. Oatmeal. Quinoa.  § Vegetables: Artichokes. Beets. Broccoli. Cabbage. Carrots. Eggplant. Green beans. Chard. Kale. Spinach. Onions. Leeks. Peas. Squash.  Tomatoes. Peppers. Radishes.  § Fruits: Apples. Apricots. Avocado. Berries. Bananas. Cherries. Dates. Figs. Grapes. Kerline. Melon. Oranges. Peaches. Plums. Pomegranate.  § Meats and other protein foods: Beans. Almonds. Sunflower seeds. Pine nuts. Peanuts. Cod. Rulo. Scallops. Shrimp. Tuna. Tilapia. Clams. Oysters. Eggs.  § Dairy: Low-fat milk. Cheese. Greek yogurt.  § Beverages: Water. Red wine. Herbal tea.  § Fats and oils: Extra virgin olive oil. Avocado oil. Grape seed oil.  § Sweets and desserts: Greek yogurt with honey. Baked apples. Poached pears. Trail mix.  § Seasoning and other foods: Basil. Cilantro. Coriander. Cumin. Mint. Parsley. Fuentes. Rosemary. Tarragon. Garlic. Oregano. Thyme. Pepper. Balsalmic vinegar. Tahini. Hummus. Tomato sauce. Olives. Mushrooms.  ? Limit these  § Grains: Prepackaged pasta or rice dishes. Prepackaged cereal with added sugar.  § Vegetables: Deep fried potatoes (french fries).  § Fruits: Fruit canned in syrup.  § Meats and other protein foods: Beef. Pork. Lamb. Poultry with skin. Hot dogs. Chowdary.  § Dairy: Ice cream. Sour cream. Whole milk.  § Beverages: Juice. Sugar-sweetened soft drinks. Beer. Liquor and spirits.  § Fats and oils: Butter. Canola oil. Vegetable oil. Beef fat (tallow). Lard.  § Sweets and desserts: Cookies. Cakes. Pies. Candy.  § Seasoning and other foods: Mayonnaise. Premade sauces and marinades.  The items listed may not be a complete list. Talk with your dietitian about what dietary choices are right for you.  Summary  · The Mediterranean diet includes both food and lifestyle choices.  · Eat a variety of fresh fruits and vegetables, beans, nuts, seeds, and whole grains.  · Limit the amount of red meat and sweets that you eat.  · Talk with your health care provider about whether it is safe for you to drink red wine in moderation. This means 1 glass a day for nonpregnant women and 2 glasses a day for men. A glass of wine equals 5 oz (150 mL).  This information  is not intended to replace advice given to you by your health care provider. Make sure you discuss any questions you have with your health care provider.  Document Revised: 08/17/2017 Document Reviewed: 08/10/2017  [employer]vier Patient Education © 2020 [employer]

## 2021-03-30 DIAGNOSIS — E03.9 ACQUIRED HYPOTHYROIDISM: ICD-10-CM

## 2021-03-30 RX ORDER — LEVOTHYROXINE SODIUM 137 UG/1
TABLET ORAL
Qty: 90 TABLET | Refills: 0 | Status: SHIPPED | OUTPATIENT
Start: 2021-03-30 | End: 2021-06-28

## 2021-05-20 DIAGNOSIS — E78.00 HYPERCHOLESTEROLEMIA: ICD-10-CM

## 2021-05-20 RX ORDER — SIMVASTATIN 40 MG
TABLET ORAL
Qty: 90 TABLET | Refills: 0 | Status: SHIPPED | OUTPATIENT
Start: 2021-05-20 | End: 2021-08-17

## 2021-06-27 DIAGNOSIS — E03.9 ACQUIRED HYPOTHYROIDISM: ICD-10-CM

## 2021-06-28 RX ORDER — LEVOTHYROXINE SODIUM 137 UG/1
TABLET ORAL
Qty: 90 TABLET | Refills: 0 | Status: SHIPPED | OUTPATIENT
Start: 2021-06-28 | End: 2021-09-24 | Stop reason: DRUGHIGH

## 2021-08-17 DIAGNOSIS — E78.00 HYPERCHOLESTEROLEMIA: ICD-10-CM

## 2021-08-17 RX ORDER — SIMVASTATIN 40 MG
TABLET ORAL
Qty: 90 TABLET | Refills: 0 | Status: SHIPPED | OUTPATIENT
Start: 2021-08-17 | End: 2021-11-15

## 2021-09-06 DIAGNOSIS — K21.00 GASTROESOPHAGEAL REFLUX DISEASE WITH ESOPHAGITIS: ICD-10-CM

## 2021-09-07 RX ORDER — ESOMEPRAZOLE MAGNESIUM 40 MG/1
CAPSULE, DELAYED RELEASE ORAL
Qty: 90 CAPSULE | Refills: 2 | Status: SHIPPED | OUTPATIENT
Start: 2021-09-07 | End: 2022-03-25

## 2021-09-08 DIAGNOSIS — I10 ESSENTIAL HYPERTENSION: ICD-10-CM

## 2021-09-08 DIAGNOSIS — E03.9 ACQUIRED HYPOTHYROIDISM: ICD-10-CM

## 2021-09-08 DIAGNOSIS — E78.00 HYPERCHOLESTEROLEMIA: Primary | ICD-10-CM

## 2021-09-08 DIAGNOSIS — Z00.00 MEDICARE ANNUAL WELLNESS VISIT, SUBSEQUENT: ICD-10-CM

## 2021-09-08 DIAGNOSIS — E11.65 TYPE 2 DIABETES MELLITUS WITH HYPERGLYCEMIA, WITHOUT LONG-TERM CURRENT USE OF INSULIN (HCC): ICD-10-CM

## 2021-09-10 LAB
ALBUMIN SERPL-MCNC: 4.5 G/DL (ref 3.5–5.2)
ALBUMIN/GLOB SERPL: 2.1 G/DL
ALP SERPL-CCNC: 84 U/L (ref 39–117)
ALT SERPL-CCNC: 53 U/L (ref 1–33)
AST SERPL-CCNC: 36 U/L (ref 1–32)
BASOPHILS # BLD AUTO: 0.04 10*3/MM3 (ref 0–0.2)
BASOPHILS NFR BLD AUTO: 0.6 % (ref 0–1.5)
BILIRUB SERPL-MCNC: 0.7 MG/DL (ref 0–1.2)
BUN SERPL-MCNC: 15 MG/DL (ref 8–23)
BUN/CREAT SERPL: 18.3 (ref 7–25)
CALCIUM SERPL-MCNC: 9.3 MG/DL (ref 8.6–10.5)
CHLORIDE SERPL-SCNC: 100 MMOL/L (ref 98–107)
CHOLEST SERPL-MCNC: 165 MG/DL (ref 0–200)
CO2 SERPL-SCNC: 25.3 MMOL/L (ref 22–29)
CREAT SERPL-MCNC: 0.82 MG/DL (ref 0.57–1)
EOSINOPHIL # BLD AUTO: 0.26 10*3/MM3 (ref 0–0.4)
EOSINOPHIL NFR BLD AUTO: 3.8 % (ref 0.3–6.2)
ERYTHROCYTE [DISTWIDTH] IN BLOOD BY AUTOMATED COUNT: 12.6 % (ref 12.3–15.4)
FT4I SERPL CALC-MCNC: 3 (ref 1.2–4.9)
GLOBULIN SER CALC-MCNC: 2.1 GM/DL
GLUCOSE SERPL-MCNC: 110 MG/DL (ref 65–99)
HBA1C MFR BLD: 6.16 % (ref 4.8–5.6)
HCT VFR BLD AUTO: 42.3 % (ref 34–46.6)
HDLC SERPL-MCNC: 39 MG/DL (ref 40–60)
HGB BLD-MCNC: 13.8 G/DL (ref 12–15.9)
IMM GRANULOCYTES # BLD AUTO: 0.03 10*3/MM3 (ref 0–0.05)
IMM GRANULOCYTES NFR BLD AUTO: 0.4 % (ref 0–0.5)
LDLC SERPL CALC-MCNC: 104 MG/DL (ref 0–100)
LDLC/HDLC SERPL: 2.6 {RATIO}
LYMPHOCYTES # BLD AUTO: 1.68 10*3/MM3 (ref 0.7–3.1)
LYMPHOCYTES NFR BLD AUTO: 24.3 % (ref 19.6–45.3)
MCH RBC QN AUTO: 30.1 PG (ref 26.6–33)
MCHC RBC AUTO-ENTMCNC: 32.6 G/DL (ref 31.5–35.7)
MCV RBC AUTO: 92.2 FL (ref 79–97)
MONOCYTES # BLD AUTO: 0.65 10*3/MM3 (ref 0.1–0.9)
MONOCYTES NFR BLD AUTO: 9.4 % (ref 5–12)
NEUTROPHILS # BLD AUTO: 4.24 10*3/MM3 (ref 1.7–7)
NEUTROPHILS NFR BLD AUTO: 61.5 % (ref 42.7–76)
NRBC BLD AUTO-RTO: 0 /100 WBC (ref 0–0.2)
PLATELET # BLD AUTO: 287 10*3/MM3 (ref 140–450)
POTASSIUM SERPL-SCNC: 4.4 MMOL/L (ref 3.5–5.2)
PROT SERPL-MCNC: 6.6 G/DL (ref 6–8.5)
RBC # BLD AUTO: 4.59 10*6/MM3 (ref 3.77–5.28)
SODIUM SERPL-SCNC: 139 MMOL/L (ref 136–145)
T3RU NFR SERPL: 24 % (ref 24–39)
T4 SERPL-MCNC: 12.4 UG/DL (ref 4.5–12)
TRIGL SERPL-MCNC: 123 MG/DL (ref 0–150)
TSH SERPL DL<=0.005 MIU/L-ACNC: 5.86 UIU/ML (ref 0.45–4.5)
VLDLC SERPL CALC-MCNC: 22 MG/DL (ref 5–40)
WBC # BLD AUTO: 6.9 10*3/MM3 (ref 3.4–10.8)

## 2021-09-24 ENCOUNTER — OFFICE VISIT (OUTPATIENT)
Dept: FAMILY MEDICINE CLINIC | Facility: CLINIC | Age: 71
End: 2021-09-24

## 2021-09-24 VITALS
SYSTOLIC BLOOD PRESSURE: 120 MMHG | OXYGEN SATURATION: 94 % | HEART RATE: 82 BPM | TEMPERATURE: 97.7 F | HEIGHT: 65 IN | WEIGHT: 228 LBS | BODY MASS INDEX: 37.99 KG/M2 | DIASTOLIC BLOOD PRESSURE: 80 MMHG

## 2021-09-24 DIAGNOSIS — Z00.00 MEDICARE ANNUAL WELLNESS VISIT, SUBSEQUENT: Primary | ICD-10-CM

## 2021-09-24 DIAGNOSIS — E03.9 ACQUIRED HYPOTHYROIDISM: ICD-10-CM

## 2021-09-24 DIAGNOSIS — Z12.31 SCREENING MAMMOGRAM, ENCOUNTER FOR: ICD-10-CM

## 2021-09-24 DIAGNOSIS — Z01.419 PAP SMEAR, AS PART OF ROUTINE GYNECOLOGICAL EXAMINATION: ICD-10-CM

## 2021-09-24 DIAGNOSIS — Z23 NEED FOR VACCINATION AGAINST STREPTOCOCCUS PNEUMONIAE: ICD-10-CM

## 2021-09-24 DIAGNOSIS — Z78.0 MENOPAUSE: ICD-10-CM

## 2021-09-24 DIAGNOSIS — E11.65 TYPE 2 DIABETES MELLITUS WITH HYPERGLYCEMIA, WITHOUT LONG-TERM CURRENT USE OF INSULIN (HCC): ICD-10-CM

## 2021-09-24 PROCEDURE — G0439 PPPS, SUBSEQ VISIT: HCPCS | Performed by: PHYSICIAN ASSISTANT

## 2021-09-24 PROCEDURE — G0101 CA SCREEN;PELVIC/BREAST EXAM: HCPCS | Performed by: PHYSICIAN ASSISTANT

## 2021-09-24 PROCEDURE — G0009 ADMIN PNEUMOCOCCAL VACCINE: HCPCS | Performed by: PHYSICIAN ASSISTANT

## 2021-09-24 PROCEDURE — 82270 OCCULT BLOOD FECES: CPT | Performed by: PHYSICIAN ASSISTANT

## 2021-09-24 PROCEDURE — 90732 PPSV23 VACC 2 YRS+ SUBQ/IM: CPT | Performed by: PHYSICIAN ASSISTANT

## 2021-09-24 RX ORDER — LEVOTHYROXINE SODIUM 0.15 MG/1
150 TABLET ORAL DAILY
Qty: 30 TABLET | Refills: 3 | Status: SHIPPED | OUTPATIENT
Start: 2021-09-24 | End: 2022-01-21

## 2021-09-24 NOTE — PROGRESS NOTES
The ABCs of the Annual Wellness Visit  Subsequent Medicare Wellness Visit    Chief Complaint   Patient presents with   • Medicare Wellness-subsequent     pap   • Hypothyroidism   • Diabetes      Subjective    History of Present Illness:  Hilaria Klein is a 71 y.o. female who presents for a Subsequent Medicare Wellness Visit with Pap smear, hypothyroidism and type 2 diabetes.  She has lost 3 pounds since her last visit.  Diet has not been as healthy.  She has been trying to cut back on junk food.  Exercise is walking.  Sleep has been normal.  Bowel movements have been daily without dark black tarry stools.  Denied any fevers, chills, upper respiratory symptoms, chest pain, shortness of air, cough, wheezing, breast pain, breast discharge, abdominal pain, GI upset, urinary symptoms, vaginal discharge or swelling of ankles.  Has no complaints today..    The following portions of the patient's history were reviewed and   updated as appropriate:   She  has a past medical history of Colon polyp, GERD (gastroesophageal reflux disease), Goiter, and Skin cancer.  She does not have any pertinent problems on file.  She  has a past surgical history that includes Appendectomy; Knee Arthroplasty (Left); Colonoscopy w/ biopsies and polypectomy; Esophagogastroduodenoscopy (N/A, 10/30/2017); and Colonoscopy (N/A, 10/30/2017).  Her family history includes Deep vein thrombosis in an other family member; Hypertension in an other family member.  She  reports that she has never smoked. She has never used smokeless tobacco. She reports that she does not drink alcohol and does not use drugs.  Current Outpatient Medications   Medication Sig Dispense Refill   • Cholecalciferol (VITAMIN D3) 5000 units capsule capsule Take 5,000 Units by mouth Daily.     • esomeprazole (nexIUM) 40 MG capsule TAKE 1 CAPSULE BY MOUTH EVERY MORNING BEFORE BREAKFAST 90 capsule 2   • losartan (COZAAR) 100 MG tablet Take 1 tablet by mouth Daily. Need appointment  and labs before next refill. 90 tablet 3   • metoprolol succinate XL (TOPROL-XL) 25 MG 24 hr tablet TAKE 1 TABLET BY MOUTH DAILY 30 tablet 3   • multivitamin (MULTI-VITAMIN PO) Take  by mouth Daily.     • simvastatin (ZOCOR) 40 MG tablet TAKE 1 TABLET BY MOUTH EVERY NIGHT 90 tablet 0   • ciclopirox (LOPROX) 1 % shampoo SHAMPOO INTO SCALP AND LET SIT ON FOR 5 MINUTES THREE TIMES WEEKLY     • levothyroxine (Synthroid) 150 MCG tablet Take 1 tablet by mouth Daily. 30 tablet 3     No current facility-administered medications for this visit.     Current Outpatient Medications on File Prior to Visit   Medication Sig   • Cholecalciferol (VITAMIN D3) 5000 units capsule capsule Take 5,000 Units by mouth Daily.   • esomeprazole (nexIUM) 40 MG capsule TAKE 1 CAPSULE BY MOUTH EVERY MORNING BEFORE BREAKFAST   • losartan (COZAAR) 100 MG tablet Take 1 tablet by mouth Daily. Need appointment and labs before next refill.   • metoprolol succinate XL (TOPROL-XL) 25 MG 24 hr tablet TAKE 1 TABLET BY MOUTH DAILY   • multivitamin (MULTI-VITAMIN PO) Take  by mouth Daily.   • simvastatin (ZOCOR) 40 MG tablet TAKE 1 TABLET BY MOUTH EVERY NIGHT   • ciclopirox (LOPROX) 1 % shampoo SHAMPOO INTO SCALP AND LET SIT ON FOR 5 MINUTES THREE TIMES WEEKLY     No current facility-administered medications on file prior to visit.     She is allergic to lisinopril, sulfa antibiotics, and penicillins..    Compared to one year ago, the patient feels her physical   health is the same.    Compared to one year ago, the patient feels her mental   health is the same.    Recent Hospitalizations:  She was not admitted to the hospital during the last year.       Current Medical Providers:  Patient Care Team:  Tennille Lebron PA-C as PCP - General    Outpatient Medications Prior to Visit   Medication Sig Dispense Refill   • Cholecalciferol (VITAMIN D3) 5000 units capsule capsule Take 5,000 Units by mouth Daily.     • esomeprazole (nexIUM) 40 MG capsule TAKE 1  CAPSULE BY MOUTH EVERY MORNING BEFORE BREAKFAST 90 capsule 2   • losartan (COZAAR) 100 MG tablet Take 1 tablet by mouth Daily. Need appointment and labs before next refill. 90 tablet 3   • metoprolol succinate XL (TOPROL-XL) 25 MG 24 hr tablet TAKE 1 TABLET BY MOUTH DAILY 30 tablet 3   • multivitamin (MULTI-VITAMIN PO) Take  by mouth Daily.     • simvastatin (ZOCOR) 40 MG tablet TAKE 1 TABLET BY MOUTH EVERY NIGHT 90 tablet 0   • levothyroxine (SYNTHROID, LEVOTHROID) 137 MCG tablet TAKE 1 TABLET BY MOUTH EVERY DAY EXCEPT ON SUNDAY 90 tablet 0   • ciclopirox (LOPROX) 1 % shampoo SHAMPOO INTO SCALP AND LET SIT ON FOR 5 MINUTES THREE TIMES WEEKLY       No facility-administered medications prior to visit.       No opioid medication identified on active medication list. I have reviewed chart for other potential  high risk medication/s and harmful drug interactions in the elderly.          Aspirin is not on active medication list.  Aspirin use is not indicated based on review of current medical condition/s. Risk of harm outweighs potential benefits.  .    Patient Active Problem List   Diagnosis   • Gastroesophageal reflux disease with esophagitis   • Hypercholesterolemia   • Acquired hypothyroidism   • Pruritus of vagina   • Vitamin D deficiency   • Medicare annual wellness visit, subsequent   • Pap smear, as part of routine gynecological examination   • Neoplasm of uncertain behavior   • Irregular heart beat   • Dizziness   • Screening mammogram, encounter for   • Encounter for screening colonoscopy   • Post-menopausal   • Encounter for screening for malignant neoplasm of colon   • Hyperplastic polyp of sigmoid colon   • Need for immunization against influenza   • Essential hypertension   • Osteopenia of neck of left femur   • Morbidly obese (CMS/[facility])   • Need for pneumococcal vaccination   • Chronic cough   • Type 2 diabetes mellitus with hyperglycemia, without long-term current use of insulin (CMS/[facility])     Advance Care  "Planning  Advance Directive is not on file.  ACP discussion was held with the patient during this visit. Patient does not have an advance directive, declines further assistance.          Objective    Vitals:    09/24/21 1001   BP: 120/80   Pulse: 82   Temp: 97.7 °F (36.5 °C)   SpO2: 94%   Weight: 103 kg (228 lb)   Height: 165.1 cm (65\")   PainSc: 0-No pain     BMI Readings from Last 1 Encounters:   09/24/21 37.94 kg/m²   BMI is above normal parameters. Recommendations include: nutrition counseling    Does the patient have evidence of cognitive impairment? No    Physical Exam  Vitals and nursing note reviewed. Exam conducted with a chaperone present.   Constitutional:       Appearance: Normal appearance. She is well-developed and well-groomed. She is morbidly obese.      Interventions: Face mask in place.   HENT:      Head: Normocephalic and atraumatic.      Jaw: There is normal jaw occlusion.      Right Ear: Hearing, tympanic membrane, ear canal and external ear normal.      Left Ear: Hearing, tympanic membrane, ear canal and external ear normal.      Nose: Nose normal.      Right Sinus: No maxillary sinus tenderness or frontal sinus tenderness.      Left Sinus: No maxillary sinus tenderness or frontal sinus tenderness.      Mouth/Throat:      Lips: Pink.      Mouth: Mucous membranes are moist.      Tongue: No lesions.      Palate: No mass.      Pharynx: Oropharynx is clear. Uvula midline.   Eyes:      General: Lids are normal.      Conjunctiva/sclera: Conjunctivae normal.      Pupils: Pupils are equal, round, and reactive to light.   Neck:      Thyroid: No thyroid mass, thyromegaly or thyroid tenderness.      Trachea: Trachea and phonation normal. No tracheal tenderness.   Cardiovascular:      Rate and Rhythm: Normal rate and regular rhythm.      Pulses: Normal pulses.      Heart sounds: Normal heart sounds, S1 normal and S2 normal. No murmur heard.     Pulmonary:      Effort: Pulmonary effort is normal.      " Breath sounds: Normal breath sounds and air entry.   Chest:      Breasts: Breasts are symmetrical.         Right: Normal. No swelling, bleeding, inverted nipple, mass, nipple discharge, skin change or tenderness.         Left: Normal. No swelling, bleeding, inverted nipple, mass, nipple discharge, skin change or tenderness.   Abdominal:      General: Bowel sounds are normal.      Palpations: Abdomen is soft. There is no hepatomegaly.      Tenderness: There is no abdominal tenderness. There is no right CVA tenderness, left CVA tenderness, guarding or rebound. Negative signs include Byrd's sign, Rovsing's sign, McBurney's sign, psoas sign and obturator sign.      Hernia: There is no hernia in the left inguinal area or right inguinal area.   Genitourinary:     General: Normal vulva.      Exam position: Supine.      Labia:         Right: No rash, tenderness, lesion or injury.         Left: No rash, tenderness, lesion or injury.       Urethra: No prolapse, urethral pain, urethral swelling or urethral lesion.      Vagina: Normal.      Cervix: Normal.      Uterus: Normal.       Adnexa: Right adnexa normal and left adnexa normal.        Right: No mass, tenderness or fullness.          Left: No mass, tenderness or fullness.        Rectum: Normal. Guaiac result negative. No mass, tenderness, anal fissure, external hemorrhoid or internal hemorrhoid. Normal anal tone.      Comments: Exam chaperoned by Deanna Harkins MA  Musculoskeletal:      Cervical back: Neck supple. No edema.      Right lower leg: No edema.      Left lower leg: No edema.   Feet:      Right foot:      Protective Sensation: 10 sites tested. 10 sites sensed.      Skin integrity: Skin integrity normal.      Toenail Condition: Right toenails are normal.      Left foot:      Protective Sensation: 10 sites tested. 10 sites sensed.      Skin integrity: Skin integrity normal.      Toenail Condition: Left toenails are normal.      Comments: Diabetic Foot Exam  Performed and Monofilament Test Performed  Lymphadenopathy:      Cervical: No cervical adenopathy.      Right cervical: No superficial, deep or posterior cervical adenopathy.     Left cervical: No superficial, deep or posterior cervical adenopathy.      Upper Body:      Right upper body: No axillary adenopathy.      Left upper body: No axillary adenopathy.      Lower Body: No right inguinal adenopathy. No left inguinal adenopathy.   Skin:     General: Skin is warm and dry.      Capillary Refill: Capillary refill takes less than 2 seconds.   Neurological:      Mental Status: She is alert and oriented to person, place, and time.      Deep Tendon Reflexes: Reflexes are normal and symmetric.      Reflex Scores:       Patellar reflexes are 2+ on the right side and 2+ on the left side.  Psychiatric:         Attention and Perception: Attention and perception normal.         Mood and Affect: Mood and affect normal.         Speech: Speech normal.         Behavior: Behavior normal. Behavior is cooperative.         Thought Content: Thought content normal.         Cognition and Memory: Cognition and memory normal.         Judgment: Judgment normal.     I was wearing a surgical mask and face shield during the entire office visit/encounter.    Lab Results   Component Value Date     (H) 09/09/2021    CHLPL 165 09/09/2021    TRIG 123 09/09/2021    HDL 39 (L) 09/09/2021     (H) 09/09/2021    VLDL 22 09/09/2021    HGBA1C 6.16 (H) 09/09/2021            HEALTH RISK ASSESSMENT    Smoking Status:  Social History     Tobacco Use   Smoking Status Never Smoker   Smokeless Tobacco Never Used     Alcohol Consumption:  Social History     Substance and Sexual Activity   Alcohol Use No     Fall Risk Screen:    STEADI Fall Risk Assessment was completed, and patient is at LOW risk for falls.Assessment completed on:9/24/2021    Depression Screening:  PHQ-2/PHQ-9 Depression Screening 9/24/2021   Little interest or pleasure in doing  things 0   Feeling down, depressed, or hopeless 0   Trouble falling or staying asleep, or sleeping too much 0   Feeling tired or having little energy 0   Poor appetite or overeating 0   Feeling bad about yourself - or that you are a failure or have let yourself or your family down 0   Trouble concentrating on things, such as reading the newspaper or watching television 0   Moving or speaking so slowly that other people could have noticed. Or the opposite - being so fidgety or restless that you have been moving around a lot more than usual 0   Thoughts that you would be better off dead, or of hurting yourself in some way 0   Total Score 0   If you checked off any problems, how difficult have these problems made it for you to do your work, take care of things at home, or get along with other people? Not difficult at all       Health Habits and Functional and Cognitive Screening:  Functional & Cognitive Status 9/24/2021   Do you have difficulty preparing food and eating? No   Do you have difficulty bathing yourself, getting dressed or grooming yourself? No   Do you have difficulty using the toilet? No   Do you have difficulty moving around from place to place? No   Do you have trouble with steps or getting out of a bed or a chair? No   Current Diet Unhealthy Diet   Dental Exam Up to date   Eye Exam Up to date   Exercise (times per week) 7 times per week   Current Exercises Include Walking   Current Exercise Activities Include -   Do you need help using the phone?  No   Are you deaf or do you have serious difficulty hearing?  No   Do you need help with transportation? No   Do you need help shopping? No   Do you need help preparing meals?  No   Do you need help with housework?  No   Do you need help with laundry? No   Do you need help taking your medications? No   Do you need help managing money? No   Do you ever drive or ride in a car without wearing a seat belt? No   Have you felt unusual stress, anger or loneliness in  the last month? No   Who do you live with? Child   If you need help, do you have trouble finding someone available to you? No   Have you been bothered in the last four weeks by sexual problems? No   Do you have difficulty concentrating, remembering or making decisions? No       Age-appropriate Screening Schedule:  Refer to the list below for future screening recommendations based on patient's age, sex and/or medical conditions. Orders for these recommended tests are listed in the plan section. The patient has been provided with a written plan.    Health Maintenance   Topic Date Due   • DIABETIC EYE EXAM  Never done   • PAP SMEAR  08/30/2021   • MAMMOGRAM  09/20/2021   • URINE MICROALBUMIN  09/21/2021   • DXA SCAN  09/24/2021   • ZOSTER VACCINE (1 of 2) 03/17/2022 (Originally 3/29/2002)   • TDAP/TD VACCINES (2 - Tdap) 03/22/2022 (Originally 10/14/2008)   • INFLUENZA VACCINE  10/01/2021   • HEMOGLOBIN A1C  03/09/2022   • LIPID PANEL  09/09/2022   • DIABETIC FOOT EXAM  09/24/2022              Results for orders placed or performed in visit on 09/24/21   POC Occult Blood Stool    Specimen: Stool   Result Value Ref Range    Fecal Occult Blood Negative Negative    Lot Number [id number]     Expiration Date 1/31/23     DEVELOPER LOT NUMBER [id number]     DEVELOPER EXPIRATION DATE 1/23/31     Positive Control Positive Positive    Negative Control Negative Negative       Assessment/Plan   CMS Preventative Services Quick Reference  Risk Factors Identified During Encounter  Immunizations Discussed/Encouraged (specific Immunizations; Pneumococcal 23  Obesity/Overweight   The above risks/problems have been discussed with the patient.  Follow up actions/plans if indicated are seen below in the Assessment/Plan Section.  Pertinent information has been shared with the patient in the After Visit Summary.    Diagnoses and all orders for this visit:    1. Medicare annual wellness visit, subsequent (Primary)    2. Pap smear, as part of  routine gynecological examination  -     POC Occult Blood Stool  -     IGP,rfx Aptima HPV All Pth    3. Screening mammogram, encounter for  -     Mammo Screening Digital Tomosynthesis Bilateral With CAD; Future    4. Menopause  -     DEXA Bone Density Axial; Future    5. Need for vaccination against Streptococcus pneumoniae  -     Pneumococcal Polysaccharide Vaccine 23-Valent Greater Than or Equal To 1yo Subcutaneous / IM    6. Type 2 diabetes mellitus with hyperglycemia, without long-term current use of insulin (CMS/[facility])  -     Microalbumin / Creatinine Urine Ratio - Urine, Clean Catch    7. Acquired hypothyroidism  -     levothyroxine (Synthroid) 150 MCG tablet; Take 1 tablet by mouth Daily.  Dispense: 30 tablet; Refill: 3  -     TSH; Future    1.  Annual Medicare sequential physical examination with Pap smear: Pap smear was collected and sent to laboratory for further evaluation.  In office Hemoccult was negative.  She will be notified of test results when completed.  2.  Need for screening mammogram: I have placed orders for mammogram for [facility].  Will be notified of test results when completed.  3.  Chronic menopause: I will check DEXA scan with mammogram.  Will be notified of test results.  4.  Need for screening pneumococcal vaccination: Hilaria has given written consent to receive updated Pneumovax today.  Will get her flu shot in 2 weeks.  5.  Chronic and stable type 2 diabetes with hyperglycemia: I have reviewed above blood work with her.  Currently doing well with her diabetes.  A1c is in therapeutic range.  Cholesterol readings are stable as well.  We will recheck fasting lab work in 6 months.  6.  Chronic and uncontrolled hypothyroidism: TSH has increased.  I will increase her levothyroxine to 150 mcg daily.  Hilaria will return to office in 6-8 weeks for TSH.  Have sent new prescription to pharmacy.  She will stop the levothyroxine 137 mcg medication at home.    Follow Up:   Return  in about 6 months (around 3/24/2022), or 6 weeks for TSH- 6  onths for DM/chol with FBW.     An After Visit Summary and PPPS were made available to the patient.                 Patient Counseling:  --Nutrition: Stressed importance of moderation in sodium/caffeine intake, saturated fat and cholesterol.  Discussed caloric balance, sufficient intake of fresh fruits, vegetables, fiber,   calcium, iron.  --Discussed the new recommendation against daily use of baby aspirin for primary prevention in low risk patients.  --Exercise: Stressed the importance of regular exercise by incorporating into daily routine.    --Substance Abuse: Discussed cessation/primary prevention of tobacco, alcohol, or other drug use; driving or other dangerous activities under the influence.    --Dental health: Discussed importance of regular tooth brushing, flossing, and dental visits.  -- Suggested having eyes and vision checked if needed or past due.  --Immunizations reviewed.  --Discussed benefits of screening colonoscopy.    NICKIE Lagos [facility]  [address] [place] [id number]  Dept: [phone]  Dept Fax: [phone]  Loc: [phone]  Loc Fax: [phone]

## 2021-09-25 LAB
ALBUMIN/CREAT UR: <14 MG/G CREAT (ref 0–29)
CREAT UR-MCNC: 21.5 MG/DL
MICROALBUMIN UR-MCNC: <3 UG/ML

## 2021-09-27 LAB
CONV .: NORMAL
CYTOLOGIST CVX/VAG CYTO: NORMAL
CYTOLOGY CVX/VAG DOC CYTO: NORMAL
CYTOLOGY CVX/VAG DOC THIN PREP: NORMAL
DX ICD CODE: NORMAL
HIV 1 & 2 AB SER-IMP: NORMAL
OTHER STN SPEC: NORMAL
STAT OF ADQ CVX/VAG CYTO-IMP: NORMAL

## 2021-10-14 ENCOUNTER — HOSPITAL ENCOUNTER (OUTPATIENT)
Dept: MAMMOGRAPHY | Facility: HOSPITAL | Age: 71
End: 2021-10-14

## 2021-10-14 ENCOUNTER — APPOINTMENT (OUTPATIENT)
Dept: BONE DENSITY | Facility: HOSPITAL | Age: 71
End: 2021-10-14

## 2021-10-14 ENCOUNTER — HOSPITAL ENCOUNTER (OUTPATIENT)
Dept: BONE DENSITY | Facility: HOSPITAL | Age: 71
Discharge: HOME | End: 2021-10-14

## 2021-10-14 ENCOUNTER — HOSPITAL ENCOUNTER (OUTPATIENT)
Dept: MAMMOGRAPHY | Facility: HOSPITAL | Age: 71
Discharge: HOME | End: 2021-10-14

## 2021-10-14 DIAGNOSIS — Z12.31 SCREENING MAMMOGRAM, ENCOUNTER FOR: ICD-10-CM

## 2021-10-14 DIAGNOSIS — Z78.0 MENOPAUSE: ICD-10-CM

## 2021-10-14 PROCEDURE — 77067 SCR MAMMO BI INCL CAD: CPT

## 2021-10-14 PROCEDURE — 77063 BREAST TOMOSYNTHESIS BI: CPT

## 2021-10-14 PROCEDURE — 77080 DXA BONE DENSITY AXIAL: CPT

## 2021-10-24 DIAGNOSIS — I10 ESSENTIAL HYPERTENSION: ICD-10-CM

## 2021-10-25 RX ORDER — METOPROLOL SUCCINATE 25 MG/1
25 TABLET, EXTENDED RELEASE ORAL DAILY
Qty: 90 TABLET | Refills: 0 | Status: SHIPPED | OUTPATIENT
Start: 2021-10-25 | End: 2022-01-26

## 2021-11-13 DIAGNOSIS — E78.00 HYPERCHOLESTEROLEMIA: ICD-10-CM

## 2021-11-15 RX ORDER — SIMVASTATIN 40 MG
TABLET ORAL
Qty: 90 TABLET | Refills: 2 | Status: SHIPPED | OUTPATIENT
Start: 2021-11-15 | End: 2022-08-14

## 2022-01-21 DIAGNOSIS — E03.9 ACQUIRED HYPOTHYROIDISM: ICD-10-CM

## 2022-01-21 RX ORDER — LEVOTHYROXINE SODIUM 0.15 MG/1
150 TABLET ORAL DAILY
Qty: 30 TABLET | Refills: 3 | Status: SHIPPED | OUTPATIENT
Start: 2022-01-21 | End: 2022-05-24

## 2022-01-26 DIAGNOSIS — I10 ESSENTIAL HYPERTENSION: ICD-10-CM

## 2022-01-26 RX ORDER — METOPROLOL SUCCINATE 25 MG/1
25 TABLET, EXTENDED RELEASE ORAL DAILY
Qty: 90 TABLET | Refills: 0 | Status: SHIPPED | OUTPATIENT
Start: 2022-01-26 | End: 2022-05-04

## 2022-02-12 DIAGNOSIS — I10 ESSENTIAL HYPERTENSION: ICD-10-CM

## 2022-02-13 RX ORDER — LOSARTAN POTASSIUM 100 MG/1
TABLET ORAL
Qty: 90 TABLET | Refills: 0 | Status: SHIPPED | OUTPATIENT
Start: 2022-02-13 | End: 2022-05-14

## 2022-03-15 DIAGNOSIS — E78.00 HYPERCHOLESTEROLEMIA: ICD-10-CM

## 2022-03-15 DIAGNOSIS — E03.9 ACQUIRED HYPOTHYROIDISM: ICD-10-CM

## 2022-03-15 DIAGNOSIS — I10 ESSENTIAL HYPERTENSION: ICD-10-CM

## 2022-03-15 DIAGNOSIS — E11.65 TYPE 2 DIABETES MELLITUS WITH HYPERGLYCEMIA, WITHOUT LONG-TERM CURRENT USE OF INSULIN: Primary | ICD-10-CM

## 2022-03-18 LAB
ALBUMIN SERPL-MCNC: 4.2 G/DL (ref 3.8–4.8)
ALBUMIN/GLOB SERPL: 1.5 {RATIO} (ref 1.2–2.2)
ALP SERPL-CCNC: 89 IU/L (ref 44–121)
ALT SERPL-CCNC: 36 IU/L (ref 0–32)
AST SERPL-CCNC: 28 IU/L (ref 0–40)
BASOPHILS # BLD AUTO: 0.1 X10E3/UL (ref 0–0.2)
BASOPHILS NFR BLD AUTO: 1 %
BILIRUB SERPL-MCNC: 0.4 MG/DL (ref 0–1.2)
BUN SERPL-MCNC: 10 MG/DL (ref 8–27)
BUN/CREAT SERPL: 13 (ref 12–28)
CALCIUM SERPL-MCNC: 9.4 MG/DL (ref 8.7–10.3)
CHLORIDE SERPL-SCNC: 100 MMOL/L (ref 96–106)
CHOLEST SERPL-MCNC: 142 MG/DL (ref 100–199)
CO2 SERPL-SCNC: 23 MMOL/L (ref 20–29)
CREAT SERPL-MCNC: 0.78 MG/DL (ref 0.57–1)
EGFRCR SERPLBLD CKD-EPI 2021: 82 ML/MIN/1.73
EOSINOPHIL # BLD AUTO: 0.4 X10E3/UL (ref 0–0.4)
EOSINOPHIL NFR BLD AUTO: 4 %
ERYTHROCYTE [DISTWIDTH] IN BLOOD BY AUTOMATED COUNT: 13 % (ref 11.7–15.4)
FT4I SERPL CALC-MCNC: 4.5 (ref 1.2–4.9)
GLOBULIN SER CALC-MCNC: 2.8 G/DL (ref 1.5–4.5)
GLUCOSE SERPL-MCNC: 105 MG/DL (ref 65–99)
HBA1C MFR BLD: 6.2 % (ref 4.8–5.6)
HCT VFR BLD AUTO: 43.1 % (ref 34–46.6)
HDLC SERPL-MCNC: 43 MG/DL
HGB BLD-MCNC: 14.3 G/DL (ref 11.1–15.9)
IMM GRANULOCYTES # BLD AUTO: 0 X10E3/UL (ref 0–0.1)
IMM GRANULOCYTES NFR BLD AUTO: 0 %
LDLC SERPL CALC-MCNC: 75 MG/DL (ref 0–99)
LDLC/HDLC SERPL: 1.7 RATIO (ref 0–3.2)
LYMPHOCYTES # BLD AUTO: 2.1 X10E3/UL (ref 0.7–3.1)
LYMPHOCYTES NFR BLD AUTO: 23 %
MCH RBC QN AUTO: 29.1 PG (ref 26.6–33)
MCHC RBC AUTO-ENTMCNC: 33.2 G/DL (ref 31.5–35.7)
MCV RBC AUTO: 88 FL (ref 79–97)
MONOCYTES # BLD AUTO: 0.7 X10E3/UL (ref 0.1–0.9)
MONOCYTES NFR BLD AUTO: 8 %
NEUTROPHILS # BLD AUTO: 5.8 X10E3/UL (ref 1.4–7)
NEUTROPHILS NFR BLD AUTO: 64 %
PLATELET # BLD AUTO: 372 X10E3/UL (ref 150–450)
POTASSIUM SERPL-SCNC: 4.8 MMOL/L (ref 3.5–5.2)
PROT SERPL-MCNC: 7 G/DL (ref 6–8.5)
RBC # BLD AUTO: 4.92 X10E6/UL (ref 3.77–5.28)
SODIUM SERPL-SCNC: 139 MMOL/L (ref 134–144)
T3RU NFR SERPL: 29 % (ref 24–39)
T4 SERPL-MCNC: 15.5 UG/DL (ref 4.5–12)
TRIGL SERPL-MCNC: 133 MG/DL (ref 0–149)
TSH SERPL DL<=0.005 MIU/L-ACNC: 1.17 UIU/ML (ref 0.45–4.5)
VLDLC SERPL CALC-MCNC: 24 MG/DL (ref 5–40)
WBC # BLD AUTO: 9 X10E3/UL (ref 3.4–10.8)

## 2022-03-25 ENCOUNTER — OFFICE VISIT (OUTPATIENT)
Dept: FAMILY MEDICINE CLINIC | Facility: CLINIC | Age: 72
End: 2022-03-25

## 2022-03-25 VITALS
WEIGHT: 211 LBS | OXYGEN SATURATION: 96 % | DIASTOLIC BLOOD PRESSURE: 80 MMHG | RESPIRATION RATE: 14 BRPM | HEART RATE: 89 BPM | HEIGHT: 65 IN | SYSTOLIC BLOOD PRESSURE: 128 MMHG | BODY MASS INDEX: 35.16 KG/M2 | TEMPERATURE: 98.2 F

## 2022-03-25 DIAGNOSIS — E66.9 OBESITY (BMI 30-39.9): ICD-10-CM

## 2022-03-25 DIAGNOSIS — E78.00 HYPERCHOLESTEROLEMIA: ICD-10-CM

## 2022-03-25 DIAGNOSIS — E11.65 TYPE 2 DIABETES MELLITUS WITH HYPERGLYCEMIA, WITHOUT LONG-TERM CURRENT USE OF INSULIN: Primary | ICD-10-CM

## 2022-03-25 DIAGNOSIS — I10 ESSENTIAL HYPERTENSION: ICD-10-CM

## 2022-03-25 DIAGNOSIS — K21.00 GASTROESOPHAGEAL REFLUX DISEASE WITH ESOPHAGITIS: ICD-10-CM

## 2022-03-25 DIAGNOSIS — L40.9 PSORIASIS: ICD-10-CM

## 2022-03-25 DIAGNOSIS — E03.9 ACQUIRED HYPOTHYROIDISM: ICD-10-CM

## 2022-03-25 PROCEDURE — 99214 OFFICE O/P EST MOD 30 MIN: CPT | Performed by: PHYSICIAN ASSISTANT

## 2022-03-25 RX ORDER — CICLOPIROX 1 G/100ML
SHAMPOO TOPICAL
Qty: 120 ML | Refills: 2 | Status: SHIPPED | OUTPATIENT
Start: 2022-03-25 | End: 2022-06-27

## 2022-03-25 RX ORDER — ESOMEPRAZOLE MAGNESIUM 40 MG/1
40 CAPSULE, DELAYED RELEASE ORAL
Qty: 90 CAPSULE | Refills: 2 | Status: SHIPPED | OUTPATIENT
Start: 2022-03-25 | End: 2022-06-06

## 2022-03-25 NOTE — PROGRESS NOTES
"Chief Complaint  Hypothyroidism (management), Hypertension (management), Hyperlipidemia (management), and Diabetes (management)    Subjective          Hilaria Klein presents to [facility] MEDICAL [facility] PRIMARY CARE  History of Present Illness  Hilaria is a 71-year-old female who presents for type 2 diabetes, hypothyroidism, hypertension and hyperlipidemia management.  Hilaria has lost 17 pounds since last office visit in September 2021.  States she got sick earlier this year with upper respiratory infection.  Unsure if she had COVID.  States she had decreased appetite during that time.  She has been trying to keep off the weight by eating healthier by decreasing portion sizes as well.  Sleep has been normal.  She has been trying to be more active by walking.  She is currently drinking 16 ounces of coffee and 20 ounces of water daily.  Bowel movements have been daily without dark black tarry stools.  She denied any fevers, chills, upper respiratory symptoms, chest pain, shortness of air, wheezing, GI upset or swelling of ankles.  Has no complaints today.  Review of Systems   All other systems reviewed and are negative.    Objective   Vital Signs:   /80   Pulse 89   Temp 98.2 °F (36.8 °C)   Resp 14   Ht 165.1 cm (65\")   Wt 95.7 kg (211 lb)   SpO2 96%   BMI 35.11 kg/m²     Physical Exam  Vitals and nursing note reviewed.   Constitutional:       Appearance: Normal appearance. She is well-developed and well-groomed. She is obese.      Interventions: Face mask in place.   HENT:      Head: Normocephalic and atraumatic.   Neck:      Thyroid: No thyroid mass, thyromegaly or thyroid tenderness.      Vascular: No carotid bruit.      Trachea: Trachea and phonation normal. No tracheal tenderness.   Cardiovascular:      Rate and Rhythm: Normal rate and regular rhythm.      Pulses: Normal pulses.      Heart sounds: Normal heart sounds, S1 normal and S2 normal. No murmur heard.  Pulmonary:      Effort: " Pulmonary effort is normal.      Breath sounds: Normal breath sounds and air entry.   Abdominal:      General: Bowel sounds are normal.      Palpations: Abdomen is soft. There is no hepatomegaly.      Tenderness: There is no abdominal tenderness. There is no right CVA tenderness, left CVA tenderness, guarding or rebound. Negative signs include Byrd's sign, Rovsing's sign, McBurney's sign, psoas sign and obturator sign.   Musculoskeletal:      Cervical back: Neck supple.      Right lower leg: No edema.      Left lower leg: No edema.   Skin:     General: Skin is warm and dry.      Capillary Refill: Capillary refill takes less than 2 seconds.   Neurological:      Mental Status: She is alert and oriented to person, place, and time.   Psychiatric:         Attention and Perception: Attention and perception normal.         Mood and Affect: Mood and affect normal.         Speech: Speech normal.         Behavior: Behavior normal. Behavior is cooperative.         Thought Content: Thought content normal.         Cognition and Memory: Cognition and memory normal.         Judgment: Judgment normal.     I wore a facial mask, face shield, and gloves during this patient encounter.  Patient also wearing a surgical mask.  Hand hygiene performed before and after seeing the patient.     Result Review :     CMP    CMP 4/19/21 9/9/21 3/17/22   Glucose 111 (A) 110 (A) 105 (A)   BUN 14 15 10   Creatinine 0.84 0.82 0.78   eGFR Non  Am 67 69    eGFR African Am 81 84    Sodium 138 139 139   Potassium 4.7 4.4 4.8   Chloride 99 100 100   Calcium 9.9 9.3 9.4   Total Protein 6.7 6.6 7.0   Albumin 4.30 4.50 4.2   Globulin 2.4 2.1 2.8   Total Bilirubin 0.7 0.7 0.4   Alkaline Phosphatase 83 84 89   AST (SGOT) 27 36 (A) 28   ALT (SGPT) 36 (A) 53 (A) 36 (A)   (A) Abnormal value       Comments are available for some flowsheets but are not being displayed.           CBC w/diff    CBC w/Diff 4/19/21 9/9/21 3/17/22   WBC 7.26 6.90 9.0   RBC 4.79  4.59 4.92   Hemoglobin 14.4 13.8 14.3   Hematocrit 44.0 42.3 43.1   MCV 91.9 92.2 88   MCH 30.1 30.1 29.1   MCHC 32.7 32.6 33.2   RDW 13.3 12.6 13.0   Platelets 327 287 372   Neutrophil Rel % 56.7 61.5 64   Lymphocyte Rel % 26.4 24.3 23   Monocyte Rel % 8.3 9.4 8   Eosinophil Rel % 7.3 (A) 3.8 4   Basophil Rel % 0.7 0.6 1   (A) Abnormal value            Lipid Panel    Lipid Panel 4/19/21 9/9/21 3/17/22   Total Cholesterol 169 165 142   Triglycerides 145 123 133   HDL Cholesterol 47 39 (A) 43   VLDL Cholesterol 25 22 24   LDL Cholesterol  97 104 (A) 75   LDL/HDL Ratio 1.98 2.60 1.7   (A) Abnormal value       Comments are available for some flowsheets but are not being displayed.           TSH    TSH 9/9/21 11/24/21 3/17/22   TSH 5.860 (A) 4.400 1.170   (A) Abnormal value            Most Recent A1C    HGBA1C Most Recent 3/17/22   Hemoglobin A1C 6.2 (A)   (A) Abnormal value       Comments are available for some flowsheets but are not being displayed.           Microalbumin    Microalbumin 9/24/21   Microalbumin, Urine <3.0      Comments are available for some flowsheets but are not being displayed.                     Assessment and Plan    Diagnoses and all orders for this visit:    1. Type 2 diabetes mellitus with hyperglycemia, without long-term current use of insulin (HCC) (Primary)  -     Ambulatory Referral for Diabetic Eye Exam-Ophthalmology    2. Acquired hypothyroidism    3. Hypercholesterolemia    4. Essential hypertension    5. Obesity (BMI 30-39.9)    6. Gastroesophageal reflux disease with esophagitis  -     esomeprazole (nexIUM) 40 MG capsule; Take 1 capsule by mouth Every Morning Before Breakfast.  Dispense: 90 capsule; Refill: 2    7. Psoriasis  -     ciclopirox (LOPROX) 1 % shampoo; Use twice a week as directed  Dispense: 120 mL; Refill: 2    1.  Chronic and stable type 2 diabetes with hypercholesteremia: I have reviewed above blood work with her at office visit today.  I have given her encouragement  praise for weight loss and eating healthier.  Will return to office in 6 months for Medicare wellness exam.  I have given Hilaria diabetic eye form to take to [facility] for diabetic eye exam.  She is due in April 2022.  2.  Chronic and stable hypothyroidism: TSH above is in therapeutic range.  We will continue current medication at home as directed.  3.  Chronic and stable hypertension: Blood pressure was in therapeutic range at 122/78.  She will continue her current medication at home as directed.  4.  Chronic and stable obesity: She has lost 17 pounds since last office visit in September 2021 by eating healthier and decreasing portion sizes.  Have given her encouragement and praise.  Will reevaluate weight at next office visit in 6 months.  5.  Chronic and stable GERD: Doing well with the Nexium medication.  I have sent a refill to pharmacy.  6.  Chronic psoriasis: I have refilled her shampoo to use topically twice weekly to pharmacy.  She will keep her follow-up appointments with dermatologist as directed.    Follow Up   Return in about 6 months (around 9/25/2022), or labs prior-DM, for Medicare Wellness.  Patient was given instructions and counseling regarding her condition or for health maintenance advice. Please see specific information pulled into the AVS if appropriate.     [facility]  [address] [place] [id number]  Dept: [phone]  Dept Fax: [phone]  Loc: [phone]  Loc Fax: [phone]

## 2022-05-04 DIAGNOSIS — I10 ESSENTIAL HYPERTENSION: ICD-10-CM

## 2022-05-04 RX ORDER — METOPROLOL SUCCINATE 25 MG/1
25 TABLET, EXTENDED RELEASE ORAL DAILY
Qty: 90 TABLET | Refills: 0 | Status: SHIPPED | OUTPATIENT
Start: 2022-05-04 | End: 2022-08-06

## 2022-05-14 DIAGNOSIS — I10 ESSENTIAL HYPERTENSION: ICD-10-CM

## 2022-05-14 RX ORDER — LOSARTAN POTASSIUM 100 MG/1
TABLET ORAL
Qty: 90 TABLET | Refills: 0 | Status: SHIPPED | OUTPATIENT
Start: 2022-05-14 | End: 2022-08-14

## 2022-05-24 DIAGNOSIS — E03.9 ACQUIRED HYPOTHYROIDISM: ICD-10-CM

## 2022-05-24 RX ORDER — LEVOTHYROXINE SODIUM 0.15 MG/1
150 TABLET ORAL DAILY
Qty: 90 TABLET | Refills: 1 | Status: SHIPPED | OUTPATIENT
Start: 2022-05-24 | End: 2022-10-07

## 2022-06-05 DIAGNOSIS — K21.00 GASTROESOPHAGEAL REFLUX DISEASE WITH ESOPHAGITIS: ICD-10-CM

## 2022-06-06 RX ORDER — ESOMEPRAZOLE MAGNESIUM 40 MG/1
40 CAPSULE, DELAYED RELEASE ORAL
Qty: 90 CAPSULE | Refills: 2 | Status: SHIPPED | OUTPATIENT
Start: 2022-06-06 | End: 2022-06-08

## 2022-06-07 DIAGNOSIS — K21.00 GASTROESOPHAGEAL REFLUX DISEASE WITH ESOPHAGITIS: ICD-10-CM

## 2022-06-08 RX ORDER — ESOMEPRAZOLE MAGNESIUM 40 MG/1
40 CAPSULE, DELAYED RELEASE ORAL
Qty: 90 CAPSULE | Refills: 2 | Status: SHIPPED | OUTPATIENT
Start: 2022-06-08 | End: 2022-10-07 | Stop reason: CLARIF

## 2022-06-25 DIAGNOSIS — L40.9 PSORIASIS: ICD-10-CM

## 2022-06-27 RX ORDER — CICLOPIROX 1 G/100ML
SHAMPOO TOPICAL
Qty: 120 ML | Refills: 2 | Status: SHIPPED | OUTPATIENT
Start: 2022-06-27

## 2022-08-06 DIAGNOSIS — I10 ESSENTIAL HYPERTENSION: ICD-10-CM

## 2022-08-06 RX ORDER — METOPROLOL SUCCINATE 25 MG/1
25 TABLET, EXTENDED RELEASE ORAL DAILY
Qty: 90 TABLET | Refills: 0 | Status: SHIPPED | OUTPATIENT
Start: 2022-08-06 | End: 2022-11-04

## 2022-08-13 DIAGNOSIS — I10 ESSENTIAL HYPERTENSION: ICD-10-CM

## 2022-08-13 DIAGNOSIS — E78.00 HYPERCHOLESTEROLEMIA: ICD-10-CM

## 2022-08-14 RX ORDER — SIMVASTATIN 40 MG
TABLET ORAL
Qty: 90 TABLET | Refills: 2 | Status: SHIPPED | OUTPATIENT
Start: 2022-08-14

## 2022-08-14 RX ORDER — LOSARTAN POTASSIUM 100 MG/1
TABLET ORAL
Qty: 90 TABLET | Refills: 0 | Status: SHIPPED | OUTPATIENT
Start: 2022-08-14 | End: 2022-11-10

## 2022-09-23 DIAGNOSIS — E03.9 ACQUIRED HYPOTHYROIDISM: ICD-10-CM

## 2022-09-23 DIAGNOSIS — E78.00 HYPERCHOLESTEROLEMIA: ICD-10-CM

## 2022-09-23 DIAGNOSIS — E11.65 TYPE 2 DIABETES MELLITUS WITH HYPERGLYCEMIA, WITHOUT LONG-TERM CURRENT USE OF INSULIN: Primary | ICD-10-CM

## 2022-10-01 LAB
ALBUMIN SERPL-MCNC: 4.5 G/DL (ref 3.5–5.2)
ALBUMIN/GLOB SERPL: 2.3 G/DL
ALP SERPL-CCNC: 92 U/L (ref 39–117)
ALT SERPL-CCNC: 26 U/L (ref 1–33)
AST SERPL-CCNC: 22 U/L (ref 1–32)
BASOPHILS # BLD AUTO: 0.04 10*3/MM3 (ref 0–0.2)
BASOPHILS NFR BLD AUTO: 0.6 % (ref 0–1.5)
BILIRUB SERPL-MCNC: 0.7 MG/DL (ref 0–1.2)
BUN SERPL-MCNC: 13 MG/DL (ref 8–23)
BUN/CREAT SERPL: 12 (ref 7–25)
CALCIUM SERPL-MCNC: 9.6 MG/DL (ref 8.6–10.5)
CHLORIDE SERPL-SCNC: 99 MMOL/L (ref 98–107)
CHOLEST SERPL-MCNC: 170 MG/DL (ref 0–200)
CO2 SERPL-SCNC: 27.5 MMOL/L (ref 22–29)
CREAT SERPL-MCNC: 1.08 MG/DL (ref 0.57–1)
EGFRCR SERPLBLD CKD-EPI 2021: 55.4 ML/MIN/1.73
EOSINOPHIL # BLD AUTO: 0.31 10*3/MM3 (ref 0–0.4)
EOSINOPHIL NFR BLD AUTO: 4.6 % (ref 0.3–6.2)
ERYTHROCYTE [DISTWIDTH] IN BLOOD BY AUTOMATED COUNT: 12.5 % (ref 12.3–15.4)
FT4I SERPL CALC-MCNC: 3.9 (ref 1.2–4.9)
GLOBULIN SER CALC-MCNC: 2 GM/DL
GLUCOSE SERPL-MCNC: 106 MG/DL (ref 65–99)
HBA1C MFR BLD: 5.9 % (ref 4.8–5.6)
HCT VFR BLD AUTO: 43.2 % (ref 34–46.6)
HDLC SERPL-MCNC: 49 MG/DL (ref 40–60)
HGB BLD-MCNC: 13.7 G/DL (ref 12–15.9)
IMM GRANULOCYTES # BLD AUTO: 0.03 10*3/MM3 (ref 0–0.05)
IMM GRANULOCYTES NFR BLD AUTO: 0.4 % (ref 0–0.5)
LDLC SERPL CALC-MCNC: 95 MG/DL (ref 0–100)
LDLC/HDLC SERPL: 1.85 {RATIO}
LYMPHOCYTES # BLD AUTO: 2.16 10*3/MM3 (ref 0.7–3.1)
LYMPHOCYTES NFR BLD AUTO: 31.8 % (ref 19.6–45.3)
MCH RBC QN AUTO: 29 PG (ref 26.6–33)
MCHC RBC AUTO-ENTMCNC: 31.7 G/DL (ref 31.5–35.7)
MCV RBC AUTO: 91.3 FL (ref 79–97)
MONOCYTES # BLD AUTO: 0.59 10*3/MM3 (ref 0.1–0.9)
MONOCYTES NFR BLD AUTO: 8.7 % (ref 5–12)
NEUTROPHILS # BLD AUTO: 3.66 10*3/MM3 (ref 1.7–7)
NEUTROPHILS NFR BLD AUTO: 53.9 % (ref 42.7–76)
NRBC BLD AUTO-RTO: 0 /100 WBC (ref 0–0.2)
PLATELET # BLD AUTO: 343 10*3/MM3 (ref 140–450)
POTASSIUM SERPL-SCNC: 4.5 MMOL/L (ref 3.5–5.2)
PROT SERPL-MCNC: 6.5 G/DL (ref 6–8.5)
RBC # BLD AUTO: 4.73 10*6/MM3 (ref 3.77–5.28)
SODIUM SERPL-SCNC: 139 MMOL/L (ref 136–145)
T3RU NFR SERPL: 27 % (ref 24–39)
T4 SERPL-MCNC: 14.5 UG/DL (ref 4.5–12)
TRIGL SERPL-MCNC: 152 MG/DL (ref 0–150)
TSH SERPL DL<=0.005 MIU/L-ACNC: 2.28 UIU/ML (ref 0.45–4.5)
VLDLC SERPL CALC-MCNC: 26 MG/DL (ref 5–40)
WBC # BLD AUTO: 6.79 10*3/MM3 (ref 3.4–10.8)

## 2022-10-04 ENCOUNTER — OFFICE VISIT (OUTPATIENT)
Dept: ORTHOPEDIC SURGERY | Facility: CLINIC | Age: 72
End: 2022-10-04

## 2022-10-04 ENCOUNTER — PATIENT ROUNDING (BHMG ONLY) (OUTPATIENT)
Dept: ORTHOPEDIC SURGERY | Facility: CLINIC | Age: 72
End: 2022-10-04

## 2022-10-04 VITALS
HEIGHT: 65 IN | SYSTOLIC BLOOD PRESSURE: 164 MMHG | HEART RATE: 76 BPM | BODY MASS INDEX: 35.16 KG/M2 | WEIGHT: 211 LBS | DIASTOLIC BLOOD PRESSURE: 81 MMHG

## 2022-10-04 DIAGNOSIS — S43.004A SHOULDER DISLOCATION, RIGHT, INITIAL ENCOUNTER: ICD-10-CM

## 2022-10-04 DIAGNOSIS — M19.011 PRIMARY OSTEOARTHRITIS, RIGHT SHOULDER: ICD-10-CM

## 2022-10-04 DIAGNOSIS — M19.011 ARTHRITIS OF RIGHT ACROMIOCLAVICULAR JOINT: ICD-10-CM

## 2022-10-04 DIAGNOSIS — M75.101 NONTRAUMATIC TEAR OF RIGHT ROTATOR CUFF, UNSPECIFIED TEAR EXTENT: ICD-10-CM

## 2022-10-04 DIAGNOSIS — M25.511 ACUTE PAIN OF RIGHT SHOULDER: Primary | ICD-10-CM

## 2022-10-04 PROCEDURE — 99204 OFFICE O/P NEW MOD 45 MIN: CPT | Performed by: NURSE PRACTITIONER

## 2022-10-04 PROCEDURE — 73030 X-RAY EXAM OF SHOULDER: CPT | Performed by: NURSE PRACTITIONER

## 2022-10-04 NOTE — PROGRESS NOTES
Subjective:     Patient ID: Hilaria Klein is a 72 y.o. female.    Chief Complaint:  Right shoulder dislocation, 9/10/2022  History of Present Illness  Hilaria Klein 72-year-old female presents to clinic for evaluation of her right shoulder.  Approximately 1 month ago she was injured dislocation right shoulder occurred on September 10, 2022 while she was on vacation after she fell.  X-ray images were completed at local urgent care shoulder was reduced placed in sling presents for evaluation.  Rates discomfort a 1 out of a 10 has began taking the sling off at night and has started some active range of motion.  When she first reached above her head with forward flexion noted some pain but over the last few days has noted significant symptom improvement.  Denies that the shoulder is giving out on her denies any prior dislocation of the shoulder.  Denies any prior surgical intervention of the right shoulder.  She is right-hand dominant.  Denies any presence of numbness or tingling at the right upper extremity.  Not currently taking any anti-inflammatory medications for symptom relief.  Denies any other concerns that she has at this time.       Social History     Occupational History   • Not on file   Tobacco Use   • Smoking status: Never Smoker   • Smokeless tobacco: Never Used   Vaping Use   • Vaping Use: Never used   Substance and Sexual Activity   • Alcohol use: No   • Drug use: No   • Sexual activity: Defer      Past Medical History:   Diagnosis Date   • Colon polyp    • GERD (gastroesophageal reflux disease)    • Goiter    • Skin cancer     nose     Past Surgical History:   Procedure Laterality Date   • APPENDECTOMY     • COLONOSCOPY N/A 10/30/2017    Procedure: COLONOSCOPY; POLYPECTOMY;  Surgeon: Effie Harry MD;  Location: [facility];  Service:    • COLONOSCOPY W/ BIOPSIES AND POLYPECTOMY     • ENDOSCOPY N/A 10/30/2017    Procedure: ESOPHAGOGASTRODUODENOSCOPY WITH BIOPSY;  Surgeon: Effie Harry MD;   "Location: [facility] OR;  Service:    • KNEE ARTHROPLASTY Left        Family History   Problem Relation Age of Onset   • Hypertension Other    • Deep vein thrombosis Other    • Breast cancer Neg Hx                Objective:  Physical Exam    Vital signs reviewed.   General: No acute distress.  Eyes: conjunctiva clear; pupils equally round and reactive  ENT: external ears and nose atraumatic; oropharynx clear  CV: no peripheral edema  Resp: normal respiratory effort  Skin: no rashes or wounds; normal turgor  Psych: mood and affect appropriate; recent and remote memory intact    Vitals:    10/04/22 0914   BP: 164/81   Pulse: 76   Weight: 95.7 kg (211 lb)   Height: 165.1 cm (65\")         10/04/22  0914   Weight: 95.7 kg (211 lb)     Body mass index is 35.11 kg/m².      Right Shoulder Exam     Tenderness   The patient is experiencing tenderness in the acromion.    Range of Motion   External rotation: 50   Forward flexion: 160   Internal rotation 0 degrees: L5     Muscle Strength   Internal rotation: 4/5   External rotation: 4/5   Supraspinatus: 4/5   Subscapularis: 4/5   Biceps: 4/5     Tests   Martinez test: positive  Cross arm: negative  Impingement: positive  Drop arm: negative    Other   Erythema: absent  Sensation: normal  Pulse: present    Comments:  Mildly positive empty can  positive Dayton's  Mildly positive Speed's  negative bear hug exam                   Imaging:  Right Shoulder X-Ray  Indication: Pain  AP Internal and External Rotation views    Findings:  Evidence of Hill-Sachs deformity no acute fracture dislocation, calcific tendinitis, suspect rotator cuff tear appears to be chronic, AC joint arthritis, glenohumeral arthritis  No bony lesion  Normal soft tissues  Normal joint spaces  prior studies were available for comparison.    Three-view x-ray imaging previously completed outside facility shows anterior dislocation Hill-Sachs deformity noted no imaging postreduction we will repeat x-ray images today in " clinic    Assessment:        1. Acute pain of right shoulder    2. Shoulder dislocation, right, initial encounter    3. Primary osteoarthritis, right shoulder    4. Arthritis of right acromioclavicular joint    5. Nontraumatic tear of right rotator cuff, unspecified tear extent           Plan:  1. Discussed plan of care with patient.  Discussed treatment options including MR arthrogram to evaluate shoulder for surgical planning including reverse total shoulder arthroplasty versus glenohumeral injection with steroid.  At this time does not wish to proceed with surgical intervention nor does she wish to proceed with injection.  Encourage patient to continue with active range of motion and avoid internal activities and avoid external motions to avoid recurrent dislocation.  Discussed formal physical therapy versus home strengthening exercises.  At this time she wishes to proceed with home strengthening exercises.  Discussed we will plan to see her back in clinic in 4 weeks if she is not improving can discuss steroid injection.  If she continues to improve and regarding strength can return to all previously tolerated activities.  Discussed that she is able to discontinue sling and is recommended at this time.  We will gladly see her back in clinic as needed.  All questions answered.    Orders:  Orders Placed This Encounter   Procedures   • XR Shoulder 2+ View Right     No orders of the defined types were placed in this encounter.          I ordered and reviewed the LILIANA today.     Dragon Dictation utilized.

## 2022-10-04 NOTE — PROGRESS NOTES
October 4, 2022    Hello, may I speak with Hilaria Klein?    My name is ANANT      I am  with [facility]  [address]  [place] [address] [phone].    Before we get started may I verify your date of birth? 1950    I am calling to officially welcome you to our practice and ask about your recent visit. Is this a good time to talk? YES    Tell me about your visit with us. What things went well? EVERYONE EVERYTHING WAS GOOD       We're always looking for ways to make our patients' experiences even better. Do you have recommendations on ways we may improve?  NO    Overall were you satisfied with your first visit to our practice? YES       I appreciate you taking the time to speak with me today. Is there anything else I can do for you? NO      Thank you, and have a great day.

## 2022-10-05 PROBLEM — M75.101 NONTRAUMATIC TEAR OF RIGHT ROTATOR CUFF: Status: ACTIVE | Noted: 2022-10-05

## 2022-10-05 PROBLEM — S43.004A SHOULDER DISLOCATION, RIGHT, INITIAL ENCOUNTER: Status: ACTIVE | Noted: 2022-10-05

## 2022-10-05 PROBLEM — M19.011 ARTHRITIS OF RIGHT ACROMIOCLAVICULAR JOINT: Status: ACTIVE | Noted: 2022-10-05

## 2022-10-07 ENCOUNTER — OFFICE VISIT (OUTPATIENT)
Dept: FAMILY MEDICINE CLINIC | Facility: CLINIC | Age: 72
End: 2022-10-07

## 2022-10-07 VITALS
WEIGHT: 206 LBS | HEART RATE: 87 BPM | DIASTOLIC BLOOD PRESSURE: 94 MMHG | SYSTOLIC BLOOD PRESSURE: 142 MMHG | HEIGHT: 65 IN | BODY MASS INDEX: 34.32 KG/M2 | RESPIRATION RATE: 15 BRPM | TEMPERATURE: 98.2 F | OXYGEN SATURATION: 99 %

## 2022-10-07 DIAGNOSIS — K21.00 GASTROESOPHAGEAL REFLUX DISEASE WITH ESOPHAGITIS WITHOUT HEMORRHAGE: ICD-10-CM

## 2022-10-07 DIAGNOSIS — E78.00 HYPERCHOLESTEROLEMIA: ICD-10-CM

## 2022-10-07 DIAGNOSIS — I10 ESSENTIAL HYPERTENSION: ICD-10-CM

## 2022-10-07 DIAGNOSIS — Z23 NEED FOR INFLUENZA VACCINATION: ICD-10-CM

## 2022-10-07 DIAGNOSIS — E11.65 TYPE 2 DIABETES MELLITUS WITH HYPERGLYCEMIA, WITHOUT LONG-TERM CURRENT USE OF INSULIN: ICD-10-CM

## 2022-10-07 DIAGNOSIS — E03.9 ACQUIRED HYPOTHYROIDISM: ICD-10-CM

## 2022-10-07 DIAGNOSIS — Z00.00 MEDICARE ANNUAL WELLNESS VISIT, SUBSEQUENT: Primary | ICD-10-CM

## 2022-10-07 PROCEDURE — 1160F RVW MEDS BY RX/DR IN RCRD: CPT | Performed by: PHYSICIAN ASSISTANT

## 2022-10-07 PROCEDURE — G0439 PPPS, SUBSEQ VISIT: HCPCS | Performed by: PHYSICIAN ASSISTANT

## 2022-10-07 PROCEDURE — 90662 IIV NO PRSV INCREASED AG IM: CPT | Performed by: PHYSICIAN ASSISTANT

## 2022-10-07 PROCEDURE — G0008 ADMIN INFLUENZA VIRUS VAC: HCPCS | Performed by: PHYSICIAN ASSISTANT

## 2022-10-07 PROCEDURE — 1170F FXNL STATUS ASSESSED: CPT | Performed by: PHYSICIAN ASSISTANT

## 2022-10-07 RX ORDER — LEVOTHYROXINE SODIUM 0.15 MG/1
150 TABLET ORAL DAILY
Qty: 90 TABLET | Refills: 2 | Status: SHIPPED | OUTPATIENT
Start: 2022-10-07 | End: 2022-11-29

## 2022-10-07 RX ORDER — OMEPRAZOLE 40 MG/1
40 CAPSULE, DELAYED RELEASE ORAL DAILY
Qty: 90 CAPSULE | Refills: 1 | Status: SHIPPED | OUTPATIENT
Start: 2022-10-07 | End: 2023-03-29

## 2022-10-07 NOTE — PROGRESS NOTES
The ABCs of the Annual Wellness Visit  Subsequent Medicare Wellness Visit    Chief Complaint   Patient presents with   • Medicare Wellness-subsequent   • Diabetes     Management   • Hypothyroidism     Management   • Hypertension     Management   • Hyperlipidemia     Management      Subjective    History of Present Illness:  Hilaria Klein is a 72 y.o. female who presents for a Subsequent Medicare Wellness Visit, type 2 diabetes, hypothyroidism, hypertension and hyperlipidemia management.  She has lost 5 pounds since March 2022 office visit.  Overall she is feeling good today.  Sleep has been normal.  Last eye exam was in April 2022.  Bowel movements have been daily without dark black tarry stools.  Diet has not been as healthy.  She has been trying to walk for exercise.  States she is while on vacation in [facility] around September 10, 2022, she fell and dislocated her right shoulder.  She was seen at the urgent care in [place] and recently at orthopedist here in [place].  She is doing well.  [facility] she did not realize there was a sunken floor at the house they have rented in [place] and she tripped and fell.  Has been using over-the-counter ibuprofen periodically for pain and discomfort.  Has follow-up appointment with orthopedist in 4 weeks.  Has been compliant with her medications.  [facility] she took her blood pressure medication 2 hours before office visit.  Denied any current fevers, chills, upper respiratory symptoms, chest pain, shortness of air, cough, wheezing, abdominal pain, GI upset, or swelling of ankles.      The following portions of the patient's history were reviewed and   updated as appropriate:  Past Medical History:   Diagnosis Date   • Colon polyp    • GERD (gastroesophageal reflux disease)    • Goiter    • Skin cancer     nose      .    Compared to one year ago, the patient feels her physical   health is the same.    Compared to one year ago, the patient feels her mental   health is the  same.    Recent Hospitalizations:  She was not admitted to the hospital during the last year.       Current Medical Providers:  Patient Care Team:  Tennille Lebron PA-C as PCP - General    Outpatient Medications Prior to Visit   Medication Sig Dispense Refill   • Cholecalciferol (VITAMIN D3) 5000 units capsule capsule Take 5,000 Units by mouth Daily.     • ciclopirox (LOPROX) 1 % shampoo APPLY TOPICALLY 2 TIMES A WEEK AS DIRECTED 120 mL 2   • losartan (COZAAR) 100 MG tablet TAKE 1 TABLET BY MOUTH DAILY 90 tablet 0   • metoprolol succinate XL (TOPROL-XL) 25 MG 24 hr tablet TAKE 1 TABLET BY MOUTH DAILY 90 tablet 0   • simvastatin (ZOCOR) 40 MG tablet TAKE 1 TABLET BY MOUTH EVERY NIGHT 90 tablet 2   • esomeprazole (nexIUM) 40 MG capsule TAKE 1 CAPSULE BY MOUTH EVERY MORNING BEFORE BREAKFAST 90 capsule 2   • levothyroxine (SYNTHROID, LEVOTHROID) 150 MCG tablet TAKE 1 TABLET BY MOUTH DAILY 90 tablet 1     No facility-administered medications prior to visit.       No opioid medication identified on active medication list. I have reviewed chart for other potential  high risk medication/s and harmful drug interactions in the elderly.          Aspirin is not on active medication list.  Aspirin use is not indicated based on review of current medical condition/s. Risk of harm outweighs potential benefits.  .    Patient Active Problem List   Diagnosis   • Gastroesophageal reflux disease with esophagitis   • Hypercholesterolemia   • Acquired hypothyroidism   • Pruritus of vagina   • Vitamin D deficiency   • Medicare annual wellness visit, subsequent   • Pap smear, as part of routine gynecological examination   • Neoplasm of uncertain behavior   • Irregular heart beat   • Dizziness   • Screening mammogram, encounter for   • Encounter for screening colonoscopy   • Post-menopausal   • Encounter for screening for malignant neoplasm of colon   • Hyperplastic polyp of sigmoid colon   • Need for immunization against influenza   •  "Essential hypertension   • Osteopenia of neck of left femur   • Morbidly obese (HCC)   • Need for pneumococcal vaccination   • Chronic cough   • Type 2 diabetes mellitus with hyperglycemia, without long-term current use of insulin ([facility])   • Nontraumatic tear of right rotator cuff   • Primary osteoarthritis, right shoulder   • Shoulder dislocation, right, initial encounter     Advance Care Planning  Advance Directive is not on file.  ACP discussion was held with the patient during this visit. Patient does not have an advance directive, declines further assistance.    Review of Systems   Constitutional: Negative.    HENT: Negative.    Eyes: Negative.    Respiratory: Negative.    Cardiovascular: Negative.    Gastrointestinal: Negative.    Endocrine: Negative.    Genitourinary: Negative.    Musculoskeletal:        Left shoulder dislocated in September 2022   Allergic/Immunologic: Negative.    Neurological: Negative.    Hematological: Negative.    Psychiatric/Behavioral: Negative.  Negative for sleep disturbance and suicidal ideas.   All other systems reviewed and are negative.       Objective    Vitals:    10/07/22 0938   BP: 142/94   Pulse: 87   Resp: 15   Temp: 98.2 °F (36.8 °C)   SpO2: 99%   Weight: 93.4 kg (206 lb)   Height: 165.1 cm (65\")     Estimated body mass index is 34.28 kg/m² as calculated from the following:    Height as of this encounter: 165.1 cm (65\").    Weight as of this encounter: 93.4 kg (206 lb).    BMI is >= 30 and <35. (Class 1 Obesity). The following options were offered after discussion;: weight loss educational material (shared in after visit summary) and exercise counseling/recommendations      Does the patient have evidence of cognitive impairment? No    Physical Exam  Vitals and nursing note reviewed.   Constitutional:       Appearance: Normal appearance. She is well-developed and well-groomed. She is obese.      Interventions: Face mask in place.   HENT:      Head: Normocephalic and " atraumatic.      Jaw: There is normal jaw occlusion.      Right Ear: Hearing, tympanic membrane, ear canal and external ear normal.      Left Ear: Hearing, tympanic membrane, ear canal and external ear normal.      Nose: Nose normal.      Right Sinus: No maxillary sinus tenderness or frontal sinus tenderness.      Left Sinus: No maxillary sinus tenderness or frontal sinus tenderness.      Mouth/Throat:      Lips: Pink.      Mouth: Mucous membranes are moist.      Dentition: Normal dentition.      Tongue: No lesions.      Pharynx: Oropharynx is clear.      Tonsils: No tonsillar exudate.   Eyes:      General: Lids are normal.      Conjunctiva/sclera: Conjunctivae normal.      Pupils: Pupils are equal, round, and reactive to light.   Neck:      Thyroid: No thyromegaly.      Vascular: No carotid bruit.      Trachea: Trachea and phonation normal. No tracheal tenderness.   Cardiovascular:      Rate and Rhythm: Normal rate and regular rhythm.      Pulses: Normal pulses.      Heart sounds: Normal heart sounds, S1 normal and S2 normal. No murmur heard.  Pulmonary:      Effort: Pulmonary effort is normal.      Breath sounds: Normal breath sounds and air entry.   Abdominal:      General: Bowel sounds are normal.      Palpations: Abdomen is soft.      Tenderness: There is no abdominal tenderness. There is no right CVA tenderness, left CVA tenderness, guarding or rebound. Negative signs include Byrd's sign, Rovsing's sign, McBurney's sign, psoas sign and obturator sign.   Musculoskeletal:      Cervical back: Neck supple.      Right lower leg: No edema.      Left lower leg: No edema.   Lymphadenopathy:      Cervical: No cervical adenopathy.      Right cervical: No superficial, deep or posterior cervical adenopathy.     Left cervical: No superficial, deep or posterior cervical adenopathy.   Skin:     General: Skin is warm and dry.      Capillary Refill: Capillary refill takes less than 2 seconds.   Neurological:      Mental  Status: She is alert and oriented to person, place, and time.      Deep Tendon Reflexes:      Reflex Scores:       Patellar reflexes are 2+ on the right side and 2+ on the left side.  Psychiatric:         Attention and Perception: Attention and perception normal.         Mood and Affect: Mood and affect normal.         Speech: Speech normal.         Behavior: Behavior is cooperative.         Thought Content: Thought content normal.         Cognition and Memory: Cognition and memory normal.         Judgment: Judgment normal.     I wore a facial mask during this patient encounter.  Patient also wearing a surgical mask.  Hand hygiene performed before and after seeing the patient.    Lab Results   Component Value Date    CHLPL 170 09/30/2022    TRIG 152 (H) 09/30/2022    HDL 49 09/30/2022    LDL 95 09/30/2022    VLDL 26 09/30/2022    HGBA1C 5.90 (H) 09/30/2022            HEALTH RISK ASSESSMENT    Smoking Status:  Social History     Tobacco Use   Smoking Status Never Smoker   Smokeless Tobacco Never Used     Alcohol Consumption:  Social History     Substance and Sexual Activity   Alcohol Use No     Fall Risk Screen:    STEADI Fall Risk Assessment was completed, and patient is at HIGH risk for falls. Assessment completed on:10/7/2022    Depression Screening:  PHQ-2/PHQ-9 Depression Screening 10/7/2022   Retired PHQ-9 Total Score -   Retired Total Score -   Little Interest or Pleasure in Doing Things 0-->not at all   Feeling Down, Depressed or Hopeless 0-->not at all   PHQ-9: Brief Depression Severity Measure Score 0       Health Habits and Functional and Cognitive Screening:  Functional & Cognitive Status 10/7/2022   Do you have difficulty preparing food and eating? No   Do you have difficulty bathing yourself, getting dressed or grooming yourself? No   Do you have difficulty using the toilet? No   Do you have difficulty moving around from place to place? No   Do you have trouble with steps or getting out of a bed or a  chair? No   Current Diet Unhealthy Diet   Dental Exam Up to date   Eye Exam Up to date   Exercise (times per week) 5 times per week   Current Exercises Include Walking   Current Exercise Activities Include -   Do you need help using the phone?  No   Are you deaf or do you have serious difficulty hearing?  No   Do you need help with transportation? No   Do you need help shopping? No   Do you need help preparing meals?  No   Do you need help with housework?  No   Do you need help with laundry? No   Do you need help taking your medications? No   Do you need help managing money? No   Do you ever drive or ride in a car without wearing a seat belt? No   Have you felt unusual stress, anger or loneliness in the last month? No   Who do you live with? Alone   If you need help, do you have trouble finding someone available to you? No   Have you been bothered in the last four weeks by sexual problems? No   Do you have difficulty concentrating, remembering or making decisions? No       Age-appropriate Screening Schedule:  Refer to the list below for future screening recommendations based on patient's age, sex and/or medical conditions. Orders for these recommended tests are listed in the plan section. The patient has been provided with a written plan.    Health Maintenance   Topic Date Due   • DIABETIC FOOT EXAM  09/24/2022   • URINE MICROALBUMIN  09/24/2022   • TDAP/TD VACCINES (2 - Tdap) 10/16/2023 (Originally 10/14/2008)   • ZOSTER VACCINE (1 of 2) 10/18/2023 (Originally 3/29/2002)   • HEMOGLOBIN A1C  03/30/2023   • LIPID PANEL  09/30/2023   • DIABETIC EYE EXAM  10/05/2023   • MAMMOGRAM  10/14/2023   • DXA SCAN  10/14/2023   • PAP SMEAR  09/24/2024   • INFLUENZA VACCINE  Completed              Assessment & Plan   CMS Preventative Services Quick Reference  Risk Factors Identified During Encounter  Cardiovascular Disease  The above risks/problems have been discussed with the patient.  Follow up actions/plans if indicated are  seen below in the Assessment/Plan Section.  Pertinent information has been shared with the patient in the After Visit Summary.    Diagnoses and all orders for this visit:    1. Medicare annual wellness visit, subsequent (Primary)    2. Type 2 diabetes mellitus with hyperglycemia, without long-term current use of insulin (HCC)  -     Microalbumin / Creatinine Urine Ratio - Urine, Clean Catch    3. Acquired hypothyroidism  -     levothyroxine (SYNTHROID, LEVOTHROID) 150 MCG tablet; Take 1 tablet by mouth Daily.  Dispense: 90 tablet; Refill: 2    4. Hypercholesterolemia    5. Essential hypertension    6. Need for influenza vaccination  -     Fluzone High-Dose 65+yrs    7. Gastroesophageal reflux disease with esophagitis without hemorrhage  -     omeprazole (priLOSEC) 40 MG capsule; Take 1 capsule by mouth Daily.  Dispense: 90 capsule; Refill: 1    1.  Annual Medicare wellness exam with hypertension: Blood pressure was in therapeutic range.  Took blood pressure medicine approximately 2 hours before office visit.  We will continue medication at home as directed.  I have asked her to keep a blood pressure log and notify office of any high or low readings.  Return to office in 6 months.  2.  Chronic and stable type 2 diabetes with hypercholesterolemia: Have reviewed above blood work with her today.  Diabetes is currently controlled with diet.  I will check urine microalbumin today.  She will be notified of test results.  Return to office in 6 months for fasting lab work.  3.  Chronic and stable hypothyroidism: TSH was in therapeutic range.  Continue current thyroid medication at home.  4.  Need for high-dose influenza vaccination: Hilaria has given written consent to receive high-dose flu shot today.  5.  Chronic and stable GERD: Insurance will not cover Protonix.  Will change to generic Prilosec.  Prescription was sent to pharmacy.    Follow Up:   Return in about 6 months (around 4/7/2023), or Type 2 Diabetes-labs prior.      An After Visit Summary and PPPS were made available to the patient.            Patient Counseling:  --Nutrition: Stressed importance of moderation in sodium/caffeine intake, saturated fat and cholesterol.  Discussed caloric balance, sufficient intake of fresh fruits, vegetables, fiber,   calcium, iron.  --Discussed the new recommendation against daily use of baby aspirin for primary prevention in low risk patients.  --Exercise: Stressed the importance of regular exercise by incorporating into daily routine.    --Substance Abuse: Discussed cessation/primary prevention of tobacco, alcohol, or other drug use; driving or other dangerous activities under the influence.    --Dental health: Discussed importance of regular tooth brushing, flossing, and dental visits.  -- Suggested having eyes and vision checked if needed or past due.  --Immunizations reviewed.  --Discussed benefits of screening colonoscopy.    [facility]  [address] [place] [id number]  Dept: [phone]  Dept Fax: [phone]  Loc: [phone]  Loc Fax: [phone]

## 2022-11-02 ENCOUNTER — OFFICE VISIT (OUTPATIENT)
Dept: ORTHOPEDIC SURGERY | Facility: CLINIC | Age: 72
End: 2022-11-02

## 2022-11-02 VITALS — HEIGHT: 65 IN | WEIGHT: 206 LBS | BODY MASS INDEX: 34.32 KG/M2

## 2022-11-02 DIAGNOSIS — S43.004A SHOULDER DISLOCATION, RIGHT, INITIAL ENCOUNTER: Primary | ICD-10-CM

## 2022-11-02 DIAGNOSIS — M19.011 PRIMARY OSTEOARTHRITIS, RIGHT SHOULDER: ICD-10-CM

## 2022-11-02 PROCEDURE — 99213 OFFICE O/P EST LOW 20 MIN: CPT | Performed by: NURSE PRACTITIONER

## 2022-11-02 NOTE — PROGRESS NOTES
Subjective:     Patient ID: Hilaria Klein is a 72 y.o. female.    Chief Complaint:  Right shoulder dislocation, 9/10/2022  History of Present Illness  Hilaria Klein returns to clinic for follow-up of her right upper extremity.  She is continued with active range of motion strengthening at the right upper extremity.  Denies that she is experiencing any discomfort with any motion denies any recent injury to the right shoulder.  Shoulder dislocation occurred September 10, 2022 while she was on vacation she has remained out of sling has returned to almost all previously tolerated activities is again continued with active range of motion and strengthening of the right upper extremity is provided last visit.  Denies any presence of numbness or tingling at the right upper extremity.  Has continued to do extremely well.  Denies any other concerns present.    Social History     Occupational History   • Not on file   Tobacco Use   • Smoking status: Never   • Smokeless tobacco: Never   Vaping Use   • Vaping Use: Never used   Substance and Sexual Activity   • Alcohol use: No   • Drug use: No   • Sexual activity: Defer      Past Medical History:   Diagnosis Date   • Colon polyp    • GERD (gastroesophageal reflux disease)    • Goiter    • Skin cancer     nose     Past Surgical History:   Procedure Laterality Date   • APPENDECTOMY     • COLONOSCOPY N/A 10/30/2017    Procedure: COLONOSCOPY; POLYPECTOMY;  Surgeon: Effie Harry MD;  Location: [facility];  Service:    • COLONOSCOPY W/ BIOPSIES AND POLYPECTOMY     • ENDOSCOPY N/A 10/30/2017    Procedure: ESOPHAGOGASTRODUODENOSCOPY WITH BIOPSY;  Surgeon: Effie Harry MD;  Location: [facility] OR;  Service:    • KNEE ARTHROPLASTY Left        Family History   Problem Relation Age of Onset   • Hypertension Other    • Deep vein thrombosis Other    • Breast cancer Neg Hx                Objective:  Physical Exam    General: No acute distress.  Eyes: conjunctiva clear; pupils equally round  "and reactive  ENT: external ears and nose atraumatic; oropharynx clear  CV: no peripheral edema  Resp: normal respiratory effort  Skin: no rashes or wounds; normal turgor  Psych: mood and affect appropriate; recent and remote memory intact    Vitals:    11/02/22 0821   Weight: 93.4 kg (206 lb)   Height: 165.1 cm (65\")         11/02/22  0821   Weight: 93.4 kg (206 lb)     Body mass index is 34.28 kg/m².      Right Shoulder Exam     Tenderness   The patient is experiencing no tenderness.    Range of Motion   External rotation: 70   Forward flexion: 170   Internal rotation 0 degrees: L5     Muscle Strength   Internal rotation: 4/5   External rotation: 4/5   Supraspinatus: 4/5   Subscapularis: 4/5   Biceps: 4/5     Tests   Martinez test: positive  Cross arm: negative  Impingement: positive  Drop arm: negative    Other   Erythema: absent  Sensation: normal  Pulse: present    Comments:  Negative empty can  negative [place]'s  negative Speed's  negative bear hug exam           Assessment:        1. Shoulder dislocation, right, initial encounter    2. Primary osteoarthritis, right shoulder           Plan:  1. Long discussion with patient regarding treatment options.  Again she has continued to heal extremely well.  She has continued with strengthening exercises including exercises that were provided to her previously.  Discussed to continue home strengthening program.  At this time since she is completely asymptomatic and continues to do very well we will release her.  Discussed we will plan to follow-up with her if needed if pain returns or any other injury occurs.  Gladly see her back in clinic with any questions or concerns that she has.  All questions answered.  Orders:  No orders of the defined types were placed in this encounter.    No orders of the defined types were placed in this encounter.          Dragon Dictation utilized.    "

## 2022-11-04 DIAGNOSIS — I10 ESSENTIAL HYPERTENSION: ICD-10-CM

## 2022-11-04 RX ORDER — METOPROLOL SUCCINATE 25 MG/1
25 TABLET, EXTENDED RELEASE ORAL DAILY
Qty: 90 TABLET | Refills: 0 | Status: SHIPPED | OUTPATIENT
Start: 2022-11-04 | End: 2023-02-06

## 2022-11-10 DIAGNOSIS — I10 ESSENTIAL HYPERTENSION: ICD-10-CM

## 2022-11-10 RX ORDER — LOSARTAN POTASSIUM 100 MG/1
TABLET ORAL
Qty: 90 TABLET | Refills: 2 | Status: SHIPPED | OUTPATIENT
Start: 2022-11-10

## 2022-11-29 DIAGNOSIS — E03.9 ACQUIRED HYPOTHYROIDISM: ICD-10-CM

## 2022-11-29 RX ORDER — LEVOTHYROXINE SODIUM 0.15 MG/1
150 TABLET ORAL DAILY
Qty: 90 TABLET | Refills: 2 | Status: SHIPPED | OUTPATIENT
Start: 2022-11-29

## 2023-02-04 DIAGNOSIS — I10 ESSENTIAL HYPERTENSION: ICD-10-CM

## 2023-02-06 RX ORDER — METOPROLOL SUCCINATE 25 MG/1
25 TABLET, EXTENDED RELEASE ORAL DAILY
Qty: 90 TABLET | Refills: 0 | Status: SHIPPED | OUTPATIENT
Start: 2023-02-06

## 2023-03-28 DIAGNOSIS — K21.00 GASTROESOPHAGEAL REFLUX DISEASE WITH ESOPHAGITIS WITHOUT HEMORRHAGE: ICD-10-CM

## 2023-03-29 RX ORDER — OMEPRAZOLE 40 MG/1
40 CAPSULE, DELAYED RELEASE ORAL DAILY
Qty: 90 CAPSULE | Refills: 1 | Status: SHIPPED | OUTPATIENT
Start: 2023-03-29

## 2023-03-31 DIAGNOSIS — E11.65 TYPE 2 DIABETES MELLITUS WITH HYPERGLYCEMIA, WITHOUT LONG-TERM CURRENT USE OF INSULIN: ICD-10-CM

## 2023-03-31 DIAGNOSIS — E03.9 ACQUIRED HYPOTHYROIDISM: ICD-10-CM

## 2023-03-31 DIAGNOSIS — I10 ESSENTIAL HYPERTENSION: ICD-10-CM

## 2023-03-31 DIAGNOSIS — E78.00 HYPERCHOLESTEROLEMIA: Primary | ICD-10-CM

## 2023-04-10 ENCOUNTER — OFFICE VISIT (OUTPATIENT)
Dept: FAMILY MEDICINE CLINIC | Facility: CLINIC | Age: 73
End: 2023-04-10
Payer: MEDICARE

## 2023-04-10 VITALS
RESPIRATION RATE: 16 BRPM | OXYGEN SATURATION: 98 % | WEIGHT: 208 LBS | SYSTOLIC BLOOD PRESSURE: 134 MMHG | HEIGHT: 65 IN | HEART RATE: 106 BPM | TEMPERATURE: 98.4 F | DIASTOLIC BLOOD PRESSURE: 80 MMHG | BODY MASS INDEX: 34.66 KG/M2

## 2023-04-10 DIAGNOSIS — E78.00 HYPERCHOLESTEROLEMIA: ICD-10-CM

## 2023-04-10 DIAGNOSIS — R00.2 HEART PALPITATIONS: ICD-10-CM

## 2023-04-10 DIAGNOSIS — E66.9 OBESITY (BMI 30-39.9): ICD-10-CM

## 2023-04-10 DIAGNOSIS — E11.65 TYPE 2 DIABETES MELLITUS WITH HYPERGLYCEMIA, WITHOUT LONG-TERM CURRENT USE OF INSULIN: Primary | ICD-10-CM

## 2023-04-10 DIAGNOSIS — E03.9 ACQUIRED HYPOTHYROIDISM: ICD-10-CM

## 2023-04-10 DIAGNOSIS — I10 ESSENTIAL HYPERTENSION: ICD-10-CM

## 2023-04-10 PROBLEM — Z23 NEED FOR IMMUNIZATION AGAINST INFLUENZA: Status: RESOLVED | Noted: 2019-09-19 | Resolved: 2023-04-10

## 2023-04-10 PROBLEM — R42 DIZZINESS: Status: RESOLVED | Noted: 2017-02-16 | Resolved: 2023-04-10

## 2023-04-10 RX ORDER — LEVOTHYROXINE SODIUM 137 UG/1
137 TABLET ORAL DAILY
Qty: 90 TABLET | Refills: 0 | Status: SHIPPED | OUTPATIENT
Start: 2023-04-10

## 2023-04-10 NOTE — PROGRESS NOTES
"Chief Complaint  Diabetes (management), Hypertension (management), Hyperlipidemia (management), and Hypothyroidism (management)    Subjective          Hilaria Klein presents to [facility]  History of Present Illness  Hilaria is 73 jez old female who presents type 2 diabetes, hypertension, hyperlipidemia and hypothyroidism management.  She has gained 2 pounds since October 7, 2022.  Doing well with her medications.  Appetite has been normal.  Sleep has been normal as well.  Bowel movements have been daily without dark black tarry stools.  Last diabetic eye exam was in April 2022.  She has an occasional heart palpitations.  Last episode was a few weeks ago.  States only last for seconds and then does not return.  Denied any chest pain, shortness of air, dizziness, wheezing or GI upset with the heart palpitations.  Caffeine intake is 12 ounces of coffee daily.  Denied any current chest pain, shortness of air, cough, wheezing, dizziness, headache, GI upset or swelling of ankles.     Objective   Vital Signs:   /80 (BP Location: Left arm, Patient Position: Sitting, Cuff Size: Adult)   Pulse 106   Temp 98.4 °F (36.9 °C)   Resp 16   Ht 165.1 cm (65\")   Wt 94.3 kg (208 lb)   SpO2 98%   BMI 34.61 kg/m²     Physical Exam  Vitals and nursing note reviewed.   Constitutional:       Appearance: Normal appearance. She is well-developed and well-groomed.      Interventions: Face mask in place.   HENT:      Head: Normocephalic and atraumatic.   Neck:      Vascular: No carotid bruit.      Trachea: Trachea and phonation normal. No tracheal tenderness.   Cardiovascular:      Rate and Rhythm: Normal rate and regular rhythm.      Pulses: Normal pulses.      Heart sounds: Normal heart sounds, S1 normal and S2 normal. No murmur heard.  Pulmonary:      Effort: Pulmonary effort is normal.      Breath sounds: Normal breath sounds and air entry.   Abdominal:      General: Bowel sounds are normal. "      Palpations: Abdomen is soft. There is no hepatomegaly.      Tenderness: There is no abdominal tenderness.   Musculoskeletal:      Cervical back: Neck supple.   Feet:      Right foot:      Protective Sensation: 10 sites tested. 10 sites sensed.      Skin integrity: Skin integrity normal.      Toenail Condition: Right toenails are normal.      Left foot:      Protective Sensation: 10 sites tested. 10 sites sensed.      Skin integrity: Skin integrity normal.      Toenail Condition: Left toenails are normal.   Skin:     General: Skin is warm and dry.      Capillary Refill: Capillary refill takes less than 2 seconds.   Neurological:      Mental Status: She is alert and oriented to person, place, and time.   Psychiatric:         Attention and Perception: Attention and perception normal.         Mood and Affect: Mood and affect normal.         Speech: Speech normal.         Behavior: Behavior normal. Behavior is cooperative.         Thought Content: Thought content normal.         Cognition and Memory: Cognition and memory normal.         Judgment: Judgment normal.        Result Review :     CMP    CMP 9/30/22 4/3/23   Glucose 106 (A) 109 (A)   BUN 13 11   Creatinine 1.08 (A) 0.92   Sodium 139 139   Potassium 4.5 4.4   Chloride 99 101   Calcium 9.6 9.7   Total Protein 6.5 6.8   Albumin 4.50 4.2   Globulin 2.0 2.6   Total Bilirubin 0.7 0.6   Alkaline Phosphatase 92 96   AST (SGOT) 22 22   ALT (SGPT) 26 30   BUN/Creatinine Ratio 12.0 12.0   (A) Abnormal value            CBC    CBC 9/30/22 4/3/23   WBC 6.79 7.90   RBC 4.73 4.66   Hemoglobin 13.7 13.5   Hematocrit 43.2 40.8   MCV 91.3 87.6   MCH 29.0 29.0   MCHC 31.7 33.1   RDW 12.5 12.9   Platelets 343 360           Lipid Panel    Lipid Panel 9/30/22 4/3/23   Total Cholesterol 170 154   Triglycerides 152 (A) 110   HDL Cholesterol 49 43   VLDL Cholesterol 26 20   LDL Cholesterol  95 91   LDL/HDL Ratio 1.85 2.07   (A) Abnormal value       Comments are available for some  flowsheets but are not being displayed.           TSH    TSH 9/30/22 4/3/23   TSH 2.280 0.658           Most Recent A1C    HGBA1C Most Recent 4/3/23   Hemoglobin A1C 5.90 (A)   (A) Abnormal value       Comments are available for some flowsheets but are not being displayed.           Microalbumin    Microalbumin 4/3/23   Microalbumin, Urine 9.2           Advance Care Planning   ACP discussion was held with the patient during this visit. Patient does not have an advance directive, information provided.          ECG 12 Lead    Date/Time: 4/10/2023 11:12 AM  Performed by: Tennille Lebron PA-C  Authorized by: Tennille Lebron PA-C   Comparison: compared with previous ECG from 9/19/2019  Similar to previous ECG  Rhythm: sinus rhythm  Rate: normal  BPM: 69  Conduction: conduction normal  ST Segments: ST segments normal  T Waves: T waves normal  QRS axis: normal    Clinical impression: normal ECG  Comments: Indication:  New heart palpitations  LA int:  169 ms  QRS dur:  99 ms  QT/QTc:  378/398 ms         SCANNED EKG (09/19/2019)        Assessment and Plan    Diagnoses and all orders for this visit:    1. Type 2 diabetes mellitus with hyperglycemia, without long-term current use of insulin (Primary)  -     Ambulatory Referral for Diabetic Eye Exam-Ophthalmology    2. Essential hypertension    3. Hypercholesterolemia    4. Acquired hypothyroidism  -     levothyroxine (SYNTHROID, LEVOTHROID) 137 MCG tablet; Take 1 tablet by mouth Daily.  Dispense: 90 tablet; Refill: 0  -     TSH; Future    5. Obesity (BMI 30-39.9)    6. Heart palpitations  -     ECG 12 Lead    1.  Chronic and stable type 2 diabetes with hypercholesteremia: I have reviewed above blood work with her at office visit today.  Diabetes is currently controlled with diet.  Follow-up in 6 months with Medicare wellness exam with fasting lab work.  She will keep appointment for her diabetic eye exam in April 2023.  2.  Chronic and stable hypertension: I have  rechecked blood pressure and got 128/80 in left arm.  Continue current blood pressure medication at home as directed.  3.  Chronic and stable hypothyroidism with occasional heart palpitations: Have reviewed above blood work.  TSH was low normal.  We will decrease her dose to 137 mcg daily to see if this will help improve with heart palpitations.  In office EKG showed normal sinus rhythm.  We will monitor.  Follow-up with TSH in 6-8 weeks.  4.  Chronic and stable obesity: Stressed the importance of losing weight by dieting and exercising.  We will reevaluate her weight at office visit in 6 months.    I spent 25 minutes caring for Hilaria on this date of service. This time includes time spent by me in the following activities:preparing for the visit, reviewing tests, obtaining and/or reviewing a separately obtained history, performing a medically appropriate examination and/or evaluation , counseling and educating the patient/family/caregiver, ordering medications, tests, or procedures, documenting information in the medical record and independently interpreting results and communicating that information with the patient/family/caregiver  Follow Up   Return in about 2 months (around 6/10/2023), or TSH only and Medicare wllness in 6 months labs prior.  Patient was given instructions and counseling regarding her condition or for health maintenance advice. Please see specific information pulled into the AVS if appropriate.     NICKIE Lagos [facility]  [address] [place] [id number]  Dept: [phone]  Dept Fax: [phone]  Loc: [phone]  Loc Fax: [phone]

## 2023-04-24 DIAGNOSIS — I10 ESSENTIAL HYPERTENSION: ICD-10-CM

## 2023-04-24 RX ORDER — METOPROLOL SUCCINATE 25 MG/1
25 TABLET, EXTENDED RELEASE ORAL DAILY
Qty: 90 TABLET | Refills: 2 | Status: SHIPPED | OUTPATIENT
Start: 2023-04-24

## 2023-06-13 DIAGNOSIS — E03.9 ACQUIRED HYPOTHYROIDISM: Primary | ICD-10-CM

## 2023-06-13 RX ORDER — LEVOTHYROXINE SODIUM 0.12 MG/1
125 TABLET ORAL DAILY
Qty: 30 TABLET | Refills: 3 | Status: SHIPPED | OUTPATIENT
Start: 2023-06-13

## 2023-07-23 DIAGNOSIS — I10 ESSENTIAL HYPERTENSION: ICD-10-CM

## 2023-07-24 RX ORDER — LOSARTAN POTASSIUM 100 MG/1
TABLET ORAL
Qty: 90 TABLET | Refills: 2 | Status: SHIPPED | OUTPATIENT
Start: 2023-07-24

## 2023-07-26 DIAGNOSIS — E78.00 HYPERCHOLESTEROLEMIA: ICD-10-CM

## 2023-07-26 RX ORDER — SIMVASTATIN 40 MG
TABLET ORAL
Qty: 90 TABLET | Refills: 0 | Status: SHIPPED | OUTPATIENT
Start: 2023-07-26

## 2023-09-18 ENCOUNTER — TELEPHONE (OUTPATIENT)
Dept: FAMILY MEDICINE CLINIC | Facility: CLINIC | Age: 73
End: 2023-09-18

## 2023-09-18 NOTE — TELEPHONE ENCOUNTER
Hub staff attempted to follow warm transfer process and was unsuccessful     Caller: Hilaria Klein S    Relationship to patient: Self    Best call back number: [phone]     Patient is needing: PATIENT CALLING TO SCHEDULE THYROID LAB SHE STATED THAT ANY EXCEPT WEDNESDAY WILL WORK FOR HER

## 2023-10-03 DIAGNOSIS — E11.65 TYPE 2 DIABETES MELLITUS WITH HYPERGLYCEMIA, WITHOUT LONG-TERM CURRENT USE OF INSULIN: ICD-10-CM

## 2023-10-03 DIAGNOSIS — E78.00 HYPERCHOLESTEROLEMIA: Primary | ICD-10-CM

## 2023-10-03 DIAGNOSIS — I10 ESSENTIAL HYPERTENSION: ICD-10-CM

## 2023-10-04 DIAGNOSIS — E11.65 TYPE 2 DIABETES MELLITUS WITH HYPERGLYCEMIA, WITHOUT LONG-TERM CURRENT USE OF INSULIN: ICD-10-CM

## 2023-10-04 DIAGNOSIS — I10 ESSENTIAL HYPERTENSION: ICD-10-CM

## 2023-10-04 DIAGNOSIS — E78.00 HYPERCHOLESTEROLEMIA: ICD-10-CM

## 2023-10-06 LAB
ALBUMIN SERPL-MCNC: 4.3 G/DL (ref 3.5–5.2)
ALBUMIN/GLOB SERPL: 1.8 G/DL
ALP SERPL-CCNC: 109 U/L (ref 39–117)
ALT SERPL-CCNC: 50 U/L (ref 1–33)
AST SERPL-CCNC: 34 U/L (ref 1–32)
BASOPHILS # BLD AUTO: 0.06 10*3/MM3 (ref 0–0.2)
BASOPHILS NFR BLD AUTO: 0.8 % (ref 0–1.5)
BILIRUB SERPL-MCNC: 0.5 MG/DL (ref 0–1.2)
BUN SERPL-MCNC: 11 MG/DL (ref 8–23)
BUN/CREAT SERPL: 14.1 (ref 7–25)
CALCIUM SERPL-MCNC: 9.8 MG/DL (ref 8.6–10.5)
CHLORIDE SERPL-SCNC: 101 MMOL/L (ref 98–107)
CHOLEST SERPL-MCNC: 240 MG/DL (ref 0–200)
CO2 SERPL-SCNC: 26.7 MMOL/L (ref 22–29)
CREAT SERPL-MCNC: 0.78 MG/DL (ref 0.57–1)
EGFRCR SERPLBLD CKD-EPI 2021: 81.3 ML/MIN/1.73
EOSINOPHIL # BLD AUTO: 0.35 10*3/MM3 (ref 0–0.4)
EOSINOPHIL NFR BLD AUTO: 4.5 % (ref 0.3–6.2)
ERYTHROCYTE [DISTWIDTH] IN BLOOD BY AUTOMATED COUNT: 13 % (ref 12.3–15.4)
GLOBULIN SER CALC-MCNC: 2.4 GM/DL
GLUCOSE SERPL-MCNC: 108 MG/DL (ref 65–99)
HBA1C MFR BLD: 6 % (ref 4.8–5.6)
HCT VFR BLD AUTO: 41.4 % (ref 34–46.6)
HDLC SERPL-MCNC: 43 MG/DL (ref 40–60)
HGB BLD-MCNC: 13.6 G/DL (ref 12–15.9)
IMM GRANULOCYTES # BLD AUTO: 0.04 10*3/MM3 (ref 0–0.05)
IMM GRANULOCYTES NFR BLD AUTO: 0.5 % (ref 0–0.5)
LDLC SERPL CALC-MCNC: 171 MG/DL (ref 0–100)
LDLC/HDLC SERPL: 3.93 {RATIO}
LYMPHOCYTES # BLD AUTO: 2.23 10*3/MM3 (ref 0.7–3.1)
LYMPHOCYTES NFR BLD AUTO: 28.8 % (ref 19.6–45.3)
MCH RBC QN AUTO: 28.8 PG (ref 26.6–33)
MCHC RBC AUTO-ENTMCNC: 32.9 G/DL (ref 31.5–35.7)
MCV RBC AUTO: 87.5 FL (ref 79–97)
MONOCYTES # BLD AUTO: 0.58 10*3/MM3 (ref 0.1–0.9)
MONOCYTES NFR BLD AUTO: 7.5 % (ref 5–12)
NEUTROPHILS # BLD AUTO: 4.48 10*3/MM3 (ref 1.7–7)
NEUTROPHILS NFR BLD AUTO: 57.9 % (ref 42.7–76)
NRBC BLD AUTO-RTO: 0.1 /100 WBC (ref 0–0.2)
PLATELET # BLD AUTO: 369 10*3/MM3 (ref 140–450)
POTASSIUM SERPL-SCNC: 4.6 MMOL/L (ref 3.5–5.2)
PROT SERPL-MCNC: 6.7 G/DL (ref 6–8.5)
RBC # BLD AUTO: 4.73 10*6/MM3 (ref 3.77–5.28)
SODIUM SERPL-SCNC: 138 MMOL/L (ref 136–145)
TRIGL SERPL-MCNC: 140 MG/DL (ref 0–150)
VLDLC SERPL CALC-MCNC: 26 MG/DL (ref 5–40)
WBC # BLD AUTO: 7.74 10*3/MM3 (ref 3.4–10.8)

## 2023-10-16 ENCOUNTER — OFFICE VISIT (OUTPATIENT)
Dept: FAMILY MEDICINE CLINIC | Facility: CLINIC | Age: 73
End: 2023-10-16
Payer: MEDICARE

## 2023-10-16 VITALS
HEART RATE: 64 BPM | BODY MASS INDEX: 34.32 KG/M2 | HEIGHT: 65 IN | SYSTOLIC BLOOD PRESSURE: 122 MMHG | WEIGHT: 206 LBS | OXYGEN SATURATION: 99 % | RESPIRATION RATE: 14 BRPM | DIASTOLIC BLOOD PRESSURE: 78 MMHG | TEMPERATURE: 97.7 F

## 2023-10-16 DIAGNOSIS — E03.9 ACQUIRED HYPOTHYROIDISM: ICD-10-CM

## 2023-10-16 DIAGNOSIS — E11.65 TYPE 2 DIABETES MELLITUS WITH HYPERGLYCEMIA, WITHOUT LONG-TERM CURRENT USE OF INSULIN: ICD-10-CM

## 2023-10-16 DIAGNOSIS — Z12.31 ENCOUNTER FOR SCREENING MAMMOGRAM FOR MALIGNANT NEOPLASM OF BREAST: ICD-10-CM

## 2023-10-16 DIAGNOSIS — Z78.0 POSTMENOPAUSAL: ICD-10-CM

## 2023-10-16 DIAGNOSIS — I10 ESSENTIAL HYPERTENSION: ICD-10-CM

## 2023-10-16 DIAGNOSIS — Z00.00 MEDICARE ANNUAL WELLNESS VISIT, SUBSEQUENT: Primary | ICD-10-CM

## 2023-10-16 DIAGNOSIS — E78.00 HYPERCHOLESTEROLEMIA: ICD-10-CM

## 2023-10-16 DIAGNOSIS — Z23 NEED FOR INFLUENZA VACCINATION: ICD-10-CM

## 2023-10-16 DIAGNOSIS — R53.82 CHRONIC FATIGUE: ICD-10-CM

## 2023-10-16 DIAGNOSIS — E66.09 CLASS 1 OBESITY DUE TO EXCESS CALORIES WITH SERIOUS COMORBIDITY AND BODY MASS INDEX (BMI) OF 34.0 TO 34.9 IN ADULT: ICD-10-CM

## 2023-10-16 DIAGNOSIS — Z12.31 SCREENING MAMMOGRAM FOR BREAST CANCER: ICD-10-CM

## 2023-10-16 PROBLEM — E66.811 CLASS 1 OBESITY DUE TO EXCESS CALORIES WITH SERIOUS COMORBIDITY AND BODY MASS INDEX (BMI) OF 34.0 TO 34.9 IN ADULT: Status: ACTIVE | Noted: 2019-10-17

## 2023-10-16 PROCEDURE — 90662 IIV NO PRSV INCREASED AG IM: CPT | Performed by: PHYSICIAN ASSISTANT

## 2023-10-16 PROCEDURE — G0008 ADMIN INFLUENZA VIRUS VAC: HCPCS | Performed by: PHYSICIAN ASSISTANT

## 2023-10-16 PROCEDURE — 3074F SYST BP LT 130 MM HG: CPT | Performed by: PHYSICIAN ASSISTANT

## 2023-10-16 PROCEDURE — 1170F FXNL STATUS ASSESSED: CPT | Performed by: PHYSICIAN ASSISTANT

## 2023-10-16 PROCEDURE — 3078F DIAST BP <80 MM HG: CPT | Performed by: PHYSICIAN ASSISTANT

## 2023-10-16 PROCEDURE — 99214 OFFICE O/P EST MOD 30 MIN: CPT | Performed by: PHYSICIAN ASSISTANT

## 2023-10-16 PROCEDURE — G0439 PPPS, SUBSEQ VISIT: HCPCS | Performed by: PHYSICIAN ASSISTANT

## 2023-10-16 PROCEDURE — 3044F HG A1C LEVEL LT 7.0%: CPT | Performed by: PHYSICIAN ASSISTANT

## 2023-10-16 RX ORDER — PRAVASTATIN SODIUM 20 MG
20 TABLET ORAL DAILY
Qty: 90 TABLET | Refills: 0 | Status: SHIPPED | OUTPATIENT
Start: 2023-10-16

## 2023-10-16 NOTE — PROGRESS NOTES
The ABCs of the Annual Wellness Visit  Subsequent Medicare Wellness Visit    Chief Complaint   Patient presents with    Medicare Wellness-subsequent    Dizziness     Comes and goes, off balance     Fatigue    Hypertension     Management    Hypothyroidism     Management    Diabetes     Management       Subjective    Hilaria Klein is a 73 y.o. female who presents for a Subsequent Medicare Wellness Visit, type 2 diabetes, hypertension and hypothyroidism management.  Hilaria has lost 2 pounds since April 10, 2023 office visit.  States she has been compliant with medication.  She had stopped the cholesterol medication due to cost.  The last time she went to pick it up it was around $90 for the prescription.  States she cannot afford that.  That movements have been daily without dark black tarry stools.  Recently had eye exam and cataract surgery this summer.  Diet has been slightly normal.  She has been trying to make healthier choices.  Sleep has been good.  Does not exercise.  Denied any current fevers, chills, upper respiratory symptoms, chest pain, shortness of breath, wheezing, abdominal pain, GI upset or swelling of ankles.  Would like flu shot.    The following portions of the patient's history were reviewed and   updated as appropriate: She  has a past medical history of Colon polyp, GERD (gastroesophageal reflux disease), Goiter, and Skin cancer.  She does not have any pertinent problems on file.  She  has a past surgical history that includes Appendectomy; Knee Arthroplasty (Left); Colonoscopy w/ biopsies and polypectomy; Esophagogastroduodenoscopy (N/A, 10/30/2017); and Colonoscopy (N/A, 10/30/2017).  Her family history includes Deep vein thrombosis in an other family member; Hypertension in an other family member.  She  reports that she has never smoked. She has never used smokeless tobacco. She reports that she does not drink alcohol and does not use drugs.  Current Outpatient Medications   Medication Sig  Dispense Refill    Cholecalciferol (VITAMIN D3) 5000 units capsule capsule Take 1 capsule by mouth Daily.      ciclopirox (LOPROX) 1 % shampoo APPLY TOPICALLY 2 TIMES A WEEK AS DIRECTED 120 mL 2    levothyroxine (Synthroid) 125 MCG tablet Take 1 tablet by mouth Daily. 30 tablet 3    losartan (COZAAR) 100 MG tablet TAKE 1 TABLET BY MOUTH DAILY 90 tablet 2    metoprolol succinate XL (TOPROL-XL) 25 MG 24 hr tablet TAKE 1 TABLET BY MOUTH DAILY 90 tablet 2    omeprazole (priLOSEC) 40 MG capsule TAKE 1 CAPSULE BY MOUTH DAILY 90 capsule 1    pravastatin (Pravachol) 20 MG tablet Take 1 tablet by mouth Daily. 90 tablet 0     No current facility-administered medications for this visit.     Current Outpatient Medications on File Prior to Visit   Medication Sig    Cholecalciferol (VITAMIN D3) 5000 units capsule capsule Take 1 capsule by mouth Daily.    ciclopirox (LOPROX) 1 % shampoo APPLY TOPICALLY 2 TIMES A WEEK AS DIRECTED    levothyroxine (Synthroid) 125 MCG tablet Take 1 tablet by mouth Daily.    losartan (COZAAR) 100 MG tablet TAKE 1 TABLET BY MOUTH DAILY    metoprolol succinate XL (TOPROL-XL) 25 MG 24 hr tablet TAKE 1 TABLET BY MOUTH DAILY    omeprazole (priLOSEC) 40 MG capsule TAKE 1 CAPSULE BY MOUTH DAILY    [DISCONTINUED] simvastatin (ZOCOR) 40 MG tablet TAKE 1 TABLET BY MOUTH EVERY NIGHT (Patient not taking: Reported on 10/16/2023)     No current facility-administered medications on file prior to visit.     She is allergic to lisinopril, sulfa antibiotics, and penicillins..    Compared to one year ago, the patient feels her physical   health is worse.    Compared to one year ago, the patient feels her mental   health is the same.    Recent Hospitalizations:  She was not admitted to the hospital during the last year.       Current Medical Providers:  Patient Care Team:  Tennille Lebron PA-C as PCP - General    Outpatient Medications Prior to Visit   Medication Sig Dispense Refill    Cholecalciferol (VITAMIN D3)  5000 units capsule capsule Take 1 capsule by mouth Daily.      ciclopirox (LOPROX) 1 % shampoo APPLY TOPICALLY 2 TIMES A WEEK AS DIRECTED 120 mL 2    levothyroxine (Synthroid) 125 MCG tablet Take 1 tablet by mouth Daily. 30 tablet 3    losartan (COZAAR) 100 MG tablet TAKE 1 TABLET BY MOUTH DAILY 90 tablet 2    metoprolol succinate XL (TOPROL-XL) 25 MG 24 hr tablet TAKE 1 TABLET BY MOUTH DAILY 90 tablet 2    omeprazole (priLOSEC) 40 MG capsule TAKE 1 CAPSULE BY MOUTH DAILY 90 capsule 1    simvastatin (ZOCOR) 40 MG tablet TAKE 1 TABLET BY MOUTH EVERY NIGHT (Patient not taking: Reported on 10/16/2023) 90 tablet 0     No facility-administered medications prior to visit.       No opioid medication identified on active medication list. I have reviewed chart for other potential  high risk medication/s and harmful drug interactions in the elderly.        Aspirin is not on active medication list.  Aspirin use is not indicated based on review of current medical condition/s. Risk of harm outweighs potential benefits.  .    Patient Active Problem List   Diagnosis    Gastroesophageal reflux disease with esophagitis    Hypercholesterolemia    Acquired hypothyroidism    Pruritus of vagina    Vitamin D deficiency    Medicare annual wellness visit, subsequent    Pap smear, as part of routine gynecological examination    Neoplasm of uncertain behavior    Irregular heart beat    Screening mammogram, encounter for    Encounter for screening colonoscopy    Post-menopausal    Encounter for screening for malignant neoplasm of colon    Hyperplastic polyp of sigmoid colon    Essential hypertension    Osteopenia of neck of left femur    Class 1 obesity due to excess calories with serious comorbidity and body mass index (BMI) of 34.0 to 34.9 in adult    Chronic cough    Type 2 diabetes mellitus with hyperglycemia, without long-term current use of insulin    Nontraumatic tear of right rotator cuff    Primary osteoarthritis, right shoulder     "Shoulder dislocation, right, initial encounter     Advance Care Planning   Advance Care Planning     Advance Directive is not on file.  ACP discussion was held with the patient during this visit. Patient does not have an advance directive, declines further assistance.     Objective    Vitals:    10/16/23 1334   BP: 122/78   BP Location: Left arm   Patient Position: Sitting   Cuff Size: Large Adult   Pulse: 64   Resp: 14   Temp: 97.7 °F (36.5 °C)   SpO2: 99%   Weight: 93.4 kg (206 lb)   Height: 165.1 cm (65\")     Estimated body mass index is 34.28 kg/m² as calculated from the following:    Height as of this encounter: 165.1 cm (65\").    Weight as of this encounter: 93.4 kg (206 lb).           Does the patient have evidence of cognitive impairment? No    Lab Results   Component Value Date    CHLPL 240 (H) 10/05/2023    TRIG 140 10/05/2023    HDL 43 10/05/2023     (H) 10/05/2023    VLDL 26 10/05/2023    HGBA1C 6.00 (H) 10/05/2023        HEALTH RISK ASSESSMENT    Smoking Status:  Social History     Tobacco Use   Smoking Status Never   Smokeless Tobacco Never     Alcohol Consumption:  Social History     Substance and Sexual Activity   Alcohol Use No     Fall Risk Screen:    CANDIADI Fall Risk Assessment was completed, and patient is at LOW risk for falls.Assessment completed on:10/16/2023    Depression Screening:      10/16/2023     1:42 PM   PHQ-2/PHQ-9 Depression Screening   Little Interest or Pleasure in Doing Things 0-->not at all   Feeling Down, Depressed or Hopeless 0-->not at all   PHQ-9: Brief Depression Severity Measure Score 0       Health Habits and Functional and Cognitive Screening:      10/16/2023     1:41 PM   Functional & Cognitive Status   Do you have difficulty preparing food and eating? No   Do you have difficulty bathing yourself, getting dressed or grooming yourself? No   Do you have difficulty using the toilet? No   Do you have difficulty moving around from place to place? No   Do you have " trouble with steps or getting out of a bed or a chair? No   Current Diet Unhealthy Diet   Dental Exam Up to date   Eye Exam Up to date   Exercise (times per week) 2 times per week   Current Exercises Include Walking   Do you need help using the phone?  No   Are you deaf or do you have serious difficulty hearing?  No   Do you need help to go to places out of walking distance? No   Do you need help shopping? No   Do you need help preparing meals?  No   Do you need help with housework?  No   Do you need help with laundry? No   Do you need help taking your medications? No   Do you need help managing money? No   Do you ever drive or ride in a car without wearing a seat belt? No   Have you felt unusual stress, anger or loneliness in the last month? No   Who do you live with? Alone   If you need help, do you have trouble finding someone available to you? No   Have you been bothered in the last four weeks by sexual problems? No   Do you have difficulty concentrating, remembering or making decisions? No       Age-appropriate Screening Schedule:  Refer to the list below for future screening recommendations based on patient's age, sex and/or medical conditions. Orders for these recommended tests are listed in the plan section. The patient has been provided with a written plan.    Health Maintenance   Topic Date Due    DIABETIC FOOT EXAM  09/24/2022    ANNUAL WELLNESS VISIT  10/07/2023    MAMMOGRAM  10/14/2023    DXA SCAN  10/14/2023    COVID-19 Vaccine (1) 10/16/2023 (Originally 1950)    TDAP/TD VACCINES (2 - Tdap) 10/16/2023 (Originally 10/14/2008)    ZOSTER VACCINE (1 of 2) 10/18/2023 (Originally 3/29/2002)    DIABETIC EYE EXAM  12/04/2023 (Originally 10/5/2023)    URINE MICROALBUMIN  04/03/2024    HEMOGLOBIN A1C  04/05/2024    PAP SMEAR  09/24/2024    LIPID PANEL  10/05/2024    BMI FOLLOWUP  10/16/2024    COLORECTAL CANCER SCREENING  10/30/2027    HEPATITIS C SCREENING  Completed    INFLUENZA VACCINE  Completed     "Pneumococcal Vaccine 65+  Completed                  CMS Preventative Services Quick Reference  Risk Factors Identified During Encounter  Immunizations Discussed/Encouraged: Influenza  The above risks/problems have been discussed with the patient.  Pertinent information has been shared with the patient in the After Visit Summary.  An After Visit Summary and PPPS were made available to the patient.    Follow Up:   Next Medicare Wellness visit to be scheduled in 1 year.       Additional E&M Note during same encounter follows:  Patient has multiple medical problems which are significant and separately identifiable that require additional work above and beyond the Medicare Wellness Visit.      Chief Complaint  Medicare Wellness-subsequent, Dizziness (Comes and goes, off balance ), Fatigue, Hypertension (Management), Hypothyroidism (Management), and Diabetes (Management)    Subjective        HPI  Hilaria Klein is also being seen today for occasional dizziness with chronic fatigue symptoms.  States she gets a little lightheaded off and on but not daily.  This is similar to when she thinks her thyroid needs to be adjusted.  Has been taking her thyroid medication as directed.  This was changed back in April due to low TSH.  States she did better with the prior dose.  She had been on vitamin B12 shots when she was younger due to vitamin B12 deficiency.  She has not been on in many years.         Objective   Vital Signs:  /78 (BP Location: Left arm, Patient Position: Sitting, Cuff Size: Large Adult)   Pulse 64   Temp 97.7 °F (36.5 °C)   Resp 14   Ht 165.1 cm (65\")   Wt 93.4 kg (206 lb)   SpO2 99%   BMI 34.28 kg/m²     Physical Exam  Vitals and nursing note reviewed.   Constitutional:       Appearance: Normal appearance. She is well-developed and well-groomed. She is obese.   HENT:      Head: Normocephalic and atraumatic.      Jaw: There is normal jaw occlusion.      Right Ear: Hearing, tympanic membrane, ear " canal and external ear normal.      Left Ear: Hearing, tympanic membrane, ear canal and external ear normal.      Nose: Nose normal.      Right Sinus: No maxillary sinus tenderness or frontal sinus tenderness.      Left Sinus: No maxillary sinus tenderness or frontal sinus tenderness.      Mouth/Throat:      Lips: Pink.      Mouth: Mucous membranes are moist.      Dentition: Normal dentition.      Tongue: No lesions.      Pharynx: Oropharynx is clear. Uvula midline.      Tonsils: No tonsillar exudate.   Eyes:      General: Lids are normal.      Conjunctiva/sclera: Conjunctivae normal.      Pupils: Pupils are equal, round, and reactive to light.   Neck:      Thyroid: No thyroid mass, thyromegaly or thyroid tenderness.      Vascular: No carotid bruit.      Trachea: Trachea and phonation normal. No tracheal tenderness.   Cardiovascular:      Rate and Rhythm: Normal rate and regular rhythm.      Pulses: Normal pulses.      Heart sounds: Normal heart sounds, S1 normal and S2 normal. No murmur heard.  Pulmonary:      Effort: Pulmonary effort is normal.      Breath sounds: Normal breath sounds and air entry.   Abdominal:      General: Bowel sounds are normal.      Palpations: Abdomen is soft.      Tenderness: There is no abdominal tenderness. There is no right CVA tenderness, left CVA tenderness, guarding or rebound. Negative signs include Byrd's sign, Rovsing's sign, McBurney's sign, psoas sign and obturator sign.   Musculoskeletal:      Cervical back: Neck supple.      Right lower leg: No edema.      Left lower leg: No edema.   Feet:      Right foot:      Protective Sensation: 10 sites tested.  10 sites sensed.      Skin integrity: Dry skin present.      Toenail Condition: Right toenails are normal.      Left foot:      Protective Sensation: 10 sites tested.  10 sites sensed.      Skin integrity: Dry skin present.      Toenail Condition: Left toenails are normal.      Comments:     Lymphadenopathy:      Cervical: No  cervical adenopathy.      Right cervical: No superficial, deep or posterior cervical adenopathy.     Left cervical: No superficial, deep or posterior cervical adenopathy.   Skin:     General: Skin is warm and dry.      Capillary Refill: Capillary refill takes less than 2 seconds.   Neurological:      Mental Status: She is alert and oriented to person, place, and time.      Deep Tendon Reflexes:      Reflex Scores:       Patellar reflexes are 2+ on the right side and 2+ on the left side.  Psychiatric:         Attention and Perception: Attention and perception normal.         Mood and Affect: Mood and affect normal.         Speech: Speech normal.         Behavior: Behavior is cooperative.         Thought Content: Thought content normal.         Cognition and Memory: Cognition and memory normal.         Judgment: Judgment normal.            Body mass index is 34.28 kg/m².    Patient's (Body mass index is 34.28 kg/m².) indicates that they are obese (BMI >30) with health related conditions that include hypertension and dyslipidemias . Weight is improving with lifestyle modifications. BMI is is above average; BMI management plan is completed. We discussed low calorie, low carb based diet program, portion control, increasing exercise, joining a fitness center or start home based exercise program, and Information on healthy weight added to patient's after visit summary.       CMP          4/3/2023    09:00 10/5/2023    10:06   CMP   Glucose 109  108    BUN 11  11    Creatinine 0.92  0.78    Sodium 139  138    Potassium 4.4  4.6    Chloride 101  101    Calcium 9.7  9.8    Total Protein 6.8  6.7    Albumin 4.2  4.3    Globulin 2.6  2.4    Total Bilirubin 0.6  0.5    Alkaline Phosphatase 96  109    AST (SGOT) 22  34    ALT (SGPT) 30  50    BUN/Creatinine Ratio 12.0  14.1      CBC          4/3/2023    09:00 10/5/2023    10:06   CBC   WBC 7.90  7.74    RBC 4.66  4.73    Hemoglobin 13.5  13.6    Hematocrit 40.8  41.4    MCV 87.6   87.5    MCH 29.0  28.8    MCHC 33.1  32.9    RDW 12.9  13.0    Platelets 360  369      Lipid Panel          4/3/2023    08:56 10/5/2023    10:06   Lipid Panel   Total Cholesterol 154  240    Triglycerides 110  140    HDL Cholesterol 43  43    VLDL Cholesterol 20  26    LDL Cholesterol  91  171    LDL/HDL Ratio 2.07  3.93      TSH          4/3/2023    08:56 6/12/2023    10:32   TSH   TSH 0.658  0.121      Most Recent A1C          10/5/2023    10:06   HGBA1C Most Recent   Hemoglobin A1C 6.00      Microalbumin          4/3/2023    09:00   Microalbumin   Microalbumin, Urine 9.2                 Assessment and Plan   Diagnoses and all orders for this visit:    1. Medicare annual wellness visit, subsequent (Primary)  -     Mammo screening digital tomosynthesis bilateral w CAD; Future    2. Type 2 diabetes mellitus with hyperglycemia, without long-term current use of insulin  -     pravastatin (Pravachol) 20 MG tablet; Take 1 tablet by mouth Daily.  Dispense: 90 tablet; Refill: 0    3. Essential hypertension    4. Hypercholesterolemia  -     pravastatin (Pravachol) 20 MG tablet; Take 1 tablet by mouth Daily.  Dispense: 90 tablet; Refill: 0    5. Acquired hypothyroidism  -     Thyroid Panel With TSH    6. Need for influenza vaccination  -     Fluzone High-Dose 65+yrs    7. Postmenopausal  -     DEXA Bone Density Axial  -     Mammo screening digital tomosynthesis bilateral w CAD; Future    8. Screening mammogram for breast cancer    9. Encounter for screening mammogram for malignant neoplasm of breast  -     Mammo screening digital tomosynthesis bilateral w CAD; Future    10. Chronic fatigue  -     Vitamin B12  -     Thyroid Panel With TSH    11. Class 1 obesity due to excess calories with serious comorbidity and body mass index (BMI) of 34.0 to 34.9 in adult     Annual Medicare welcome physical with chronic and stable Hypertension:  I have rechecked her blood pressure and got 140/84 in left arm.  Contedwardune her current Toprol  medication and Cozaar medication at home as directed.  Follow-up in 6 months.  2.  Chronic and stable type 2 diabetes with hyperglycemia with hypercholesteremia: She has been off of her cholesterol medication due to cost.  We will try pravastatin 20 mg which I have sent to pharmacy.  She will take as directed.  I have reviewed above blood work with her today.  Diabetes is currently controlled with diet only.  We will recheck fasting lab work in 6 months.  3.  Chronic and stable hypothyroidism with chronic fatigue: We will check vitamin B12 and thyroid profile with TSH today.  Hilaria will be notified of test results and any medication changes.  4.  Need for influenza vaccination: She has given written consent to receive high-dose flu shot today.  5.  Need for screening mammogram with postmenopause: Have placed orders for DEXA scan and mammogram for [facility].  She will be notified of test results when completed.  6.  Chronic and improving obesity: She has lost 2 pounds since last office visit.  Have given her a calorie counter diet to follow to help lose weight and to eat healthy.  Will reevaluate in 6 months.     I spent 30 minutes caring for Hilaria on this date of service. This time includes time spent by me in the following activities:preparing for the visit, reviewing tests, obtaining and/or reviewing a separately obtained history, performing a medically appropriate examination and/or evaluation , counseling and educating the patient/family/caregiver, ordering medications, tests, or procedures, and documenting information in the medical record  Follow Up   Return in about 6 months (around 4/16/2024), or DM chol labs prior---labs today.  Patient was given instructions and counseling regarding her condition or for health maintenance advice. Please see specific information pulled into the AVS if appropriate.       Patient Counseling:  --Nutrition: Stressed importance of moderation in sodium/caffeine  intake, saturated fat and cholesterol.  Discussed caloric balance, sufficient intake of fresh fruits, vegetables, fiber,   calcium, iron.  --Discussed the new recommendation against daily use of baby aspirin for primary prevention in low risk patients.  --Exercise: Stressed the importance of regular exercise by incorporating into daily routine.    --Substance Abuse: Discussed cessation/primary prevention of tobacco, alcohol, or other drug use; driving or other dangerous activities under the influence.    --Dental health: Discussed importance of regular tooth brushing, flossing, and dental visits.  -- Suggested having eyes and vision checked if needed or past due.  --Immunizations reviewed.  --Discussed benefits of screening colonoscopy.    [facility]  [address] [place] [id number]  Dept: [phone]  Dept Fax: [phone]  Loc: [phone]  Loc Fax: [phone]

## 2023-10-17 LAB
FT4I SERPL CALC-MCNC: 4 (ref 1.2–4.9)
T3RU NFR SERPL: 26 % (ref 24–39)
T4 SERPL-MCNC: 15.3 UG/DL (ref 4.5–12)
TSH SERPL DL<=0.005 MIU/L-ACNC: 0.82 UIU/ML (ref 0.45–4.5)
VIT B12 SERPL-MCNC: 981 PG/ML (ref 211–946)

## 2023-10-21 DIAGNOSIS — K21.00 GASTROESOPHAGEAL REFLUX DISEASE WITH ESOPHAGITIS WITHOUT HEMORRHAGE: ICD-10-CM

## 2023-10-23 RX ORDER — OMEPRAZOLE 40 MG/1
40 CAPSULE, DELAYED RELEASE ORAL DAILY
Qty: 90 CAPSULE | Refills: 2 | Status: SHIPPED | OUTPATIENT
Start: 2023-10-23

## 2023-11-01 ENCOUNTER — HOSPITAL ENCOUNTER (OUTPATIENT)
Dept: MAMMOGRAPHY | Facility: HOSPITAL | Age: 73
Discharge: HOME | End: 2023-11-01
Payer: MEDICARE

## 2023-11-01 ENCOUNTER — APPOINTMENT (OUTPATIENT)
Dept: BONE DENSITY | Facility: HOSPITAL | Age: 73
End: 2023-11-01
Payer: MEDICARE

## 2023-11-01 DIAGNOSIS — Z00.00 MEDICARE ANNUAL WELLNESS VISIT, SUBSEQUENT: ICD-10-CM

## 2023-11-01 DIAGNOSIS — Z12.31 ENCOUNTER FOR SCREENING MAMMOGRAM FOR MALIGNANT NEOPLASM OF BREAST: ICD-10-CM

## 2023-11-01 DIAGNOSIS — Z78.0 POSTMENOPAUSAL: ICD-10-CM

## 2023-11-01 PROCEDURE — 77080 DXA BONE DENSITY AXIAL: CPT

## 2023-11-01 PROCEDURE — 77067 SCR MAMMO BI INCL CAD: CPT

## 2023-11-01 PROCEDURE — 77063 BREAST TOMOSYNTHESIS BI: CPT

## 2024-01-19 DIAGNOSIS — I10 ESSENTIAL HYPERTENSION: ICD-10-CM

## 2024-01-19 RX ORDER — METOPROLOL SUCCINATE 25 MG/1
25 TABLET, EXTENDED RELEASE ORAL DAILY
Qty: 90 TABLET | Refills: 2 | Status: SHIPPED | OUTPATIENT
Start: 2024-01-19

## 2024-01-21 DIAGNOSIS — E11.65 TYPE 2 DIABETES MELLITUS WITH HYPERGLYCEMIA, WITHOUT LONG-TERM CURRENT USE OF INSULIN: ICD-10-CM

## 2024-01-21 DIAGNOSIS — E78.00 HYPERCHOLESTEROLEMIA: ICD-10-CM

## 2024-01-22 RX ORDER — PRAVASTATIN SODIUM 20 MG
20 TABLET ORAL DAILY
Qty: 90 TABLET | Refills: 1 | Status: SHIPPED | OUTPATIENT
Start: 2024-01-22

## 2024-01-24 DIAGNOSIS — E03.9 ACQUIRED HYPOTHYROIDISM: ICD-10-CM

## 2024-01-24 RX ORDER — LEVOTHYROXINE SODIUM 0.12 MG/1
125 TABLET ORAL DAILY
Qty: 90 TABLET | Refills: 2 | Status: SHIPPED | OUTPATIENT
Start: 2024-01-24

## 2024-01-24 NOTE — TELEPHONE ENCOUNTER
Caller: Hilaria Klein    Relationship: Self    Best call back number: [phone]    Requested Prescriptions:   Requested Prescriptions     Pending Prescriptions Disp Refills    levothyroxine (Synthroid) 125 MCG tablet 30 tablet 3     Sig: Take 1 tablet by mouth Daily.        [id number] [place] [address] AT [facility] [address] - [phone]  - [phone] FX     Last office visit with prescribing clinician: 10/16/2023   Last telemedicine visit with prescribing clinician: Visit date not found   Next office visit with prescribing clinician: 4/24/2024     Additional details provided by patient:     Does the patient have less than a 3 day supply:  [] Yes  [] No    Would you like a call back once the refill request has been completed: [x] Yes [] No    If the office needs to give you a call back, can they leave a voicemail: [x] Yes [] No    Vania Pike Rep   01/24/24 13:42 EST

## 2024-02-14 ENCOUNTER — OFFICE VISIT (OUTPATIENT)
Dept: FAMILY MEDICINE CLINIC | Facility: CLINIC | Age: 74
End: 2024-02-14
Payer: MEDICARE

## 2024-02-14 VITALS
HEIGHT: 65 IN | OXYGEN SATURATION: 100 % | WEIGHT: 207 LBS | HEART RATE: 80 BPM | BODY MASS INDEX: 34.49 KG/M2 | SYSTOLIC BLOOD PRESSURE: 146 MMHG | DIASTOLIC BLOOD PRESSURE: 80 MMHG | TEMPERATURE: 97.5 F

## 2024-02-14 DIAGNOSIS — R07.89 ATYPICAL CHEST PAIN: ICD-10-CM

## 2024-02-14 DIAGNOSIS — I49.9 IRREGULAR HEART BEAT: Primary | ICD-10-CM

## 2024-02-14 PROBLEM — S43.004A SHOULDER DISLOCATION, RIGHT, INITIAL ENCOUNTER: Status: RESOLVED | Noted: 2022-10-05 | Resolved: 2024-02-14

## 2024-02-14 PROBLEM — Z12.11 ENCOUNTER FOR SCREENING COLONOSCOPY: Status: RESOLVED | Noted: 2017-08-16 | Resolved: 2024-02-14

## 2024-02-14 PROCEDURE — 99213 OFFICE O/P EST LOW 20 MIN: CPT | Performed by: FAMILY MEDICINE

## 2024-02-14 PROCEDURE — 3077F SYST BP >= 140 MM HG: CPT | Performed by: FAMILY MEDICINE

## 2024-02-14 PROCEDURE — 3079F DIAST BP 80-89 MM HG: CPT | Performed by: FAMILY MEDICINE

## 2024-02-14 PROCEDURE — 1160F RVW MEDS BY RX/DR IN RCRD: CPT | Performed by: FAMILY MEDICINE

## 2024-02-14 PROCEDURE — 1159F MED LIST DOCD IN RCRD: CPT | Performed by: FAMILY MEDICINE

## 2024-02-21 ENCOUNTER — HOSPITAL ENCOUNTER (OUTPATIENT)
Dept: CARDIOLOGY | Facility: HOSPITAL | Age: 74
Discharge: HOME | End: 2024-02-21
Admitting: FAMILY MEDICINE
Payer: MEDICARE

## 2024-02-21 DIAGNOSIS — R07.89 ATYPICAL CHEST PAIN: ICD-10-CM

## 2024-02-21 DIAGNOSIS — I49.9 IRREGULAR HEART BEAT: ICD-10-CM

## 2024-02-21 LAB
BH CV STRESS BP STAGE 1: NORMAL
BH CV STRESS BP STAGE 2: NORMAL
BH CV STRESS DURATION MIN STAGE 1: 3
BH CV STRESS DURATION SEC STAGE 1: 0
BH CV STRESS DURATION SEC STAGE 2: 51
BH CV STRESS GRADE STAGE 1: 10
BH CV STRESS GRADE STAGE 2: 12
BH CV STRESS HR STAGE 1: 141
BH CV STRESS HR STAGE 2: 150
BH CV STRESS METS STAGE 1: 4.6
BH CV STRESS METS STAGE 2: 5.6
BH CV STRESS PROTOCOL 1: NORMAL
BH CV STRESS RECOVERY BP: NORMAL MMHG
BH CV STRESS RECOVERY HR: 88 BPM
BH CV STRESS SPEED STAGE 1: 1.7
BH CV STRESS SPEED STAGE 2: 2.5
BH CV STRESS STAGE 1: 1
BH CV STRESS STAGE 2: 2
MAXIMAL PREDICTED HEART RATE: 149 BPM
PERCENT MAX PREDICTED HR: 113.42 %
STRESS BASELINE BP: NORMAL MMHG
STRESS BASELINE HR: 78 BPM
STRESS O2 SAT REST: 96 %
STRESS PERCENT HR: 133 %
STRESS POST ESTIMATED WORKLOAD: 5.6 METS
STRESS POST EXERCISE DUR MIN: 3 MIN
STRESS POST EXERCISE DUR SEC: 51 SEC
STRESS POST O2 SAT PEAK: 96 %
STRESS POST PEAK BP: NORMAL MMHG
STRESS POST PEAK HR: 169 BPM
STRESS TARGET HR: 127 BPM

## 2024-02-21 PROCEDURE — 93016 CV STRESS TEST SUPVJ ONLY: CPT | Performed by: INTERNAL MEDICINE

## 2024-02-21 PROCEDURE — 93017 CV STRESS TEST TRACING ONLY: CPT

## 2024-02-21 PROCEDURE — 93018 CV STRESS TEST I&R ONLY: CPT | Performed by: INTERNAL MEDICINE

## 2024-02-23 ENCOUNTER — OFFICE VISIT (OUTPATIENT)
Dept: FAMILY MEDICINE CLINIC | Facility: CLINIC | Age: 74
End: 2024-02-23
Payer: MEDICARE

## 2024-02-23 VITALS
RESPIRATION RATE: 15 BRPM | BODY MASS INDEX: 34.49 KG/M2 | HEART RATE: 62 BPM | SYSTOLIC BLOOD PRESSURE: 130 MMHG | TEMPERATURE: 98.2 F | WEIGHT: 207 LBS | HEIGHT: 65 IN | DIASTOLIC BLOOD PRESSURE: 74 MMHG | OXYGEN SATURATION: 97 %

## 2024-02-23 DIAGNOSIS — I10 ESSENTIAL HYPERTENSION: Primary | ICD-10-CM

## 2024-02-23 DIAGNOSIS — R00.2 HEART PALPITATIONS: ICD-10-CM

## 2024-02-23 DIAGNOSIS — R53.82 CHRONIC FATIGUE: ICD-10-CM

## 2024-02-23 PROCEDURE — 1159F MED LIST DOCD IN RCRD: CPT | Performed by: PHYSICIAN ASSISTANT

## 2024-02-23 PROCEDURE — 3078F DIAST BP <80 MM HG: CPT | Performed by: PHYSICIAN ASSISTANT

## 2024-02-23 PROCEDURE — 1160F RVW MEDS BY RX/DR IN RCRD: CPT | Performed by: PHYSICIAN ASSISTANT

## 2024-02-23 PROCEDURE — 99214 OFFICE O/P EST MOD 30 MIN: CPT | Performed by: PHYSICIAN ASSISTANT

## 2024-02-23 PROCEDURE — 93000 ELECTROCARDIOGRAM COMPLETE: CPT | Performed by: PHYSICIAN ASSISTANT

## 2024-02-23 PROCEDURE — 3075F SYST BP GE 130 - 139MM HG: CPT | Performed by: PHYSICIAN ASSISTANT

## 2024-02-23 NOTE — PROGRESS NOTES
"Chief Complaint  Palpitations    Subjective          Hilaria Klein presents to [facility]  History of Present Illness  Hilaria is a 73-year-old female who presents for follow-up for heart palpitations.  States she was recently seen in office on February 14, 2024.  Hilaria states she has been having periods of irregular heart rate.  States it feels like her heart is \"quivering\".  She had recent walking stress test which they had to stop due to clinician observing shortness of breath and fatigue.  States she did read the achieved heart rate.  States she has had a couple more episodes of the irregular heart beat.  She may get shortness of breath with these from time to time.  She has been compliant with her Toprol medication.  Currently drinks approximately 40-60 ounces of water daily and 16 ounces of coffee.  She tries to eat healthy.  She has not been exercising regularly.  Denied any current chest pain, wheezing, dizziness or swelling of ankles.  She does get tired but she has been fatigued since she was 42.  Sleep has been normal.  She had an episode the other night which did keep her up so.  States she got very anxious with the heart palpitations.     Objective   Vital Signs:   /74   Pulse 62   Temp 98.2 °F (36.8 °C)   Resp 15   Ht 165.1 cm (65\")   Wt 93.9 kg (207 lb)   SpO2 97%   BMI 34.45 kg/m²     Physical Exam  Vitals and nursing note reviewed.   Constitutional:       Appearance: Normal appearance. She is well-developed and well-groomed. She is obese.   HENT:      Head: Normocephalic and atraumatic.   Neck:      Thyroid: No thyroid mass, thyromegaly or thyroid tenderness.      Vascular: No carotid bruit.      Trachea: Trachea and phonation normal. No tracheal tenderness.   Cardiovascular:      Rate and Rhythm: Normal rate and regular rhythm.      Pulses: Normal pulses.      Heart sounds: Normal heart sounds, S1 normal and S2 normal. No murmur heard.  Pulmonary: "      Effort: Pulmonary effort is normal.      Breath sounds: Normal breath sounds and air entry.   Abdominal:      General: Bowel sounds are normal.      Palpations: Abdomen is soft. There is no hepatomegaly.      Tenderness: There is no abdominal tenderness. There is no right CVA tenderness, left CVA tenderness, guarding or rebound. Negative signs include Byrd's sign, Rovsing's sign, McBurney's sign, psoas sign and obturator sign.   Musculoskeletal:      Cervical back: Neck supple.      Right lower leg: No edema.      Left lower leg: No edema.   Skin:     General: Skin is warm and dry.      Capillary Refill: Capillary refill takes less than 2 seconds.   Neurological:      Mental Status: She is alert and oriented to person, place, and time.   Psychiatric:         Attention and Perception: Attention and perception normal.         Mood and Affect: Mood and affect normal.         Speech: Speech normal.         Behavior: Behavior normal. Behavior is cooperative.         Thought Content: Thought content normal.         Cognition and Memory: Cognition and memory normal.         Judgment: Judgment normal.        Result Review :          Office Visit with Aman Farmer MD (02/14/2024)    Treadmill Stress Test (02/21/2024 10:49)    SCANNED EKG (04/10/2023)      ECG 12 Lead    Date/Time: 2/23/2024 12:08 PM  Performed by: Tennille Lebron PA-C    Authorized by: Tennille Lebron PA-C  Comparison: compared with previous ECG from 4/10/2023  Rhythm: sinus rhythm  Rate: normal  BPM: 65  Conduction: conduction normal  ST Segments: ST segments normal  T Waves: T waves normal  QRS axis: normal    Clinical impression: normal ECG  Comments: Indication: Heart palpitations with hypertension  MO int:  176 ms  QRS dur:  90 ms  QT/QTc:  400/411 ms              Assessment and Plan    Diagnoses and all orders for this visit:    1. Essential hypertension (Primary)  -     CBC & Differential  -     Basic Metabolic Panel  -     Thyroid  Panel With TSH  -     Ambulatory Referral to Cardiology  -     Holter monitor - 48 hour; Future  -     ECG 12 Lead    2. Heart palpitations  -     CBC & Differential  -     Basic Metabolic Panel  -     Thyroid Panel With TSH  -     Ambulatory Referral to Cardiology  -     Holter monitor - 48 hour; Future  -     ECG 12 Lead    3. Chronic fatigue  -     CBC & Differential  -     Basic Metabolic Panel  -     Thyroid Panel With TSH  -     Ambulatory Referral to Cardiology  -     Holter monitor - 48 hour; Future    Hilaria was seen in office today with chronic and stable hypertension with new heart palpitations with chronic fatigue: I have reviewed above EKG's, recent office visit with Dr. Farmer on February 14, 2024 and recent walking stress test.  In office EKG today showed normal sinus rhythm.  I suspect she may be having some periods of A-fib.  Hilaria will continue her current Toprol medication at home as directed.  Blood pressure was in therapeutic range.  I will schedule Holter monitor for further evaluation.  She will have a CBC, BMP and thyroid profile with TSH collected today as well.  I have placed a referral to cardiology.  She will continue to stay hydrated and try to decrease her caffeine intake.  Stress the importance of seeking medical attention if heart palpitations/arrhythmia reoccurs.  She voiced understanding.  Keep follow-up appointment in April 2024.    I spent 28 minutes caring for Hilaria on this date of service. This time includes time spent by me in the following activities:preparing for the visit, reviewing tests, obtaining and/or reviewing a separately obtained history, performing a medically appropriate examination and/or evaluation , counseling and educating the patient/family/caregiver, ordering medications, tests, or procedures, referring and communicating with other health care professionals , documenting information in the medical record, and independently interpreting results and  communicating that information with the patient/family/caregiver  Follow Up   Return if symptoms worsen or fail to improve-labs today.  Patient was given instructions and counseling regarding her condition or for health maintenance advice. Please see specific information pulled into the AVS if appropriate.     [facility]  [address] [place] [id number]  Dept: [phone]  Dept Fax: [phone]  Loc: [phone]  Loc Fax: [phone]

## 2024-02-24 LAB
BASOPHILS # BLD AUTO: 0.1 X10E3/UL (ref 0–0.2)
BASOPHILS NFR BLD AUTO: 1 %
BUN SERPL-MCNC: 10 MG/DL (ref 8–27)
BUN/CREAT SERPL: 12 (ref 12–28)
CALCIUM SERPL-MCNC: 9.6 MG/DL (ref 8.7–10.3)
CHLORIDE SERPL-SCNC: 103 MMOL/L (ref 96–106)
CO2 SERPL-SCNC: 21 MMOL/L (ref 20–29)
CREAT SERPL-MCNC: 0.82 MG/DL (ref 0.57–1)
EGFRCR SERPLBLD CKD-EPI 2021: 76 ML/MIN/1.73
EOSINOPHIL # BLD AUTO: 0.5 X10E3/UL (ref 0–0.4)
EOSINOPHIL NFR BLD AUTO: 5 %
ERYTHROCYTE [DISTWIDTH] IN BLOOD BY AUTOMATED COUNT: 13 % (ref 11.7–15.4)
FT4I SERPL CALC-MCNC: 4.3 (ref 1.2–4.9)
GLUCOSE SERPL-MCNC: 98 MG/DL (ref 70–99)
HCT VFR BLD AUTO: 41.6 % (ref 34–46.6)
HGB BLD-MCNC: 13.6 G/DL (ref 11.1–15.9)
IMM GRANULOCYTES # BLD AUTO: 0 X10E3/UL (ref 0–0.1)
IMM GRANULOCYTES NFR BLD AUTO: 0 %
LYMPHOCYTES # BLD AUTO: 2.1 X10E3/UL (ref 0.7–3.1)
LYMPHOCYTES NFR BLD AUTO: 22 %
MCH RBC QN AUTO: 28.3 PG (ref 26.6–33)
MCHC RBC AUTO-ENTMCNC: 32.7 G/DL (ref 31.5–35.7)
MCV RBC AUTO: 87 FL (ref 79–97)
MONOCYTES # BLD AUTO: 0.7 X10E3/UL (ref 0.1–0.9)
MONOCYTES NFR BLD AUTO: 7 %
NEUTROPHILS # BLD AUTO: 6.3 X10E3/UL (ref 1.4–7)
NEUTROPHILS NFR BLD AUTO: 65 %
PLATELET # BLD AUTO: 449 X10E3/UL (ref 150–450)
POTASSIUM SERPL-SCNC: 4.7 MMOL/L (ref 3.5–5.2)
RBC # BLD AUTO: 4.81 X10E6/UL (ref 3.77–5.28)
SODIUM SERPL-SCNC: 141 MMOL/L (ref 134–144)
T3RU NFR SERPL: 28 % (ref 24–39)
T4 SERPL-MCNC: 15.2 UG/DL (ref 4.5–12)
TSH SERPL DL<=0.005 MIU/L-ACNC: 0.59 UIU/ML (ref 0.45–4.5)
WBC # BLD AUTO: 9.6 X10E3/UL (ref 3.4–10.8)

## 2024-03-04 ENCOUNTER — HOSPITAL ENCOUNTER (OUTPATIENT)
Dept: CARDIOLOGY | Facility: HOSPITAL | Age: 74
Discharge: HOME | End: 2024-03-04
Admitting: INTERNAL MEDICINE
Payer: MEDICARE

## 2024-03-04 ENCOUNTER — OFFICE VISIT (OUTPATIENT)
Dept: CARDIOLOGY | Facility: CLINIC | Age: 74
End: 2024-03-04
Payer: MEDICARE

## 2024-03-04 VITALS
HEIGHT: 65 IN | DIASTOLIC BLOOD PRESSURE: 70 MMHG | WEIGHT: 206 LBS | HEART RATE: 91 BPM | SYSTOLIC BLOOD PRESSURE: 130 MMHG | BODY MASS INDEX: 34.32 KG/M2

## 2024-03-04 DIAGNOSIS — R00.2 PALPITATIONS: Primary | ICD-10-CM

## 2024-03-04 DIAGNOSIS — G47.33 OSA (OBSTRUCTIVE SLEEP APNEA): ICD-10-CM

## 2024-03-04 DIAGNOSIS — E78.00 HYPERCHOLESTEROLEMIA: ICD-10-CM

## 2024-03-04 DIAGNOSIS — R06.02 EXERTIONAL SHORTNESS OF BREATH: ICD-10-CM

## 2024-03-04 DIAGNOSIS — I10 ESSENTIAL HYPERTENSION: ICD-10-CM

## 2024-03-04 DIAGNOSIS — R00.2 PALPITATIONS: ICD-10-CM

## 2024-03-04 PROCEDURE — 99204 OFFICE O/P NEW MOD 45 MIN: CPT | Performed by: INTERNAL MEDICINE

## 2024-03-04 PROCEDURE — 3075F SYST BP GE 130 - 139MM HG: CPT | Performed by: INTERNAL MEDICINE

## 2024-03-04 PROCEDURE — 3078F DIAST BP <80 MM HG: CPT | Performed by: INTERNAL MEDICINE

## 2024-03-04 PROCEDURE — 93225 XTRNL ECG REC<48 HRS REC: CPT

## 2024-03-04 PROCEDURE — 93226 XTRNL ECG REC<48 HR SCAN A/R: CPT

## 2024-03-04 PROCEDURE — 93000 ELECTROCARDIOGRAM COMPLETE: CPT | Performed by: INTERNAL MEDICINE

## 2024-03-04 RX ORDER — ASCORBIC ACID 500 MG
1000 TABLET ORAL DAILY
COMMUNITY

## 2024-03-04 RX ORDER — ZINC SULFATE 50(220)MG
50 CAPSULE ORAL DAILY
COMMUNITY

## 2024-03-04 NOTE — PROGRESS NOTES
PATIENTINFORMATION    Date of Office Visit: 2024  Encounter Provider: Kunal Wan MD  Place of Service: [facility] CARDIOLOGY  Patient Name: Hilaria Klein  : 1950    Subjective:     Encounter Date:2024      Patient ID: Hilaria Klein is a 73 y.o. female.    No chief complaint on file.    HPI  Ms. Klein is a 73 years old lady who came to cardiology clinic for evaluation of intermittent palpitations and worsening fatigue symptoms.  She started having intermittent palpitations about a month ago and they come intermittently without any known exacerbating or relieving factors.  She describes episodes as heart beating fast for several minutes and could happen several times a day and does not happen every day.  Last episode was about 2 days ago.  She denies any history of presyncope or syncope, chest pain or trouble breathing during episodes.  She has worsening fatigue of several months duration and she feels worn out all the time.  She also has some exertional shortness of breath.  She reports some neck aching during activities.  She has been on losartan and low-dose metoprolol for several years.  Simvastatin switched to pravastatin by her PCP.  She had a 3-minute treadmill test done recently which was stopped because she was very fatigued.  EKG changes were equivocal for ischemia.  No prior coronary angiogram.  No known family history of premature coronary artery disease.    She retired from the [employer] 6 years ago.  She does not exercise regularly but keep herself busy with house chores covering 1-2 miles every day.      ROS  All systems reviewed and negative except as noted in HPI.    Past Medical History:   Diagnosis Date    Colon polyp     GERD (gastroesophageal reflux disease)     Goiter     Shoulder dislocation, right, initial encounter     Skin cancer     nose       Past Surgical History:   Procedure Laterality Date    APPENDECTOMY      COLONOSCOPY N/A  "10/30/2017    Procedure: COLONOSCOPY; POLYPECTOMY;  Surgeon: Effie Harry MD;  Location: [facility] OR;  Service:     COLONOSCOPY W/ BIOPSIES AND POLYPECTOMY      ENDOSCOPY N/A 10/30/2017    Procedure: ESOPHAGOGASTRODUODENOSCOPY WITH BIOPSY;  Surgeon: Effie Harry MD;  Location: [facility] OR;  Service:     KNEE ARTHROPLASTY Left        Social History     Socioeconomic History    Marital status: Single   Tobacco Use    Smoking status: Never    Smokeless tobacco: Never   Vaping Use    Vaping status: Never Used   Substance and Sexual Activity    Alcohol use: No    Drug use: No    Sexual activity: Defer       Family History   Problem Relation Age of Onset    Hypertension Other     Deep vein thrombosis Other     Breast cancer Neg Hx            ECG 12 Lead    Date/Time: 3/4/2024 11:25 AM  Performed by: Kunal Wan MD    Authorized by: Kunal Wan MD  Comparison: compared with previous ECG from 3/4/2024  Similar to previous ECG  Rhythm: sinus rhythm  Rate: normal  Conduction: conduction normal  ST Segments: ST segments normal  T Waves: T waves normal  QRS axis: normal  Other: no other findings    Clinical impression: normal ECG             Objective:     /70   Pulse 91   Ht 165.1 cm (65\")   Wt 93.4 kg (206 lb)   LMP  (LMP Unknown)   BMI 34.28 kg/m²  Body mass index is 34.28 kg/m².     Constitutional:       General: Not in acute distress.     Appearance: Well-developed. Not diaphoretic.   Eyes:      Pupils: Pupils are equal, round, and reactive to light.   HENT:      Head: Normocephalic and atraumatic.   Neck:      Thyroid: No thyromegaly.   Pulmonary:      Effort: Pulmonary effort is normal. No respiratory distress.      Breath sounds: Normal breath sounds. No wheezing. No rales.   Chest:      Chest wall: Not tender to palpatation.   Cardiovascular:      Normal rate. Regular rhythm.      No gallop.    Pulses:     Intact distal pulses.   Edema:     Peripheral edema absent.   Abdominal:      " General: Bowel sounds are normal. There is no distension.      Palpations: Abdomen is soft.      Tenderness: There is no guarding.   Musculoskeletal: Normal range of motion.         General: No deformity.      Cervical back: Normal range of motion and neck supple. Skin:     General: Skin is warm and dry.      Findings: No rash.   Neurological:      Mental Status: Alert and oriented to person, place, and time.      Cranial Nerves: No cranial nerve deficit.      Deep Tendon Reflexes: Reflexes are normal and symmetric.   Psychiatric:         Judgment: Judgment normal.         Review Of Data: I have reviewed pertinent recent labs, images and documents and pertinent findings included in HPI or assessment below.    Lipid Panel          4/3/2023    08:56 10/5/2023    10:06   Lipid Panel   Total Cholesterol 154  240    Triglycerides 110  140    HDL Cholesterol 43  43    VLDL Cholesterol 20  26    LDL Cholesterol  91  171    LDL/HDL Ratio 2.07  3.93          Assessment/Plan:         Intermittent palpitations  Worsening fatigue/exertional shortness of breath-could be anginal equivalent  Hypercholesterolemia-significantly abnormal LDL in October 2023 while on simvastatin  Essential hypertension-fairly controlled  Obesity with suspected sleep apnea  Hypothyroidism on levothyroxine-most recent levels are within range  Equivocal treadmill test    Significant risk factor for CAD.  I will send her for Lexiscan and echocardiogram.  She will have 48-hour Holter monitor.  Depending may start her on atorvastatin or Crestor.  Home sleep study ordered.      Diagnosis and plan of care discussed with patient and verbalized understanding.            Your medication list            Accurate as of March 4, 2024 11:24 AM. If you have any questions, ask your nurse or doctor.                CONTINUE taking these medications        Instructions Last Dose Given Next Dose Due   ascorbic acid 500 MG tablet  Commonly known as: VITAMIN C      Take 2  tablets by mouth Daily.       ciclopirox 1 % shampoo  Commonly known as: LOPROX      APPLY TOPICALLY 2 TIMES A WEEK AS DIRECTED       levothyroxine 125 MCG tablet  Commonly known as: Synthroid      Take 1 tablet by mouth Daily.       losartan 100 MG tablet  Commonly known as: COZAAR      TAKE 1 TABLET BY MOUTH DAILY       metoprolol succinate XL 25 MG 24 hr tablet  Commonly known as: TOPROL-XL      TAKE 1 TABLET BY MOUTH DAILY       omeprazole 40 MG capsule  Commonly known as: priLOSEC      TAKE 1 CAPSULE BY MOUTH DAILY       pravastatin 20 MG tablet  Commonly known as: PRAVACHOL      TAKE 1 TABLET BY MOUTH DAILY       vitamin D3 125 MCG (5000 UT) capsule capsule      Take 1 capsule by mouth Daily.       zinc sulfate 220 (50 Zn) MG capsule  Commonly known as: ZINCATE      Take 50 mg by mouth Daily.                    Kunal Wan MD  03/04/24  11:24 EST

## 2024-03-08 DIAGNOSIS — I48.0 PAROXYSMAL ATRIAL FIBRILLATION: Primary | ICD-10-CM

## 2024-03-08 NOTE — PROGRESS NOTES
Called patient to discuss.  Newly diagnosed atrial fibrillation.  Start Eliquis 5 mg twice daily.  [id number] score 3    Continue stress and echo next week    Scheduling; please set up to see [facility] or NM in [place] week after echo and stress. Thanks!

## 2024-03-14 ENCOUNTER — HOSPITAL ENCOUNTER (OUTPATIENT)
Dept: SLEEP MEDICINE | Facility: HOSPITAL | Age: 74
End: 2024-03-14
Payer: MEDICARE

## 2024-03-14 DIAGNOSIS — G47.33 OSA (OBSTRUCTIVE SLEEP APNEA): ICD-10-CM

## 2024-03-14 PROCEDURE — 95806 SLEEP STUDY UNATT&RESP EFFT: CPT

## 2024-03-15 PROCEDURE — 95806 SLEEP STUDY UNATT&RESP EFFT: CPT | Performed by: FAMILY MEDICINE

## 2024-03-19 ENCOUNTER — TELEPHONE (OUTPATIENT)
Dept: SLEEP MEDICINE | Facility: HOSPITAL | Age: 74
End: 2024-03-19
Payer: MEDICARE

## 2024-03-20 ENCOUNTER — HOSPITAL ENCOUNTER (OUTPATIENT)
Dept: CARDIOLOGY | Facility: HOSPITAL | Age: 74
Discharge: HOME | End: 2024-03-20
Admitting: INTERNAL MEDICINE
Payer: MEDICARE

## 2024-03-20 ENCOUNTER — APPOINTMENT (OUTPATIENT)
Dept: GENERAL RADIOLOGY | Facility: HOSPITAL | Age: 74
End: 2024-03-20
Payer: MEDICARE

## 2024-03-20 ENCOUNTER — APPOINTMENT (OUTPATIENT)
Dept: CT IMAGING | Facility: HOSPITAL | Age: 74
End: 2024-03-20
Payer: MEDICARE

## 2024-03-20 ENCOUNTER — HOSPITAL ENCOUNTER (OUTPATIENT)
Dept: NUCLEAR MEDICINE | Facility: HOSPITAL | Age: 74
Discharge: HOME | End: 2024-03-20
Payer: MEDICARE

## 2024-03-20 ENCOUNTER — HOSPITAL ENCOUNTER (OUTPATIENT)
Dept: CARDIOLOGY | Facility: HOSPITAL | Age: 74
Discharge: HOME | End: 2024-03-20
Payer: MEDICARE

## 2024-03-20 ENCOUNTER — HOSPITAL ENCOUNTER (EMERGENCY)
Facility: HOSPITAL | Age: 74
Discharge: HOME | End: 2024-03-20
Attending: STUDENT IN AN ORGANIZED HEALTH CARE EDUCATION/TRAINING PROGRAM | Admitting: STUDENT IN AN ORGANIZED HEALTH CARE EDUCATION/TRAINING PROGRAM
Payer: MEDICARE

## 2024-03-20 VITALS
DIASTOLIC BLOOD PRESSURE: 76 MMHG | OXYGEN SATURATION: 95 % | HEART RATE: 91 BPM | WEIGHT: 207 LBS | RESPIRATION RATE: 18 BRPM | HEIGHT: 65 IN | SYSTOLIC BLOOD PRESSURE: 124 MMHG | BODY MASS INDEX: 34.49 KG/M2 | TEMPERATURE: 98.8 F

## 2024-03-20 VITALS
HEIGHT: 65 IN | HEART RATE: 101 BPM | WEIGHT: 205.03 LBS | DIASTOLIC BLOOD PRESSURE: 76 MMHG | SYSTOLIC BLOOD PRESSURE: 191 MMHG | BODY MASS INDEX: 34.16 KG/M2

## 2024-03-20 DIAGNOSIS — R06.02 EXERTIONAL SHORTNESS OF BREATH: ICD-10-CM

## 2024-03-20 DIAGNOSIS — R07.9 NONSPECIFIC CHEST PAIN: Primary | ICD-10-CM

## 2024-03-20 LAB
ALBUMIN SERPL-MCNC: 3.6 G/DL (ref 3.5–5.2)
ALBUMIN/GLOB SERPL: 0.9 G/DL
ALP SERPL-CCNC: 104 U/L (ref 39–117)
ALT SERPL W P-5'-P-CCNC: 26 U/L (ref 1–33)
ANION GAP SERPL CALCULATED.3IONS-SCNC: 12.9 MMOL/L (ref 5–15)
AORTIC DIMENSIONLESS INDEX: 0.9 (DI)
AST SERPL-CCNC: 23 U/L (ref 1–32)
BASOPHILS # BLD AUTO: 0.06 10*3/MM3 (ref 0–0.2)
BASOPHILS NFR BLD AUTO: 0.4 % (ref 0–1.5)
BH CV ECHO MEAS - AO MAX PG: 8.2 MMHG
BH CV ECHO MEAS - AO MEAN PG: 4 MMHG
BH CV ECHO MEAS - AO V2 MAX: 143 CM/SEC
BH CV ECHO MEAS - AO V2 VTI: 23.6 CM
BH CV ECHO MEAS - AVA(I,D): 3 CM2
BH CV ECHO MEAS - EDV(CUBED): 74.1 ML
BH CV ECHO MEAS - EDV(MOD-SP2): 68 ML
BH CV ECHO MEAS - EDV(MOD-SP4): 76 ML
BH CV ECHO MEAS - EF(MOD-BP): 72.2 %
BH CV ECHO MEAS - EF(MOD-SP2): 73.5 %
BH CV ECHO MEAS - EF(MOD-SP4): 71.1 %
BH CV ECHO MEAS - ESV(CUBED): 23.9 ML
BH CV ECHO MEAS - ESV(MOD-SP2): 18 ML
BH CV ECHO MEAS - ESV(MOD-SP4): 22 ML
BH CV ECHO MEAS - FS: 31.4 %
BH CV ECHO MEAS - IVS/LVPW: 1 CM
BH CV ECHO MEAS - IVSD: 0.9 CM
BH CV ECHO MEAS - LAT PEAK E' VEL: 8.4 CM/SEC
BH CV ECHO MEAS - LV DIASTOLIC VOL/BSA (35-75): 38 CM2
BH CV ECHO MEAS - LV MASS(C)D: 118.7 GRAMS
BH CV ECHO MEAS - LV MAX PG: 5.7 MMHG
BH CV ECHO MEAS - LV MEAN PG: 3 MMHG
BH CV ECHO MEAS - LV SYSTOLIC VOL/BSA (12-30): 11 CM2
BH CV ECHO MEAS - LV V1 MAX: 119 CM/SEC
BH CV ECHO MEAS - LV V1 VTI: 20.5 CM
BH CV ECHO MEAS - LVIDD: 4.2 CM
BH CV ECHO MEAS - LVIDS: 2.9 CM
BH CV ECHO MEAS - LVOT AREA: 3.5 CM2
BH CV ECHO MEAS - LVOT DIAM: 2.11 CM
BH CV ECHO MEAS - LVPWD: 0.9 CM
BH CV ECHO MEAS - MED PEAK E' VEL: 6.1 CM/SEC
BH CV ECHO MEAS - MV A MAX VEL: 86.5 CM/SEC
BH CV ECHO MEAS - MV DEC SLOPE: 473.5 CM/SEC2
BH CV ECHO MEAS - MV DEC TIME: 0.18 SEC
BH CV ECHO MEAS - MV E MAX VEL: 68.7 CM/SEC
BH CV ECHO MEAS - MV E/A: 0.79
BH CV ECHO MEAS - MV MAX PG: 3.3 MMHG
BH CV ECHO MEAS - MV MEAN PG: 1.41 MMHG
BH CV ECHO MEAS - MV P1/2T: 53.6 MSEC
BH CV ECHO MEAS - MV V2 VTI: 19.4 CM
BH CV ECHO MEAS - MVA(P1/2T): 4.1 CM2
BH CV ECHO MEAS - MVA(VTI): 3.7 CM2
BH CV ECHO MEAS - PA ACC TIME: 0.09 SEC
BH CV ECHO MEAS - PA V2 MAX: 105 CM/SEC
BH CV ECHO MEAS - QP/QS: 1.09
BH CV ECHO MEAS - RV MAX PG: 2.5 MMHG
BH CV ECHO MEAS - RV V1 MAX: 79.7 CM/SEC
BH CV ECHO MEAS - RV V1 VTI: 16.3 CM
BH CV ECHO MEAS - RVOT DIAM: 2.47 CM
BH CV ECHO MEAS - SI(MOD-SP2): 25 ML/M2
BH CV ECHO MEAS - SI(MOD-SP4): 27 ML/M2
BH CV ECHO MEAS - SV(LVOT): 71.7 ML
BH CV ECHO MEAS - SV(MOD-SP2): 50 ML
BH CV ECHO MEAS - SV(MOD-SP4): 54 ML
BH CV ECHO MEAS - SV(RVOT): 78.2 ML
BH CV ECHO MEASUREMENTS AVERAGE E/E' RATIO: 9.48
BH CV REST NUCLEAR ISOTOPE DOSE: 10.8 MCI
BH CV STRESS BP STAGE 1: NORMAL
BH CV STRESS COMMENTS STAGE 1: NORMAL
BH CV STRESS DURATION MIN STAGE 1: 0
BH CV STRESS DURATION SEC STAGE 1: 30
BH CV STRESS HR STAGE 1: 94
BH CV STRESS NUCLEAR ISOTOPE DOSE: 36 MCI
BH CV STRESS PROTOCOL 1: NORMAL
BH CV STRESS RECOVERY BP: NORMAL MMHG
BH CV STRESS RECOVERY HR: 101 BPM
BH CV STRESS RECOVERY O2: 94 %
BH CV STRESS STAGE 1: 1
BH CV XLRA - RV BASE: 3.6 CM
BH CV XLRA - RV LENGTH: 7.3 CM
BH CV XLRA - TDI S': 16 CM/SEC
BILIRUB SERPL-MCNC: 1.1 MG/DL (ref 0–1.2)
BUN SERPL-MCNC: 6 MG/DL (ref 8–23)
BUN/CREAT SERPL: 7.2 (ref 7–25)
CALCIUM SPEC-SCNC: 8.5 MG/DL (ref 8.6–10.5)
CHLORIDE SERPL-SCNC: 98 MMOL/L (ref 98–107)
CO2 SERPL-SCNC: 23.1 MMOL/L (ref 22–29)
CREAT SERPL-MCNC: 0.83 MG/DL (ref 0.57–1)
D DIMER PPP FEU-MCNC: 0.8 MCGFEU/ML (ref 0–0.71)
DEPRECATED RDW RBC AUTO: 41.6 FL (ref 37–54)
EGFRCR SERPLBLD CKD-EPI 2021: 75.5 ML/MIN/1.73
EOSINOPHIL # BLD AUTO: 0.12 10*3/MM3 (ref 0–0.4)
EOSINOPHIL NFR BLD AUTO: 0.9 % (ref 0.3–6.2)
ERYTHROCYTE [DISTWIDTH] IN BLOOD BY AUTOMATED COUNT: 13.4 % (ref 12.3–15.4)
GEN 5 2HR TROPONIN T REFLEX: 33 NG/L
GLOBULIN UR ELPH-MCNC: 3.8 GM/DL
GLUCOSE SERPL-MCNC: 152 MG/DL (ref 65–99)
HCT VFR BLD AUTO: 40.2 % (ref 34–46.6)
HGB BLD-MCNC: 13 G/DL (ref 12–15.9)
IMM GRANULOCYTES # BLD AUTO: 0.1 10*3/MM3 (ref 0–0.05)
IMM GRANULOCYTES NFR BLD AUTO: 0.7 % (ref 0–0.5)
LEFT ATRIUM VOLUME INDEX: 19.9 ML/M2
LV EF NUC BP: 79 %
LYMPHOCYTES # BLD AUTO: 1.64 10*3/MM3 (ref 0.7–3.1)
LYMPHOCYTES NFR BLD AUTO: 11.7 % (ref 19.6–45.3)
MAXIMAL PREDICTED HEART RATE: 149 BPM
MCH RBC QN AUTO: 27.9 PG (ref 26.6–33)
MCHC RBC AUTO-ENTMCNC: 32.3 G/DL (ref 31.5–35.7)
MCV RBC AUTO: 86.3 FL (ref 79–97)
MONOCYTES # BLD AUTO: 1.28 10*3/MM3 (ref 0.1–0.9)
MONOCYTES NFR BLD AUTO: 9.1 % (ref 5–12)
NEUTROPHILS NFR BLD AUTO: 10.82 10*3/MM3 (ref 1.7–7)
NEUTROPHILS NFR BLD AUTO: 77.2 % (ref 42.7–76)
NRBC BLD AUTO-RTO: 0 /100 WBC (ref 0–0.2)
PLATELET # BLD AUTO: 460 10*3/MM3 (ref 140–450)
PMV BLD AUTO: 9.4 FL (ref 6–12)
POTASSIUM SERPL-SCNC: 3.5 MMOL/L (ref 3.5–5.2)
PROT SERPL-MCNC: 7.4 G/DL (ref 6–8.5)
QT INTERVAL: 322 MS
QT INTERVAL: 349 MS
QTC INTERVAL: 432 MS
QTC INTERVAL: 435 MS
RBC # BLD AUTO: 4.66 10*6/MM3 (ref 3.77–5.28)
SINUS: 3 CM
SODIUM SERPL-SCNC: 134 MMOL/L (ref 136–145)
STRESS BASELINE BP: NORMAL MMHG
STRESS BASELINE HR: 105 BPM
STRESS O2 SAT REST: 95 %
STRESS POST ESTIMATED WORKLOAD: 1 METS
STRESS POST EXERCISE DUR SEC: 30 SEC
STRESS TARGET HR: 127 BPM
TROPONIN T DELTA: 14 NG/L
TROPONIN T SERPL HS-MCNC: 19 NG/L
TROPONIN T SERPL HS-MCNC: 26 NG/L
WBC NRBC COR # BLD AUTO: 14.02 10*3/MM3 (ref 3.4–10.8)

## 2024-03-20 PROCEDURE — 96360 HYDRATION IV INFUSION INIT: CPT

## 2024-03-20 PROCEDURE — 25510000001 PERFLUTREN (DEFINITY) 8.476 MG IN SODIUM CHLORIDE (PF) 0.9 % 10 ML INJECTION: Performed by: INTERNAL MEDICINE

## 2024-03-20 PROCEDURE — 85379 FIBRIN DEGRADATION QUANT: CPT | Performed by: STUDENT IN AN ORGANIZED HEALTH CARE EDUCATION/TRAINING PROGRAM

## 2024-03-20 PROCEDURE — 25810000003 SODIUM CHLORIDE 0.9 % SOLUTION: Performed by: STUDENT IN AN ORGANIZED HEALTH CARE EDUCATION/TRAINING PROGRAM

## 2024-03-20 PROCEDURE — 99285 EMERGENCY DEPT VISIT HI MDM: CPT

## 2024-03-20 PROCEDURE — 93306 TTE W/DOPPLER COMPLETE: CPT

## 2024-03-20 PROCEDURE — 80053 COMPREHEN METABOLIC PANEL: CPT | Performed by: STUDENT IN AN ORGANIZED HEALTH CARE EDUCATION/TRAINING PROGRAM

## 2024-03-20 PROCEDURE — 85025 COMPLETE CBC W/AUTO DIFF WBC: CPT | Performed by: STUDENT IN AN ORGANIZED HEALTH CARE EDUCATION/TRAINING PROGRAM

## 2024-03-20 PROCEDURE — 0 TECHNETIUM SESTAMIBI: Performed by: INTERNAL MEDICINE

## 2024-03-20 PROCEDURE — 25010000002 REGADENOSON 0.4 MG/5ML SOLUTION: Performed by: INTERNAL MEDICINE

## 2024-03-20 PROCEDURE — A9500 TC99M SESTAMIBI: HCPCS | Performed by: INTERNAL MEDICINE

## 2024-03-20 PROCEDURE — 36415 COLL VENOUS BLD VENIPUNCTURE: CPT

## 2024-03-20 PROCEDURE — 71045 X-RAY EXAM CHEST 1 VIEW: CPT

## 2024-03-20 PROCEDURE — 93010 ELECTROCARDIOGRAM REPORT: CPT | Performed by: INTERNAL MEDICINE

## 2024-03-20 PROCEDURE — 93005 ELECTROCARDIOGRAM TRACING: CPT | Performed by: STUDENT IN AN ORGANIZED HEALTH CARE EDUCATION/TRAINING PROGRAM

## 2024-03-20 PROCEDURE — 93017 CV STRESS TEST TRACING ONLY: CPT

## 2024-03-20 PROCEDURE — 78452 HT MUSCLE IMAGE SPECT MULT: CPT

## 2024-03-20 PROCEDURE — 25510000001 IOPAMIDOL PER 1 ML: Performed by: STUDENT IN AN ORGANIZED HEALTH CARE EDUCATION/TRAINING PROGRAM

## 2024-03-20 PROCEDURE — 84484 ASSAY OF TROPONIN QUANT: CPT | Performed by: STUDENT IN AN ORGANIZED HEALTH CARE EDUCATION/TRAINING PROGRAM

## 2024-03-20 PROCEDURE — 71275 CT ANGIOGRAPHY CHEST: CPT

## 2024-03-20 RX ORDER — REGADENOSON 0.08 MG/ML
0.4 INJECTION, SOLUTION INTRAVENOUS
Status: COMPLETED | OUTPATIENT
Start: 2024-03-20 | End: 2024-03-20

## 2024-03-20 RX ADMIN — REGADENOSON 0.4 MG: 0.08 INJECTION, SOLUTION INTRAVENOUS at 09:18

## 2024-03-20 RX ADMIN — SODIUM CHLORIDE 1000 ML: 9 INJECTION, SOLUTION INTRAVENOUS at 16:44

## 2024-03-20 RX ADMIN — TECHNETIUM TC 99M SESTAMIBI 1 DOSE: 1 INJECTION INTRAVENOUS at 09:18

## 2024-03-20 RX ADMIN — TECHNETIUM TC 99M SESTAMIBI 1 DOSE: 1 INJECTION INTRAVENOUS at 08:12

## 2024-03-20 RX ADMIN — SODIUM CHLORIDE 2 ML: 9 INJECTION INTRAMUSCULAR; INTRAVENOUS; SUBCUTANEOUS at 08:05

## 2024-03-20 RX ADMIN — IOPAMIDOL 100 ML: 755 INJECTION, SOLUTION INTRAVENOUS at 16:33

## 2024-03-20 NOTE — ED PROVIDER NOTES
Subjective   History of Present Illness  Pt is a 73 y.o. female with PMH as listed who presents for   Chief Complaint   Patient presents with    Chest Pain     Started at noon today, midsterrnal, sharp, now resolved       Patient is a 73-year-old female presents for midsternal sharp chest pain that began at noon today.  Lasted for over an hour and resolved spontaneously prior to arrival.  Does not have any chest pain at this time.  No associated vomiting shortness of breath or diaphoresis though she did have some mild nausea preceding the chest pain.  States that her sister took her temperature at home and it was 102 though she is afebrile now without any intervention, unclear if this was an accurate temperature measure will continue to monitor her temperature while in the ED.  She has limited risk factors for CAD including no history of smoking, cancer history, no family history of cardiac disease that she is aware of that she does have history of diabetes and hypertension.  No other new complaints.    While nurse was obtaining additional information she now states that she had a stress test this morning due to having had this chest pain intermittently for quite some time.  Has also had stress test earlier this month and echo this month as well as Holter monitor this month.    Review of Systems    Past Medical History:   Diagnosis Date    Atrial fibrillation     Colon polyp     GERD (gastroesophageal reflux disease)     Goiter     Hypertension     Shoulder dislocation, right, initial encounter     Skin cancer     nose       Allergies   Allergen Reactions    Lisinopril Cough    Sulfa Antibiotics Swelling    Penicillins Hives       Past Surgical History:   Procedure Laterality Date    APPENDECTOMY      COLONOSCOPY N/A 10/30/2017    Procedure: COLONOSCOPY; POLYPECTOMY;  Surgeon: Effie Harry MD;  Location: [facility] OR;  Service:     COLONOSCOPY W/ BIOPSIES AND POLYPECTOMY      ENDOSCOPY N/A 10/30/2017    Procedure:  ESOPHAGOGASTRODUODENOSCOPY WITH BIOPSY;  Surgeon: Effie Harry MD;  Location: [facility];  Service:     KNEE ARTHROPLASTY Left        Family History   Problem Relation Age of Onset    Hypertension Other     Deep vein thrombosis Other     Breast cancer Neg Hx        Social History     Socioeconomic History    Marital status: Single   Tobacco Use    Smoking status: Never    Smokeless tobacco: Never   Vaping Use    Vaping status: Never Used   Substance and Sexual Activity    Alcohol use: No    Drug use: No    Sexual activity: Defer           Objective   Physical Exam  Constitutional:       Appearance: Normal appearance.   HENT:      Head: Normocephalic and atraumatic.      Mouth/Throat:      Mouth: Mucous membranes are moist.      Pharynx: Oropharynx is clear.   Eyes:      Conjunctiva/sclera: Conjunctivae normal.   Cardiovascular:      Rate and Rhythm: Regular rhythm. Tachycardia present.      Heart sounds: Normal heart sounds.   Pulmonary:      Effort: Pulmonary effort is normal.      Breath sounds: Normal breath sounds.   Chest:      Chest wall: No tenderness.   Abdominal:      General: Abdomen is flat.      Palpations: Abdomen is soft.      Tenderness: There is no abdominal tenderness.   Musculoskeletal:      Cervical back: Neck supple.   Skin:     General: Skin is warm and dry.   Neurological:      Mental Status: She is alert.   Psychiatric:         Mood and Affect: Mood normal.         Procedures           ED Course  ED Course as of 03/20/24 1947   Wed Mar 20, 2024   1524 Patient is a 73-year-old female presents for chest pain that started at noon today.  Lasted for over an hour and resolved spontaneously.  Will obtain CBC, CMP, troponin, D-dimer as well as EKG and chest x-ray. [JF]   1749 I was significant for white count of 14 and D-dimer of 0.8.  CT PE protocol was obtained which showed some mediastinal lymph nodes but no other acute findings.  Patient's initial troponin was 19, repeat after 2 hours was 33  showing a delta of 14.  Due to significant increase in troponin over 2 hours we will call cardiology for consult and further recommendations. [JF]   1753 Spoke with Dr. Ch with cardiology, recommends third troponin and if stable likely discharge and believe delta to be lab error and if increasing will transfer to [facility].  Will repeat troponin for third time it is recommendation and continue to monitor. [JF]   1945 Spoke with Dr. Ch after third troponin, recommending outpatient cardiology follow-up and no further interventions at this time.  Spoke with patient regarding this, she understands and agrees with plan of care.  All questions answered. [JF]      ED Course User Index  [JF] Herbert Mao MD                HEART Score: 4                              Medical Decision Making  My differential diagnosis for chest pain includes but is not limited to:  Muscle strain, costochondritis, myositis, pleurisy, rib fracture, intercostal neuritis, herpes zoster, tumor, myocardial infarction, coronary syndrome, unstable angina, angina, aortic dissection, mitral valve prolapse, pericarditis, palpitations, pulmonary embolus, pneumonia, pneumothorax, lung cancer, GERD, esophagitis, esophageal spasm      Amount and/or Complexity of Data Reviewed  Labs: ordered.  Radiology: ordered.  ECG/medicine tests: ordered.     Details: EKG  3/20/2024 at 1521  Rhythm sinus tachycardia, rate 108  Normal axis, normal intervals, no ST changes  EKG interpreted by me contemporaneously by me with care    EKG  3/20/2024 at 1749  Rhythm sinus, rate 93  Normal axis, normal intervals, isolated T wave inversion lead III, no other ST changes  EKG interpreted by me contemporaneously by me with care  Discussion of management or test interpretation with external provider(s): Spoke with Dr. Ch with cardiology who recommends third troponin to continue trending and will provide further recommendations after this results.    Spoke  with Dr. Ch with cardiology again after third troponin in the 20s, given that it is stable he recommends patient be discharged with outpatient cardiology follow-up.    Risk  Prescription drug management.        Final diagnoses:   Nonspecific chest pain       ED Disposition  ED Disposition       ED Disposition   Discharge    Condition   Stable    Comment   --               Tennille Lebron, NICKIE  [address] [facility] [id number] [phone]    Schedule an appointment as soon as possible for a visit in 3 days  For re-evaluation    Kunal Wan MD  [address] [id number] [phone]    Schedule an appointment as soon as possible for a visit in 1 day  For re-evaluation         Medication List      No changes were made to your prescriptions during this visit.            Herbert Mao MD  03/20/24 [id number]

## 2024-03-25 ENCOUNTER — OFFICE VISIT (OUTPATIENT)
Dept: CARDIOLOGY | Facility: CLINIC | Age: 74
End: 2024-03-25
Payer: MEDICARE

## 2024-03-25 VITALS
BODY MASS INDEX: 34.49 KG/M2 | SYSTOLIC BLOOD PRESSURE: 150 MMHG | DIASTOLIC BLOOD PRESSURE: 78 MMHG | WEIGHT: 207 LBS | HEIGHT: 65 IN | HEART RATE: 87 BPM

## 2024-03-25 DIAGNOSIS — G47.33 OSA (OBSTRUCTIVE SLEEP APNEA): ICD-10-CM

## 2024-03-25 DIAGNOSIS — E78.00 HYPERCHOLESTEROLEMIA: ICD-10-CM

## 2024-03-25 DIAGNOSIS — I48.0 PAROXYSMAL ATRIAL FIBRILLATION: ICD-10-CM

## 2024-03-25 DIAGNOSIS — R07.89 ATYPICAL CHEST PAIN: Primary | ICD-10-CM

## 2024-03-25 DIAGNOSIS — I10 ESSENTIAL HYPERTENSION: ICD-10-CM

## 2024-03-25 PROCEDURE — 3078F DIAST BP <80 MM HG: CPT | Performed by: PHYSICIAN ASSISTANT

## 2024-03-25 PROCEDURE — 99214 OFFICE O/P EST MOD 30 MIN: CPT | Performed by: PHYSICIAN ASSISTANT

## 2024-03-25 PROCEDURE — 1160F RVW MEDS BY RX/DR IN RCRD: CPT | Performed by: PHYSICIAN ASSISTANT

## 2024-03-25 PROCEDURE — 93000 ELECTROCARDIOGRAM COMPLETE: CPT | Performed by: PHYSICIAN ASSISTANT

## 2024-03-25 PROCEDURE — 3077F SYST BP >= 140 MM HG: CPT | Performed by: PHYSICIAN ASSISTANT

## 2024-03-25 PROCEDURE — 1159F MED LIST DOCD IN RCRD: CPT | Performed by: PHYSICIAN ASSISTANT

## 2024-03-25 NOTE — PROGRESS NOTES
"    CARDIOLOGY        Patient Name: Hilaria Klein  :1950  Age: 73 y.o.  Primary Cardiologist: Kunal Wan MD  Encounter Provider:  Farooq Sanchez PA-C    Date of Service: 24          CHIEF COMPLAINT / REASON FOR OFFICE VISIT     ER follow up    HISTORY OF PRESENT ILLNESS       HPI  Hilaria Klein is a 73 y.o. female who presents today for ER follow up.     Pt has a  history significant for chest pain, palpitations, hypercholesterolemia, HTN, and obesity presents for hospital follow up. PT was initially seen in office on 3-4-24 for palpitations, fatigue, SOA, and chest pain. Pt was sent for lexiscan and echocardiogram along with 48 hour holter. Pt had a home sleep study ordered as well. Pt lexiscan and echo were normal. Pt holter showed atrial fibrillation. Pt was called in Elqiuis 5 mg BID.     Pt went to ER on 3-20-24 sometime after her stress test. Pt felt like she had a fever at that time. Pt had EKG's and serial trended troponin's. Pt had a negative CTA of chest for PE but did show some mediastinal lymph nodes. Cardiology was consulted and recommended follow up.     Pt continues to be fatigue. Pt reports still having a cough and decrease appetite. Pt has not had any chest pain, SOA, palpitations, swelling to legs, or any issues with bleeding or bruising. Pt has not been checking her blood pressure.     The following portions of the patient's history were reviewed and updated as appropriate: allergies, current medications, past family history, past medical history, past social history, past surgical history and problem list.      VITAL SIGNS     Visit Vitals  /78 (BP Location: Left arm)   Pulse 87   Ht 165.1 cm (65\")   Wt 93.9 kg (207 lb)   LMP  (LMP Unknown)   BMI 34.45 kg/m²       @[email or web address]  Wt Readings from Last 3 Encounters:   24 93.9 kg (207 lb)   24 93 kg (205 lb 0.4 oz)   24 93.9 kg (207 lb)     Body mass index is 34.45 " kg/m².        PHYSICAL EXAMINATION     Constitutional:       General: Awake. Not in acute distress.     Appearance: Not in distress.   Pulmonary:      Effort: Pulmonary effort is normal.      Breath sounds: Normal breath sounds.   Cardiovascular:      Normal rate. Regular rhythm.   Pulses:     Intact distal pulses.   Edema:     Peripheral edema absent.   Skin:     General: Skin is warm.   Neurological:      Mental Status: Alert.   Psychiatric:         Behavior: Behavior is cooperative.           REVIEWED DATA       ECG 12 Lead    Date/Time: 3/25/2024 9:38 AM  Performed by: Farooq Sanchez PA-C    Authorized by: Farooq Sanchez PA-C  Comparison: compared with previous ECG   Similar to previous ECG  Rhythm: sinus rhythm  Rate: normal  BPM: 87  Other findings: non-specific ST-T wave changes, low voltage and poor R wave progression    Clinical impression: non-specific ECG  Comments: QTC not prolong          Cardiac Procedures:      Sleep study    Snoring summary:   There were a total of 457 episodes of snoring which translates to 81% of monitored time.     Comments: Patient has not been evaluated by sleep medicine.  Study ordered by her cardiologist.  There was severe range sleep disordered breathing events throughout night of out of center sleep testing often in relation to sustained oxygen desaturations.     Diagnosis:  Severe obstructive sleep apnea. G 47.33.   Sleep related hypoxia, G 47.36     Recommendations:   Schedule the patient for new patient visit (30 minute Time Slot with the Sleep Physician): For evaluation in order to determine the best approach towards the appropriate therapy.       Stress test on 3-20-24  Interpretation Summary         Myocardial perfusion imaging indicates a normal myocardial perfusion study with no evidence of ischemia. Impressions are consistent with a low risk study.    Left ventricular ejection fraction is hyperdynamic (Calculated EF > 70%).    Transthoracic echo on  "3-20-24  Interpretation Summary         Left ventricular systolic function is hyperdynamic (EF > 70%). Calculated left ventricular EF = 72.2% Normal left ventricular cavity size and wall thickness noted. All left ventricular wall segments contract normally. Left ventricular diastolic function was normal.    Limited 2D imaging of cardiac valves     Holter monitor 3-7-24  Interpretation Summary         An abnormal monitor study.    Patient went into A-fib about 40 minutes inot wearing the monitor.  Total A-fib burden was 43.5%.  She was in sinus rhythm at termination of study.       Lipid Panel          4/3/2023    08:56 10/5/2023    10:06   Lipid Panel   Total Cholesterol 154  240    Triglycerides 110  140    HDL Cholesterol 43  43    VLDL Cholesterol 20  26    LDL Cholesterol  91  171    LDL/HDL Ratio 2.07  3.93        Lab Results   Component Value Date     (L) 03/20/2024     02/23/2024    K 3.5 03/20/2024    K 4.7 02/23/2024    CL 98 03/20/2024     02/23/2024    CO2 23.1 03/20/2024    CO2 21 02/23/2024    BUN 6 (L) 03/20/2024    BUN 10 02/23/2024    CREATININE 0.83 03/20/2024    CREATININE 0.82 02/23/2024    EGFRIFNONA 69 09/09/2021    EGFRIFNONA 67 04/19/2021    EGFRIFAFRI 84 09/09/2021    EGFRIFAFRI 81 04/19/2021    GLUCOSE 152 (H) 03/20/2024    GLUCOSE 98 02/23/2024    CALCIUM 8.5 (L) 03/20/2024    CALCIUM 9.6 02/23/2024    PROTENTOTREF 6.7 10/05/2023    PROTENTOTREF 6.8 04/03/2023    ALBUMIN 3.6 03/20/2024    ALBUMIN 4.3 10/05/2023    BILITOT 1.1 03/20/2024    BILITOT 0.5 10/05/2023    AST 23 03/20/2024    AST 34 (H) 10/05/2023    ALT 26 03/20/2024    ALT 50 (H) 10/05/2023     Lab Results   Component Value Date    WBC 14.02 (H) 03/20/2024    WBC 9.6 02/23/2024    HGB 13.0 03/20/2024    HGB 13.6 02/23/2024    HCT 40.2 03/20/2024    HCT 41.6 02/23/2024    MCV 86.3 03/20/2024    MCV 87 02/23/2024     (H) 03/20/2024     02/23/2024     No results found for: \"PROBNP\", \"BNP\"  Lab " Results   Component Value Date    TROPONINT 26 (H) 03/20/2024     Lab Results   Component Value Date    TSH 0.594 02/23/2024    TSH 0.817 10/16/2023             ASSESSMENT & PLAN     Diagnoses and all orders for this visit:    1. Atypical chest pain (Primary)   Recent negative Lexiscan and echocardiogram. No re-occurrence of pain.     2. Paroxysmal atrial fibrillation   Pt is anticoagulated on eliquis. Rate controlled on Toprol xl 25 mg. No bleeding issues. CHADS-VASc score of 4.     3. Essential hypertension   Pt on losartan. PT elevated in office. Pt told to get a bp cuff to monitor it along with low salt diet. Pt sees PCP thurs.     4. MILLI (obstructive sleep apnea)   Newly diagnosed. Pt has follow up with Dr. Walker (Sleep medicine). Pt next appointment is may 2nd. Pt doesn't want to go to [place] to be seen. She is agreeable to see them sooner which will like improve her fatigue.     5. Hypercholesterolemia   Lipids reviewed on 4-3-24. Pt recently started on pravastatin 20 mg.     No distress in office. Pt likely has a viral infection during her visit at the ER. Pt told of lymphadenopathy noted on CT scan. Pt needs to see her sleep medicine physician for treatment. Pt told to call the office with any questions or concerns. Pt to see Dr. Wan in 2 months.     Return in about 2 months (around 5/25/2024).    Future Appointments         Provider Department Center    3/28/2024 7:30 AM Tennille Lebron PA-C [facility]    4/17/2024 8:50 AM [facility]    4/24/2024 10:30 AM Tennille Lebron PA-C [facility]    5/2/2024 8:00 AM Ovi Walker DO [facility] SLEEP MEDICINE     5/30/2024 10:45 AM Kunal Wan MD [facility] CARDIOLOGY [facility]                MEDICATIONS         Discharge Medications            Accurate as of March 25, 2024 10:11 AM. If you  have any questions, ask your nurse or doctor.                Continue These Medications        Instructions Start Date   apixaban 5 MG tablet tablet  Commonly known as: ELIQUIS   5 mg, Oral, 2 Times Daily      ascorbic acid 500 MG tablet  Commonly known as: VITAMIN C   1,000 mg, Oral, Daily      ciclopirox 1 % shampoo  Commonly known as: LOPROX   APPLY TOPICALLY 2 TIMES A WEEK AS DIRECTED      levothyroxine 125 MCG tablet  Commonly known as: Synthroid   125 mcg, Oral, Daily      losartan 100 MG tablet  Commonly known as: COZAAR   TAKE 1 TABLET BY MOUTH DAILY      metoprolol succinate XL 25 MG 24 hr tablet  Commonly known as: TOPROL-XL   25 mg, Oral, Daily      omeprazole 40 MG capsule  Commonly known as: priLOSEC   40 mg, Oral, Daily      pravastatin 20 MG tablet  Commonly known as: PRAVACHOL   20 mg, Oral, Daily      vitamin D3 125 MCG (5000 UT) capsule capsule   5,000 Units, Oral, Daily      zinc sulfate 220 (50 Zn) MG capsule  Commonly known as: ZINCATE   50 mg, Oral, Daily                   **Dragon Disclaimer:   Much of this encounter note is an electronic transcription/translation of spoken language to printed text. The electronic translation of spoken language may permit erroneous, or at times, nonsensical words or phrases to be inadvertently transcribed. Although I have reviewed the note for such errors, some may still exist.

## 2024-03-26 ENCOUNTER — TELEPHONE (OUTPATIENT)
Dept: SLEEP MEDICINE | Facility: HOSPITAL | Age: 74
End: 2024-03-26
Payer: MEDICARE

## 2024-03-26 NOTE — TELEPHONE ENCOUNTER
Received message from Dr. Walker. Cardiology sent a message requesting a sooner follow up. Left message for patient to call back to schedule a sooner follow up.

## 2024-03-28 ENCOUNTER — OFFICE VISIT (OUTPATIENT)
Dept: SLEEP MEDICINE | Facility: HOSPITAL | Age: 74
End: 2024-03-28
Payer: MEDICARE

## 2024-03-28 ENCOUNTER — OFFICE VISIT (OUTPATIENT)
Dept: FAMILY MEDICINE CLINIC | Facility: CLINIC | Age: 74
End: 2024-03-28
Payer: MEDICARE

## 2024-03-28 VITALS
DIASTOLIC BLOOD PRESSURE: 78 MMHG | BODY MASS INDEX: 33.94 KG/M2 | TEMPERATURE: 97.3 F | OXYGEN SATURATION: 96 % | WEIGHT: 203.7 LBS | SYSTOLIC BLOOD PRESSURE: 112 MMHG | RESPIRATION RATE: 15 BRPM | HEIGHT: 65 IN | HEART RATE: 82 BPM

## 2024-03-28 VITALS
OXYGEN SATURATION: 96 % | BODY MASS INDEX: 33.32 KG/M2 | HEART RATE: 88 BPM | HEIGHT: 65 IN | WEIGHT: 200 LBS | DIASTOLIC BLOOD PRESSURE: 74 MMHG | SYSTOLIC BLOOD PRESSURE: 135 MMHG

## 2024-03-28 DIAGNOSIS — E55.9 VITAMIN D INSUFFICIENCY: ICD-10-CM

## 2024-03-28 DIAGNOSIS — E78.00 HYPERCHOLESTEROLEMIA: ICD-10-CM

## 2024-03-28 DIAGNOSIS — Z91.89 AT RISK FOR EXTREME OBESITY WITH ALVEOLAR HYPOVENTILATION: ICD-10-CM

## 2024-03-28 DIAGNOSIS — G47.33 SEVERE OBSTRUCTIVE SLEEP APNEA: Primary | ICD-10-CM

## 2024-03-28 DIAGNOSIS — R07.89 ATYPICAL CHEST PAIN: Primary | ICD-10-CM

## 2024-03-28 DIAGNOSIS — R53.82 CHRONIC FATIGUE: ICD-10-CM

## 2024-03-28 DIAGNOSIS — E66.9 CLASS 1 OBESITY WITH SERIOUS COMORBIDITY AND BODY MASS INDEX (BMI) OF 33.0 TO 33.9 IN ADULT, UNSPECIFIED OBESITY TYPE: ICD-10-CM

## 2024-03-28 DIAGNOSIS — G47.34 SLEEP RELATED HYPOXIA: ICD-10-CM

## 2024-03-28 DIAGNOSIS — I48.91 ATRIAL FIBRILLATION, UNSPECIFIED TYPE: ICD-10-CM

## 2024-03-28 DIAGNOSIS — R63.5 WEIGHT GAIN: ICD-10-CM

## 2024-03-28 DIAGNOSIS — E11.65 TYPE 2 DIABETES MELLITUS WITH HYPERGLYCEMIA, WITHOUT LONG-TERM CURRENT USE OF INSULIN: ICD-10-CM

## 2024-03-28 PROCEDURE — 1160F RVW MEDS BY RX/DR IN RCRD: CPT | Performed by: PHYSICIAN ASSISTANT

## 2024-03-28 PROCEDURE — 1159F MED LIST DOCD IN RCRD: CPT | Performed by: PHYSICIAN ASSISTANT

## 2024-03-28 PROCEDURE — 3078F DIAST BP <80 MM HG: CPT | Performed by: PHYSICIAN ASSISTANT

## 2024-03-28 PROCEDURE — G0463 HOSPITAL OUTPT CLINIC VISIT: HCPCS

## 2024-03-28 PROCEDURE — 3074F SYST BP LT 130 MM HG: CPT | Performed by: PHYSICIAN ASSISTANT

## 2024-03-28 PROCEDURE — 99214 OFFICE O/P EST MOD 30 MIN: CPT | Performed by: PHYSICIAN ASSISTANT

## 2024-03-28 NOTE — PROGRESS NOTES
[facility]  [address]  [address] [place] [id number]  Phone   Fax       Hilaria Klein  [id number]   1950  73 y.o.  female      PCP Tennille Lebron PA-C    Type of service: Initial Sleep Medicine Consult.  Date of service: 3/28/2024      Chief Complaint   Patient presents with    Sleep Apnea    Daytime Sleepiness     Severe sleep apnea and sleep related hypoxia       History of present illness;  The patient was seen today on 3/28/2024 at [facility].      Historians in today's encounter: Patient and Sister   Prior to the onset of the encounter I made sure that the patient provided verbal consent to me to discuss all of the patient's medical information to include any sensitive information/topics in front of the above noted individual also in the room. The patient insisted the above individual stay in the room for entire the encounter to completion.     Hilaria Klein, 73 y.o.. PMHx HTN, A-fib, obesity The patient presents for initial evaluation of sleep sleep disordered breathing. Here for management of severe obstructive sleep apnea ( Home sleep study ordered by her cardiologist DAVID 57.6/h with sleep-related hypoxia on 3/14/2024 -currently not on PAP therapy) Patient  denies prior surgery namely tonsillectomy, nasal surgery or UPPP.       Loud snoring witnessed apneas by multiple individuals  >1 year  Night of home sleep study her sleep was interrupted due to discomfort of sleep with HST device and lights from device  Has been diagnosed with afib states paroxysmal  Body mass index is 33.28 kg/m².   Wt Readings from Last 3 Encounters:   03/28/24 90.7 kg (200 lb)   03/28/24 92.4 kg (203 lb 11.2 oz)   03/25/24 93.9 kg (207 lb)     Multiple Bicarb >27  Weight gain in recent years  Endorses excessive daytime sleepiness    Further Sleep History:    Bedtime: 11 PM  Rise Time: 8am  Sleep Latency: 15-30 minutes  Screens in bed: Yes  Wake after  sleep onset: 3 x  Reasons for awakenings: Apnea no associated symptoms at those times often occurs more than once a night, or nocturia  Number of naps per day denies  Naps restorative: Not up  Caffeine use: 16 ounces per day    RLS Symptoms: No   Bruxism:No   Current sleep related gastroesophageal reflux symptoms:  No   Cataplexy:  No   Sleep Paralysis:  No   Hypnagogic or hypnopompic hallucinations: No   Parasomnias such as sleep walking or sleep eating No     Denies any past known cardiopulmonary conditions save A-fib  Denies past neurologic disorders/neuromuscular disorder  Denies opioid therapy  Denies any metal head/neck/chest  Never needed supplemental O2 at home    Disclaimer Sleep History: The above sleep history is based on this sleep physician's in room encounter with the patient. Pre encounter self administered questionnaires are taken into consideration and discussed with patient for any discordance. The above documentation by this sleep physician is the most accurate clinical information determined by in room sleep physician encounter with patient.     MEDICAL CONDITIONS (PMH)   HTN  A-Fib  Hypothyroidism  Obesity    Social history:  Do you drive a commercial vehicle:  No   Shift work:  No   Tobacco use:  No   Alcohol use: 0 per week  Occupation: retured    Family Hx (parents and siblings) (pertaining to sleep medicine)  Patient unable to provide     Medications: reviewed    Review of systems is negative unless otherwise noted per HPI   Disclaimer History: The above history is based on this sleep physician's in room encounter with the patient. Pre encounter self administered questionnaires are taken into consideration and discussed with patient for any discordance. The above documentation by this sleep physician is the most accurate clinical information determined by in room sleep physician encounter with patient.     Physical exam:  Vitals:    03/28/24 1500   BP: 135/74   Pulse: 88   SpO2: 96%  "  Weight: 90.7 kg (200 lb)   Height: 165.1 cm (65\")    Body mass index is 33.28 kg/m².   CONSTITUTIONAL:  Non-toxic, In no overt distress   Head: normocephalic   ENT: Mallampati class IV, + macroglossia, no septal defects   NECK:,no nuchal rigidity  RESPIRATORY SYSTEM: Breath sounds are clear (no rales, no rhonchi, no wheezes), no accessory muscle use  CARDIOVASULAR SYSTEM: Heart sounds in office are regular rhythm. normal rate, no rub, no gallop, no edema  NEUROLOGICAL SYSTEM: Oriented x 3, No gross focal deficits   PSYCHIATRIC SYSTEM: Goal oriented, affect full range appropriate      Office notes from care team reviewed:    - 3/25/2024 office visit cardiology Farooq Muniz PA-C    Labs reviewed.  TSH          6/12/2023    10:32 10/16/2023    14:31 2/23/2024    11:49   TSH   TSH 0.121  0.817  0.594       Most Recent A1C          10/5/2023    10:06   HGBA1C Most Recent   Hemoglobin A1C 6.00       -4/3/2023  Bicarb 28.4  - 9/30/2022  Bicarb 27.5  -4/19/2021  Bicarb 29.8    Imaging/Diagnostics reviewed:       3/14/2024 HST ordered by her cardiologist  DAVID 57.6/h  Pulse oximetry summary:   · Longest consecutive time spent under 88% is 3.5 minutes  · Lowest oxygen saturation: 71%       -3/20/2024 echocardiogram cardiologist's  Interpretation Summary:  ·  Left ventricular systolic function is hyperdynamic (EF > 70%). Calculated left ventricular EF = 72.2% Normal left ventricular cavity size and wall thickness noted. All left ventricular wall segments contract normally. Left ventricular diastolic function was normal.  ·  Limited 2D imaging of cardiac valves    Assessment and plan:  Severe Obstructive sleep apnea, adult [G47.33]  At risk for extreme obesity with alveolar hypoventilation   Sleep related hypoxia   Intolerance of continuous positive airway pressure (CPAP) ventilation [Z78.9]  -Positive Airway Pressure Titration ordered to guide management of sleep disordered breathing  Patient's symptoms and examination " is consistent with sleep apnea. have talked to the patient about the signs and symptoms of sleep apnea. In addition, I have also discussed pathophysiology of sleep apnea.  I also discussed the complications of untreated sleep apnea including effects on hypertension, diabetes mellitus and nonrestorative sleep with hypersomnia which can increase risk for motor vehicle accidents.  Untreated sleep apnea is also a risk factor for development of atrial fibrillation, hypertension, insulin resistance and cerebrovascular accident. Counseled no driving or operating heavy machinery while sleepy. Patient was given opportunity to ask questions and all the questions were answered.   Excessive daytime sleepiness .  Patient endorses subjective excessive daytime sleepiness with sleep physician encounter which was inconsistent with patient's pre-encounter self-administered [place] Sleepiness Scale of Total score: 4.  There are many causes for daytime excessive sleepiness including depression, shiftwork syndrome, and other medical disorders including heart, kidney and liver failure.  From sleep disorders perspective this is sleep disordered breathing until proven otherwise. The most common cause of excessive sleepiness is due to sleep apnea with frequent awakenings during sleep time.  I have discussed safety of driving and to remain vigilant while driving; patient verbalized understanding of counseling.  Obesity, patient's BMI is Body mass index is 33.28 kg/m².. I have discussed the relationship between weight and sleep apnea.There is direct correlation between weight and severity of sleep apnea.  Weight reduction is encouraged, as it is going to reduce the severity of sleep apnea. I have also discussed with the patient diet and exercise to achieve ideal body weight.  A. Fib,  Appropriate therapeutic treatment  for sleep disordered breathing will be important for this comorbid condition as well.Follow up with cardiology as  previous.    I have also discussed with the patient the following  Sleep hygiene: Maintaining a regular bedtime and wake time, not to watch television or work in bed, limit caffeine-containing beverages before bed time and avoid naps during the day  Adequate amount of sleep.  Generally most people needs about 7 to 8 hours of sleep.      Return for 31 to 90 days after PAP setup with down load.  Patient's questions were answered        EMR Dragon/Transcription disclaimer:   Much of this encounter note is an electronic transcription/translation of spoken language to printed text. The electronic translation of spoken language may permit erroneous, or at times, nonsensical words or phrases to be inadvertently transcribed; Although I have reviewed the note for such errors, some may still exist.     NPI #: [id number]    Ovi Walker, DO  Sleep Medicine  [facility]  03/28/24

## 2024-03-28 NOTE — PROGRESS NOTES
"Chief Complaint  Chest Pain (ER follow-up from March 20, 2024 from [facility])    Subjective          Hilaria Klein presents to [facility]  History of Present Illness  Hilaria is a 73-year-old female who presents with sister, Meghana, at office visit today for hospital discharge follow-up for chest pain.  Hilaria has given me permission to speak freely with her sister in exam room.  States she went to [facility] on March 20, 2024 with chest pain.  States earlier in the day she had a stress test with perfusion.  States she was not feeling well and started having chest pain in center of chest.  She also had a fever at home.  By the time she got to the ER her fever had resolved and chest pain had improved.  She was noted to have elevated troponin levels in the hospital but was felt like it was false positive.  She  was told to follow-up with her cardiologist.  She had a 48-hour Holter monitor that showed A-fib.  Currently on Eliquis for this.  Takes twice daily.  Recently diagnosed with sleep apnea.  She will be getting a CPAP machine.  She is a diabetic and is unsure if she is having fluctuating blood sugars causing fatigue symptoms.  She is fasting today.  Her appetite has been decreased.  Sleep has been normal to restless at times.  Denied any current chest pain, wheezing, cough, abdominal pain, GI upset or swelling of ankles.  She does get shortness of breath at times.     Objective   Vital Signs:   /78 (BP Location: Left arm, Patient Position: Sitting, Cuff Size: Adult)   Pulse 82   Temp 97.3 °F (36.3 °C) (Infrared)   Resp 15   Ht 165.1 cm (65\")   Wt 92.4 kg (203 lb 11.2 oz)   SpO2 96%   BMI 33.90 kg/m²     Physical Exam  Vitals and nursing note reviewed.   Constitutional:       Appearance: Normal appearance. She is well-developed and well-groomed. She is obese.      Comments: Sister is in exam room   HENT:      Head: Normocephalic and atraumatic. "   Neck:      Thyroid: No thyroid mass, thyromegaly or thyroid tenderness.      Vascular: No carotid bruit.      Trachea: Trachea and phonation normal. No tracheal tenderness.   Cardiovascular:      Rate and Rhythm: Normal rate and regular rhythm.      Pulses: Normal pulses.      Heart sounds: Normal heart sounds, S1 normal and S2 normal. No murmur heard.  Pulmonary:      Effort: Pulmonary effort is normal.      Breath sounds: Normal breath sounds and air entry.   Abdominal:      General: Bowel sounds are normal.      Palpations: Abdomen is soft. There is no hepatomegaly.      Tenderness: There is no abdominal tenderness. There is no right CVA tenderness, left CVA tenderness, guarding or rebound. Negative signs include Byrd's sign, Rovsing's sign, McBurney's sign, psoas sign and obturator sign.   Musculoskeletal:      Cervical back: Neck supple.      Right lower leg: No edema.      Left lower leg: No edema.   Feet:      Right foot:      Protective Sensation: 10 sites tested.  10 sites sensed.      Skin integrity: Skin integrity normal.      Toenail Condition: Right toenails are normal.      Left foot:      Protective Sensation: 10 sites tested.  10 sites sensed.      Skin integrity: Skin integrity normal.      Toenail Condition: Left toenails are normal.      Comments: Diabetic Foot Exam Performed and Monofilament Test Performed    Skin:     General: Skin is warm and dry.      Capillary Refill: Capillary refill takes less than 2 seconds.   Neurological:      Mental Status: She is alert and oriented to person, place, and time.   Psychiatric:         Attention and Perception: Attention and perception normal.         Mood and Affect: Mood and affect normal.         Speech: Speech normal.         Behavior: Behavior normal. Behavior is cooperative.         Thought Content: Thought content normal.         Cognition and Memory: Cognition and memory normal.         Judgment: Judgment normal.        Result Review :        Hospital Outpatient Visit on 03/20/2024   Component Date Value Ref Range Status    EF(MOD-bp) 03/20/2024 72.2  % Final    LVIDd 03/20/2024 4.2  cm Final    LVIDs 03/20/2024 2.9  cm Final    IVSd 03/20/2024 0.90  cm Final    LVPWd 03/20/2024 0.90  cm Final    FS 03/20/2024 31.4  % Final    IVS/LVPW 03/20/2024 1.00  cm Final    ESV(cubed) 03/20/2024 23.9  ml Final    LV Sys Vol (BSA corrected) 03/20/2024 11.0  cm2 Final    EDV(cubed) 03/20/2024 74.1  ml Final    LV Velasquez Vol (BSA corrected) 03/20/2024 38.0  cm2 Final    LV mass(C)d 03/20/2024 118.7  grams Final    LVOT area 03/20/2024 3.5  cm2 Final    LVOT diam 03/20/2024 2.11  cm Final    EDV(MOD-sp2) 03/20/2024 68.0  ml Final    EDV(MOD-sp4) 03/20/2024 76.0  ml Final    ESV(MOD-sp2) 03/20/2024 18.0  ml Final    ESV(MOD-sp4) 03/20/2024 22.0  ml Final    SV(MOD-sp2) 03/20/2024 50.0  ml Final    SV(MOD-sp4) 03/20/2024 54.0  ml Final    SI(MOD-sp2) 03/20/2024 25.0  ml/m2 Final    SI(MOD-sp4) 03/20/2024 27.0  ml/m2 Final    EF(MOD-sp2) 03/20/2024 73.5  % Final    EF(MOD-sp4) 03/20/2024 71.1  % Final    MV E max marty 03/20/2024 68.7  cm/sec Final    MV A max marty 03/20/2024 86.5  cm/sec Final    MV dec time 03/20/2024 0.18  sec Final    MV E/A 03/20/2024 0.79   Final    LA ESV Index (BP) 03/20/2024 19.9  ml/m2 Final    Med Peak E' Marty 03/20/2024 6.1  cm/sec Final    Lat Peak E' Marty 03/20/2024 8.4  cm/sec Final    Avg E/e' ratio 03/20/2024 9.48   Final    SV(LVOT) 03/20/2024 71.7  ml Final    SV(RVOT) 03/20/2024 78.2  ml Final    Qp/Qs 03/20/2024 1.09   Final    RV Base 03/20/2024 3.6  cm Final    RV Length 03/20/2024 7.3  cm Final    RV S' 03/20/2024 16.0  cm/sec Final    LV V1 max 03/20/2024 119.0  cm/sec Final    LV V1 max PG 03/20/2024 5.7  mmHg Final    LV V1 mean PG 03/20/2024 3.0  mmHg Final    LV V1 VTI 03/20/2024 20.5  cm Final    Ao pk marty 03/20/2024 143.0  cm/sec Final    Ao max PG 03/20/2024 8.2  mmHg Final    Ao mean PG 03/20/2024 4.0  mmHg Final    Ao V2  VTI 03/20/2024 23.6  cm Final    REGINALDO(I,D) 03/20/2024 3.0  cm2 Final    MV max PG 03/20/2024 3.3  mmHg Final    MV mean PG 03/20/2024 1.41  mmHg Final    MV V2 VTI 03/20/2024 19.4  cm Final    MV P1/2t 03/20/2024 53.6  msec Final    MVA(P1/2t) 03/20/2024 4.1  cm2 Final    MVA(VTI) 03/20/2024 3.7  cm2 Final    MV dec slope 03/20/2024 473.5  cm/sec2 Final    RVOT diam 03/20/2024 2.47  cm Final    RV V1 max PG 03/20/2024 2.5  mmHg Final    RV V1 max 03/20/2024 79.7  cm/sec Final    RV V1 VTI 03/20/2024 16.3  cm Final    PA V2 max 03/20/2024 105.0  cm/sec Final    PA acc time 03/20/2024 0.09  sec Final    Sinus 03/20/2024 3.0  cm Final    Dimensionless Index 03/20/2024 0.90  (DI) Final   Hospital Outpatient Visit on 03/20/2024   Component Date Value Ref Range Status     CV STRESS PROTOCOL 1 03/20/2024 Pharmacologic   Final    Stage 1 03/20/2024 1.0   Final    Duration Min Stage 1 03/20/2024 0   Final    Duration Sec Stage 1 03/20/2024 30   Final    Baseline HR 03/20/2024 105  bpm Final    Baseline BP 03/20/2024 126/66  mmHg Final    O2 sat rest 03/20/2024 95  % Final    Target HR (85%) 03/20/2024 127  bpm Final    Max. Pred. HR (100%) 03/20/2024 149  bpm Final    HR Stage 1 03/20/2024 94   Final    BP Stage 1 03/20/2024 83/52   Final    Stress Comments Stage 1 03/20/2024 30 sec bolus injection   Final    Recovery HR 03/20/2024 101  bpm Final    Recovery BP 03/20/2024 107/57  mmHg Final    Recovery O2 03/20/2024 94  % Final    Exercise duration (sec) 03/20/2024 30  sec Final    Estimated workload 03/20/2024 1.0  METS Final    BH CV REST NUCLEAR ISOTOPE DOSE 03/20/2024 10.8  mCi Final    BH CV STRESS NUCLEAR ISOTOPE DOSE 03/20/2024 36.0  mCi Final    Nuc Stress EF 03/20/2024 79  % Final   Admission on 03/20/2024, Discharged on 03/20/2024   Component Date Value Ref Range Status    QT Interval 03/20/2024 322  ms Final    QTC Interval 03/20/2024 432  ms Final    Glucose 03/20/2024 152 (H)  65 - 99 mg/dL Final    BUN  03/20/2024 6 (L)  8 - 23 mg/dL Final    Creatinine 03/20/2024 0.83  0.57 - 1.00 mg/dL Final    Sodium 03/20/2024 134 (L)  136 - 145 mmol/L Final    Potassium 03/20/2024 3.5  3.5 - 5.2 mmol/L Final    Chloride 03/20/2024 98  98 - 107 mmol/L Final    CO2 03/20/2024 23.1  22.0 - 29.0 mmol/L Final    Calcium 03/20/2024 8.5 (L)  8.6 - 10.5 mg/dL Final    Total Protein 03/20/2024 7.4  6.0 - 8.5 g/dL Final    Albumin 03/20/2024 3.6  3.5 - 5.2 g/dL Final    ALT (SGPT) 03/20/2024 26  1 - 33 U/L Final    AST (SGOT) 03/20/2024 23  1 - 32 U/L Final    Alkaline Phosphatase 03/20/2024 104  39 - 117 U/L Final    Total Bilirubin 03/20/2024 1.1  0.0 - 1.2 mg/dL Final    Globulin 03/20/2024 3.8  gm/dL Final    A/G Ratio 03/20/2024 0.9  g/dL Final    BUN/Creatinine Ratio 03/20/2024 7.2  7.0 - 25.0 Final    Anion Gap 03/20/2024 12.9  5.0 - 15.0 mmol/L Final    eGFR 03/20/2024 75.5  >60.0 mL/min/1.73 Final    HS Troponin T 03/20/2024 19 (H)  <14 ng/L Final    D-Dimer, Quantitative 03/20/2024 0.80 (H)  0.00 - 0.71 MCGFEU/mL Final    WBC 03/20/2024 14.02 (H)  3.40 - 10.80 10*3/mm3 Final    RBC 03/20/2024 4.66  3.77 - 5.28 10*6/mm3 Final    Hemoglobin 03/20/2024 13.0  12.0 - 15.9 g/dL Final    Hematocrit 03/20/2024 40.2  34.0 - 46.6 % Final    MCV 03/20/2024 86.3  79.0 - 97.0 fL Final    MCH 03/20/2024 27.9  26.6 - 33.0 pg Final    MCHC 03/20/2024 32.3  31.5 - 35.7 g/dL Final    RDW 03/20/2024 13.4  12.3 - 15.4 % Final    RDW-SD 03/20/2024 41.6  37.0 - 54.0 fl Final    MPV 03/20/2024 9.4  6.0 - 12.0 fL Final    Platelets 03/20/2024 460 (H)  140 - 450 10*3/mm3 Final    Neutrophil % 03/20/2024 77.2 (H)  42.7 - 76.0 % Final    Lymphocyte % 03/20/2024 11.7 (L)  19.6 - 45.3 % Final    Monocyte % 03/20/2024 9.1  5.0 - 12.0 % Final    Eosinophil % 03/20/2024 0.9  0.3 - 6.2 % Final    Basophil % 03/20/2024 0.4  0.0 - 1.5 % Final    Immature Grans % 03/20/2024 0.7 (H)  0.0 - 0.5 % Final    Neutrophils, Absolute 03/20/2024 10.82 (H)  1.70 - 7.00  10*3/mm3 Final    Lymphocytes, Absolute 03/20/2024 1.64  0.70 - 3.10 10*3/mm3 Final    Monocytes, Absolute 03/20/2024 1.28 (H)  0.10 - 0.90 10*3/mm3 Final    Eosinophils, Absolute 03/20/2024 0.12  0.00 - 0.40 10*3/mm3 Final    Basophils, Absolute 03/20/2024 0.06  0.00 - 0.20 10*3/mm3 Final    Immature Grans, Absolute 03/20/2024 0.10 (H)  0.00 - 0.05 10*3/mm3 Final    nRBC 03/20/2024 0.0  0.0 - 0.2 /100 WBC Final    HS Troponin T 03/20/2024 33 (H)  <14 ng/L Final    Troponin T Delta 03/20/2024 14 (C)  >=-4 - <+4 ng/L Final    QT Interval 03/20/2024 349  ms Final    QTC Interval 03/20/2024 435  ms Final    HS Troponin T 03/20/2024 26 (H)  <14 ng/L Final          ECG 12 Lead Chest Pain (03/20/2024 15:21)    Holter Monitor - 48 Hour (03/04/2024 11:42)    Stress Test With Myocardial Perfusion One Day (03/20/2024 09:18)    Adult Transthoracic Echo Complete w/ Color, Spectral and Contrast if Necessary Per Protocol (03/20/2024 08:04)    Office Visit with Farooq Sanchez PA-C (03/25/2024)    ED with Herbert Mao MD (03/20/2024)         Assessment and Plan    Diagnoses and all orders for this visit:    1. Atypical chest pain (Primary)    2. Type 2 diabetes mellitus with hyperglycemia, without long-term current use of insulin  -     CBC (No Diff)  -     Basic Metabolic Panel  -     Hemoglobin A1c  -     Lipid Panel With LDL / HDL Ratio  -     Microalbumin / Creatinine Urine Ratio - Urine, Clean Catch  -     Ambulatory Referral for Diabetic Eye Exam-Ophthalmology    3. Hypercholesterolemia  -     Basic Metabolic Panel  -     Lipid Panel With LDL / HDL Ratio    4. Chronic fatigue  -     CBC (No Diff)  -     Basic Metabolic Panel  -     Vitamin D,25-Hydroxy  -     Vitamin B12    5. Vitamin D insufficiency  -     Vitamin D,25-Hydroxy    6. Atrial fibrillation, unspecified type    New atypical chest pain with A-fib: Have reviewed her ER report, imaging, lab results and cardiac workup with her at office visit today.  She  will continue her Eliquis at home as directed.  Please keep follow-up appointments with cardiology.  Chronic and stable type 2 diabetes with hyperglycemia without insulin and hypercholesteremia: Hilaria is fasting.  Will check CBC, BMP, A1c, lipid profile, and a urine microalbumin.  She will be notified of test results and any medication changes.  Have given her diabetic eye form to take to ophthalmology.  Keep appointment in April for further evaluation.  3.  Chronic fatigue: Will check vitamin D and vitamin B12 with above blood work.  She will be notified of test results.  Her chronic fatigue may be related to her A-fib and or sleep apnea.  Will monitor.  4.  Chronic vitamin D insufficiency: Will check vitamin D with above blood work.    I spent 30 minutes caring for Hilaria on this date of service. This time includes time spent by me in the following activities:preparing for the visit, reviewing tests, obtaining and/or reviewing a separately obtained history, performing a medically appropriate examination and/or evaluation , counseling and educating the patient/family/caregiver, ordering medications, tests, or procedures, and documenting information in the medical record  Follow Up   Return if symptoms worsen or fail to improve, for labs today.  Patient was given instructions and counseling regarding her condition or for health maintenance advice. Please see specific information pulled into the AVS if appropriate.     [facility]  [address] [place] [id number]  Dept: [phone]  Dept Fax: [phone]  Loc: [phone]  Loc Fax: [phone]

## 2024-03-29 LAB
25(OH)D3+25(OH)D2 SERPL-MCNC: 86 NG/ML (ref 30–100)
ALBUMIN/CREAT UR: <3 MG/G CREAT (ref 0–29)
BUN SERPL-MCNC: 14 MG/DL (ref 8–27)
BUN/CREAT SERPL: 17 (ref 12–28)
CALCIUM SERPL-MCNC: 9.1 MG/DL (ref 8.7–10.3)
CHLORIDE SERPL-SCNC: 100 MMOL/L (ref 96–106)
CHOLEST SERPL-MCNC: 149 MG/DL (ref 100–199)
CO2 SERPL-SCNC: 24 MMOL/L (ref 20–29)
CREAT SERPL-MCNC: 0.81 MG/DL (ref 0.57–1)
CREAT UR-MCNC: 110.9 MG/DL
EGFRCR SERPLBLD CKD-EPI 2021: 78 ML/MIN/1.73
ERYTHROCYTE [DISTWIDTH] IN BLOOD BY AUTOMATED COUNT: 13.1 % (ref 11.7–15.4)
GLUCOSE SERPL-MCNC: 105 MG/DL (ref 70–99)
HBA1C MFR BLD: 6.4 % (ref 4.8–5.6)
HCT VFR BLD AUTO: 37.1 % (ref 34–46.6)
HDLC SERPL-MCNC: 26 MG/DL
HGB BLD-MCNC: 11.6 G/DL (ref 11.1–15.9)
LDLC SERPL CALC-MCNC: 99 MG/DL (ref 0–99)
LDLC/HDLC SERPL: 3.8 RATIO (ref 0–3.2)
MCH RBC QN AUTO: 27.2 PG (ref 26.6–33)
MCHC RBC AUTO-ENTMCNC: 31.3 G/DL (ref 31.5–35.7)
MCV RBC AUTO: 87 FL (ref 79–97)
MICROALBUMIN UR-MCNC: <3 UG/ML
PLATELET # BLD AUTO: 468 X10E3/UL (ref 150–450)
POTASSIUM SERPL-SCNC: 4 MMOL/L (ref 3.5–5.2)
RBC # BLD AUTO: 4.27 X10E6/UL (ref 3.77–5.28)
SODIUM SERPL-SCNC: 141 MMOL/L (ref 134–144)
TRIGL SERPL-MCNC: 133 MG/DL (ref 0–149)
VIT B12 SERPL-MCNC: 1219 PG/ML (ref 232–1245)
VLDLC SERPL CALC-MCNC: 24 MG/DL (ref 5–40)
WBC # BLD AUTO: 9.8 X10E3/UL (ref 3.4–10.8)

## 2024-04-18 DIAGNOSIS — I10 ESSENTIAL HYPERTENSION: ICD-10-CM

## 2024-04-18 RX ORDER — LOSARTAN POTASSIUM 100 MG/1
TABLET ORAL
Qty: 90 TABLET | Refills: 0 | Status: SHIPPED | OUTPATIENT
Start: 2024-04-18

## 2024-04-24 ENCOUNTER — OFFICE VISIT (OUTPATIENT)
Dept: FAMILY MEDICINE CLINIC | Facility: CLINIC | Age: 74
End: 2024-04-24
Payer: MEDICARE

## 2024-04-24 VITALS
HEART RATE: 96 BPM | RESPIRATION RATE: 15 BRPM | OXYGEN SATURATION: 100 % | WEIGHT: 198 LBS | HEIGHT: 65 IN | TEMPERATURE: 98.2 F | SYSTOLIC BLOOD PRESSURE: 120 MMHG | DIASTOLIC BLOOD PRESSURE: 80 MMHG | BODY MASS INDEX: 32.99 KG/M2

## 2024-04-24 DIAGNOSIS — I10 ESSENTIAL HYPERTENSION: ICD-10-CM

## 2024-04-24 DIAGNOSIS — E03.9 ACQUIRED HYPOTHYROIDISM: ICD-10-CM

## 2024-04-24 DIAGNOSIS — E78.00 HYPERCHOLESTEROLEMIA: ICD-10-CM

## 2024-04-24 DIAGNOSIS — E11.65 TYPE 2 DIABETES MELLITUS WITH HYPERGLYCEMIA, WITHOUT LONG-TERM CURRENT USE OF INSULIN: Primary | ICD-10-CM

## 2024-04-24 PROCEDURE — 3044F HG A1C LEVEL LT 7.0%: CPT | Performed by: PHYSICIAN ASSISTANT

## 2024-04-24 PROCEDURE — 3079F DIAST BP 80-89 MM HG: CPT | Performed by: PHYSICIAN ASSISTANT

## 2024-04-24 PROCEDURE — 99214 OFFICE O/P EST MOD 30 MIN: CPT | Performed by: PHYSICIAN ASSISTANT

## 2024-04-24 PROCEDURE — 3074F SYST BP LT 130 MM HG: CPT | Performed by: PHYSICIAN ASSISTANT

## 2024-04-24 NOTE — PROGRESS NOTES
"Chief Complaint  Hypothyroidism (6 month f/u ), Hypertension (Management), and Diabetes (Management)    Subjective          Hilaria Klein presents to [facility]  History of Present Illness  Hilaria is a 74-year-old female who presents with sister, for, at office visit today for type 2 diabetes, hypertension and hypothyroidism management.  She has lost 5 pounds since last office in March 2024.  States her diet has been improving.  She is been eating yogurt, applesauce and some protein.  Sleep has been slightly restless due to some night sweats.  Bowel movements have been normal without dark black tarry stools.  Has been compliant with her medications.  Denied any current chest pain, wheezing, fevers, chills, abdominal pain, GI upset or swelling of ankles.  She has been taking her Eliquis medication for her A-fib.  States she has a follow-up appointment with cardiology.  Will be having an in facility sleep study in a few weeks.       Objective   Vital Signs:   /80 (BP Location: Left arm, Patient Position: Sitting, Cuff Size: Adult)   Pulse 96   Temp 98.2 °F (36.8 °C)   Resp 15   Ht 165.1 cm (65\")   Wt 89.8 kg (198 lb)   SpO2 100%   BMI 32.95 kg/m²     Physical Exam  Vitals and nursing note reviewed.   Constitutional:       Appearance: Normal appearance. She is well-developed and well-groomed. She is obese.      Comments: Sister is in exam room   HENT:      Head: Normocephalic and atraumatic.   Neck:      Thyroid: No thyroid mass, thyromegaly or thyroid tenderness.      Vascular: No carotid bruit.      Trachea: Trachea and phonation normal. No tracheal tenderness.   Cardiovascular:      Rate and Rhythm: Normal rate and regular rhythm.      Pulses: Normal pulses.      Heart sounds: Normal heart sounds, S1 normal and S2 normal. No murmur heard.  Pulmonary:      Effort: Pulmonary effort is normal.      Breath sounds: Normal breath sounds and air entry.   Abdominal:      " General: Bowel sounds are normal.      Palpations: Abdomen is soft. There is no hepatomegaly.      Tenderness: There is no abdominal tenderness.   Musculoskeletal:      Cervical back: Neck supple.   Skin:     General: Skin is warm and dry.      Capillary Refill: Capillary refill takes less than 2 seconds.   Neurological:      Mental Status: She is alert and oriented to person, place, and time.   Psychiatric:         Attention and Perception: Attention and perception normal.         Mood and Affect: Mood and affect normal.         Speech: Speech normal.         Behavior: Behavior normal. Behavior is cooperative.         Thought Content: Thought content normal.         Cognition and Memory: Cognition and memory normal.         Judgment: Judgment normal.        Result Review :         No visits with results within 2 Week(s) from this visit.   Latest known visit with results is:   Office Visit on 03/28/2024   Component Date Value Ref Range Status    WBC 03/28/2024 9.8  3.4 - 10.8 x10E3/uL Final    RBC 03/28/2024 4.27  3.77 - 5.28 x10E6/uL Final    Hemoglobin 03/28/2024 11.6  11.1 - 15.9 g/dL Final    Hematocrit 03/28/2024 37.1  34.0 - 46.6 % Final    MCV 03/28/2024 87  79 - 97 fL Final    MCH 03/28/2024 27.2  26.6 - 33.0 pg Final    MCHC 03/28/2024 31.3 (L)  31.5 - 35.7 g/dL Final    RDW 03/28/2024 13.1  11.7 - 15.4 % Final    Platelets 03/28/2024 468 (H)  150 - 450 x10E3/uL Final    Glucose 03/28/2024 105 (H)  70 - 99 mg/dL Final    BUN 03/28/2024 14  8 - 27 mg/dL Final    Creatinine 03/28/2024 0.81  0.57 - 1.00 mg/dL Final    EGFR Result 03/28/2024 78  >59 mL/min/1.73 Final    BUN/Creatinine Ratio 03/28/2024 17  12 - 28 Final    Sodium 03/28/2024 141  134 - 144 mmol/L Final    Potassium 03/28/2024 4.0  3.5 - 5.2 mmol/L Final    Chloride 03/28/2024 100  96 - 106 mmol/L Final    Total CO2 03/28/2024 24  20 - 29 mmol/L Final    Calcium 03/28/2024 9.1  8.7 - 10.3 mg/dL Final    Hemoglobin A1C 03/28/2024 6.4 (H)  4.8 - 5.6  % Final    Comment:          Prediabetes: 5.7 - 6.4           Diabetes: >6.4           Glycemic control for adults with diabetes: <7.0      Total Cholesterol 03/28/2024 149  100 - 199 mg/dL Final    Triglycerides 03/28/2024 133  0 - 149 mg/dL Final    HDL Cholesterol 03/28/2024 26 (L)  >39 mg/dL Final    VLDL Cholesterol Eliecer 03/28/2024 24  5 - 40 mg/dL Final    LDL Chol Calc (NIH) 03/28/2024 99  0 - 99 mg/dL Final    LDL/HDL RATIO 03/28/2024 3.8 (H)  0.0 - 3.2 ratio Final    Comment:                                     LDL/HDL Ratio                                              Men  Women                                1/2 Avg.Risk  1.0    1.5                                    Avg.Risk  3.6    3.2                                 2X Avg.Risk  6.2    5.0                                 3X Avg.Risk  8.0    6.1      Creatinine, Urine 03/28/2024 110.9  Not Estab. mg/dL Final    Microalbumin, Urine 03/28/2024 <3.0  Not Estab. ug/mL Final    Microalbumin/Creatinine Ratio 03/28/2024 <3  0 - 29 mg/g creat Final    Comment:                        Normal:                0 -  29                         Moderately increased: 30 - 300                         Severely increased:       >300      25 Hydroxy, Vitamin D 03/28/2024 86.0  30.0 - 100.0 ng/mL Final    Comment: Vitamin D deficiency has been defined by the [place] of  Medicine and an Endocrine Society practice guideline as a  level of serum 25-OH vitamin D less than 20 ng/mL (1,2).  The Endocrine Society went on to further define vitamin D  insufficiency as a level between 21 and 29 ng/mL (2).  1. IOM ([place] of [employer]). 2010. Dietary reference     intakes for calcium and D. [place]: The     National Academies Press.  2. Dexter MF, Dagoberto NC, Tevin PRITCHARD, et al.     Evaluation, treatment, and prevention of vitamin D     deficiency: an Endocrine Society clinical practice     guideline. JCEM. 2011 Jul; 96(7):1911-30.      Vitamin B-12 03/28/2024 1,219   232 - 1,245 pg/mL Final     Advance Care Planning   ACP discussion was held with the patient during this visit. Patient does not have an advance directive, declines further assistance.                        Assessment and Plan    Diagnoses and all orders for this visit:    1. Type 2 diabetes mellitus with hyperglycemia, without long-term current use of insulin (Primary)    2. Hypercholesterolemia    3. Essential hypertension    4. Acquired hypothyroidism    1.  Chronic and stable type 2 diabetes with hyperglycemia without insulin and hypercholesterolemia: Have reviewed her blood work from March 28, 2024.  A1c was 6.4 and stable.  Hilaria will continue her current Pravachol medication at home.  She will schedule a follow-up appointment in next months for Medicare wellness exam with reevaluation.  2.  Chronic and stable hypertension: I have rechecked blood pressure and got 120/80.  She continue her current losartan and Toprol medication at home as directed.  Keep follow-up appointments with cardiology.  3.  Chronic and stable hypothyroidism: Her TSH that was collected in February 2024 was in therapeutic range.  She will continue her current thyroid medication at home as directed.    I spent 22 minutes caring for Hilaria on this date of service. This time includes time spent by me in the following activities:preparing for the visit, reviewing tests, obtaining and/or reviewing a separately obtained history, performing a medically appropriate examination and/or evaluation , counseling and educating the patient/family/caregiver, and documenting information in the medical record  Follow Up   Return in about 6 months (around 10/24/2024), or labs prior DM, for Medicare Wellness.  Patient was given instructions and counseling regarding her condition or for health maintenance advice. Please see specific information pulled into the AVS if appropriate.     NICKIE [facility]  [facility]  [address] [facility] [address]  Dept: [phone]  Dept Fax: [phone]  Loc: [phone]  Loc Fax: [phone]

## 2024-05-08 ENCOUNTER — HOSPITAL ENCOUNTER (OUTPATIENT)
Dept: SLEEP MEDICINE | Facility: HOSPITAL | Age: 74
End: 2024-05-08
Payer: MEDICARE

## 2024-05-08 DIAGNOSIS — Z91.89 AT RISK FOR EXTREME OBESITY WITH ALVEOLAR HYPOVENTILATION: ICD-10-CM

## 2024-05-08 DIAGNOSIS — G47.34 SLEEP RELATED HYPOXIA: ICD-10-CM

## 2024-05-08 DIAGNOSIS — G47.33 SEVERE OBSTRUCTIVE SLEEP APNEA: ICD-10-CM

## 2024-05-08 DIAGNOSIS — R63.5 WEIGHT GAIN: ICD-10-CM

## 2024-05-08 DIAGNOSIS — E66.9 CLASS 1 OBESITY WITH SERIOUS COMORBIDITY AND BODY MASS INDEX (BMI) OF 33.0 TO 33.9 IN ADULT, UNSPECIFIED OBESITY TYPE: ICD-10-CM

## 2024-05-08 PROCEDURE — 95811 POLYSOM 6/>YRS CPAP 4/> PARM: CPT

## 2024-05-08 PROCEDURE — 95811 POLYSOM 6/>YRS CPAP 4/> PARM: CPT | Performed by: FAMILY MEDICINE

## 2024-05-09 DIAGNOSIS — I48.91 ATRIAL FIBRILLATION, UNSPECIFIED TYPE: ICD-10-CM

## 2024-05-09 DIAGNOSIS — G47.33 OBSTRUCTIVE SLEEP APNEA, ADULT: Primary | ICD-10-CM

## 2024-05-22 ENCOUNTER — TELEPHONE (OUTPATIENT)
Dept: SLEEP MEDICINE | Facility: HOSPITAL | Age: 74
End: 2024-05-22
Payer: MEDICARE

## 2024-05-22 NOTE — TELEPHONE ENCOUNTER
Called patient with results of in lab sleep study. Sending order to for BiPAP to [facility] for a home set up. Compliance visit scheduled.

## 2024-05-30 ENCOUNTER — OFFICE VISIT (OUTPATIENT)
Dept: CARDIOLOGY | Facility: CLINIC | Age: 74
End: 2024-05-30
Payer: MEDICARE

## 2024-05-30 ENCOUNTER — LAB (OUTPATIENT)
Dept: LAB | Facility: HOSPITAL | Age: 74
End: 2024-05-30
Payer: MEDICARE

## 2024-05-30 VITALS
SYSTOLIC BLOOD PRESSURE: 118 MMHG | HEART RATE: 98 BPM | OXYGEN SATURATION: 99 % | BODY MASS INDEX: 33.46 KG/M2 | DIASTOLIC BLOOD PRESSURE: 70 MMHG | HEIGHT: 64 IN | WEIGHT: 196 LBS

## 2024-05-30 DIAGNOSIS — I10 ESSENTIAL HYPERTENSION: ICD-10-CM

## 2024-05-30 DIAGNOSIS — E78.00 HYPERCHOLESTEROLEMIA: Primary | ICD-10-CM

## 2024-05-30 DIAGNOSIS — I48.0 PAROXYSMAL ATRIAL FIBRILLATION: ICD-10-CM

## 2024-05-30 LAB
T4 FREE SERPL-MCNC: 1.85 NG/DL (ref 0.92–1.68)
TSH SERPL DL<=0.05 MIU/L-ACNC: 0.34 UIU/ML (ref 0.27–4.2)

## 2024-05-30 PROCEDURE — 3078F DIAST BP <80 MM HG: CPT | Performed by: INTERNAL MEDICINE

## 2024-05-30 PROCEDURE — 36415 COLL VENOUS BLD VENIPUNCTURE: CPT

## 2024-05-30 PROCEDURE — 84439 ASSAY OF FREE THYROXINE: CPT

## 2024-05-30 PROCEDURE — 99214 OFFICE O/P EST MOD 30 MIN: CPT | Performed by: INTERNAL MEDICINE

## 2024-05-30 PROCEDURE — 84443 ASSAY THYROID STIM HORMONE: CPT

## 2024-05-30 PROCEDURE — 93000 ELECTROCARDIOGRAM COMPLETE: CPT | Performed by: INTERNAL MEDICINE

## 2024-05-30 PROCEDURE — 3074F SYST BP LT 130 MM HG: CPT | Performed by: INTERNAL MEDICINE

## 2024-05-30 RX ORDER — METOPROLOL SUCCINATE 25 MG/1
25 TABLET, EXTENDED RELEASE ORAL 2 TIMES DAILY
Qty: 180 TABLET | Refills: 3 | Status: SHIPPED | OUTPATIENT
Start: 2024-05-30

## 2024-05-30 RX ORDER — TELMISARTAN 20 MG/1
20 TABLET ORAL DAILY
Qty: 90 TABLET | Refills: 3 | Status: SHIPPED | OUTPATIENT
Start: 2024-05-30

## 2024-05-30 NOTE — PROGRESS NOTES
PATIENTINFORMATION    Date of Office Visit: 2024  Encounter Provider: Kunal Wan MD  Place of Service: [facility] CARDIOLOGY  Patient Name: Hilaria Klein  : 1950    Subjective:     Encounter Date:2024      Patient ID: Hilaria Klein is a 74 y.o. female.    Chief Complaint   Patient presents with   • Chest Pain     HPI  Ms. Klein is a pleasant 74 years old lady who came to cardiology clinic for follow-up visit.  She still reports daytime fatigue but much improved since prior visit.  She has mild exertional shortness of breath.  She denies any recent palpitations, rest or exertional chest pain, orthopnea, PND, presyncope or syncope or extremity swelling.  Compliant with medications without side effects.  No known bleeding from any site.       ROS  All systems reviewed and negative except as noted in HPI.    Past Medical History:   Diagnosis Date   • Atrial fibrillation    • Colon polyp    • GERD (gastroesophageal reflux disease)    • Goiter    • Hypertension    • Shoulder dislocation, right, initial encounter    • Skin cancer     nose       Past Surgical History:   Procedure Laterality Date   • APPENDECTOMY     • COLONOSCOPY N/A 10/30/2017    Procedure: COLONOSCOPY; POLYPECTOMY;  Surgeon: Effie Harry MD;  Location: [facility];  Service:    • COLONOSCOPY W/ BIOPSIES AND POLYPECTOMY     • ENDOSCOPY N/A 10/30/2017    Procedure: ESOPHAGOGASTRODUODENOSCOPY WITH BIOPSY;  Surgeon: Effie Harry MD;  Location: [facility] OR;  Service:    • KNEE ARTHROPLASTY Left        Social History     Socioeconomic History   • Marital status: Single   Tobacco Use   • Smoking status: Never   • Smokeless tobacco: Never   Vaping Use   • Vaping status: Never Used   Substance and Sexual Activity   • Alcohol use: No   • Drug use: No   • Sexual activity: Defer       Family History   Problem Relation Age of Onset   • Hypertension Other    • Deep vein thrombosis Other    • Breast cancer Neg Hx   "          ECG 12 Lead    Date/Time: 5/30/2024 10:46 AM  Performed by: Kunal Wan MD    Authorized by: Kunal Wan MD  Comparison: compared with previous ECG from 3/24/2024  Comparison to previous ECG: Sinus tach new since last visit   Rhythm: sinus tachycardia  Rate: normal  Conduction: conduction normal  ST Segments: ST segments normal  T Waves: T waves normal  QRS axis: normal  Other: no other findings    Clinical impression: abnormal EKG           Objective:     /70   Pulse 98   Ht 162.6 cm (64\")   Wt 88.9 kg (196 lb)   LMP  (LMP Unknown)   SpO2 99%   BMI 33.64 kg/m²  Body mass index is 33.64 kg/m².     Constitutional:       General: Not in acute distress.     Appearance: Well-developed. Not diaphoretic.   Eyes:      Pupils: Pupils are equal, round, and reactive to light.   HENT:      Head: Normocephalic and atraumatic.   Neck:      Thyroid: No thyromegaly.   Pulmonary:      Effort: Pulmonary effort is normal. No respiratory distress.      Breath sounds: Normal breath sounds. No wheezing. No rales.   Chest:      Chest wall: Not tender to palpatation.   Cardiovascular:      Tachycardia present. Regular rhythm.      No gallop.    Pulses:     Intact distal pulses.   Edema:     Peripheral edema absent.   Abdominal:      General: Bowel sounds are normal. There is no distension.      Palpations: Abdomen is soft.      Tenderness: There is no guarding.   Musculoskeletal: Normal range of motion.         General: No deformity.      Cervical back: Normal range of motion and neck supple. Skin:     General: Skin is warm and dry.      Findings: No rash.   Neurological:      Mental Status: Alert and oriented to person, place, and time.      Cranial Nerves: No cranial nerve deficit.      Deep Tendon Reflexes: Reflexes are normal and symmetric.   Psychiatric:         Judgment: Judgment normal.       Review Of Data: I have reviewed pertinent recent labs, images and documents and pertinent findings " included in HPI or assessment below.    Lipid Panel          10/5/2023    10:06 3/28/2024    08:17   Lipid Panel   Total Cholesterol 240  149    Triglycerides 140  133    HDL Cholesterol 43  26    VLDL Cholesterol 26  24    LDL Cholesterol  171  99    LDL/HDL Ratio 3.93  3.8          Assessment/Plan:         Paroxysmal atrial fibrillation-symptomatic with palpitations.  48-hour Holter in March 2024 revealed A-fib burden of 43.5%.  On metoprolol and Eliquis  Essential hypertension on metoprolol and losartan-excellent control  Hypercholesterolemia on pravastatin-she used to be on simvastatin.  Fair LDL.  May switch to Crestor or Lipitor during follow-up.  Obstructive sleep apnea-pending delivery of BiPAP machine.  Hypothyroidism on replacement therapy     Mrs Klein reports feeling better since last visit.   Elevated resting heart rate noted during exam.  She was in sinus tachycardia.  Denies new symptoms.  No evidence for infection.  Check thyroid function test.  I have increased metoprolol and switched losartan to telmisartan(helps in paroxysmal A-fib.)   She will come in 2 weeks with blood pressure logs and adjust medications as needed(may increase telmisartan)     Diagnosis and plan of care discussed with patient and verbalized understanding.            Your medication list            Accurate as of May 30, 2024 11:03 AM. If you have any questions, ask your nurse or doctor.                START taking these medications        Instructions Last Dose Given Next Dose Due   telmisartan 20 MG tablet  Commonly known as: MICARDIS  Started by: Kunal Wan MD      Take 1 tablet by mouth Daily.              CHANGE how you take these medications        Instructions Last Dose Given Next Dose Due   metoprolol succinate XL 25 MG 24 hr tablet  Commonly known as: TOPROL-XL  What changed: when to take this  Changed by: Kunal Wan MD      Take 1 tablet by mouth 2 (Two) Times a Day.              CONTINUE taking  these medications        Instructions Last Dose Given Next Dose Due   apixaban 5 MG tablet tablet  Commonly known as: ELIQUIS      Take 1 tablet by mouth 2 (Two) Times a Day.       ascorbic acid 500 MG tablet  Commonly known as: VITAMIN C      Take 2 tablets by mouth Daily.       levothyroxine 125 MCG tablet  Commonly known as: Synthroid      Take 1 tablet by mouth Daily.       omeprazole 40 MG capsule  Commonly known as: priLOSEC      TAKE 1 CAPSULE BY MOUTH DAILY       pravastatin 20 MG tablet  Commonly known as: PRAVACHOL      TAKE 1 TABLET BY MOUTH DAILY       vitamin D3 125 MCG (5000 UT) capsule capsule      Take 1 capsule by mouth Daily.       zinc sulfate 220 (50 Zn) MG capsule  Commonly known as: ZINCATE      Take 50 mg by mouth Daily.              STOP taking these medications      losartan 100 MG tablet  Commonly known as: COZAAR  Stopped by: Kunal Wan MD                  Where to Get Your Medications        These medications were sent to [id number] [place] [address] [facility] [address]  - [phone]  - [phone] [address] [facility] [address]      Phone: [phone]   metoprolol succinate XL 25 MG 24 hr tablet  telmisartan 20 MG tablet             Kunal Wan MD  05/30/24  11:03 EDT

## 2024-06-03 DIAGNOSIS — I48.0 PAROXYSMAL ATRIAL FIBRILLATION: ICD-10-CM

## 2024-06-04 RX ORDER — APIXABAN 5 MG/1
5 TABLET, FILM COATED ORAL 2 TIMES DAILY
Qty: 60 TABLET | Refills: 2 | Status: SHIPPED | OUTPATIENT
Start: 2024-06-04

## 2024-06-05 DIAGNOSIS — E03.9 ACQUIRED HYPOTHYROIDISM: ICD-10-CM

## 2024-06-05 RX ORDER — LEVOTHYROXINE SODIUM 112 UG/1
112 TABLET ORAL DAILY
Qty: 30 TABLET | Refills: 3 | Status: SHIPPED | OUTPATIENT
Start: 2024-06-05

## 2024-06-06 ENCOUNTER — TELEPHONE (OUTPATIENT)
Dept: CARDIOLOGY | Facility: CLINIC | Age: 74
End: 2024-06-06
Payer: MEDICARE

## 2024-06-06 NOTE — TELEPHONE ENCOUNTER
----- Message from Kunal Wan sent at 6/5/2024  8:09 PM EDT -----  Please advise Hilaria that most recent blood work shows slightly elevated thyroid hormone levels likely contributing to fast heart rate.  I have called in prescription for reduced levothyroxine but I want her to follow-up with her PCP Tennille for follow-up and adjustment.  I have copied Tennille.  Thank you

## 2024-06-06 NOTE — TELEPHONE ENCOUNTER
Results and recommendations called to pt.  Instructed to call with any further questions or concerns.  Verbalized understanding.  Pt states that she will follow up with PCP and start decreased levothyroxine dose as recommended.    Vida Aceves RN  Triage Nurse, [facility]  06/06/24 09:02 EDT

## 2024-06-06 NOTE — PROGRESS NOTES
Please advise Hilaria that most recent blood work shows slightly elevated thyroid hormone levels likely contributing to fast heart rate.  I have called in prescription for reduced levothyroxine but I want her to follow-up with her PCP Tennille for follow-up and adjustment.  I have copied Tennille.  Thank you

## 2024-06-13 ENCOUNTER — OFFICE VISIT (OUTPATIENT)
Dept: CARDIOLOGY | Facility: CLINIC | Age: 74
End: 2024-06-13
Payer: MEDICARE

## 2024-06-13 VITALS
HEART RATE: 80 BPM | BODY MASS INDEX: 33.73 KG/M2 | OXYGEN SATURATION: 97 % | RESPIRATION RATE: 16 BRPM | SYSTOLIC BLOOD PRESSURE: 122 MMHG | DIASTOLIC BLOOD PRESSURE: 74 MMHG | WEIGHT: 197.6 LBS | HEIGHT: 64 IN

## 2024-06-13 DIAGNOSIS — R00.2 PALPITATIONS: Primary | ICD-10-CM

## 2024-06-13 DIAGNOSIS — R06.02 EXERTIONAL SHORTNESS OF BREATH: ICD-10-CM

## 2024-06-13 DIAGNOSIS — G47.33 OSA (OBSTRUCTIVE SLEEP APNEA): ICD-10-CM

## 2024-06-13 NOTE — PROGRESS NOTES
CARDIOLOGY        Patient Name: Hilaria Klein  :1950  Age: 74 y.o.  Primary Cardiologist: Kunal Wan MD  Encounter Provider:  Farooq Sanchez PA-C    Date of Service: 24        CHIEF COMPLAINT / REASON FOR OFFICE VISIT     2-week follow-up      HISTORY OF PRESENT ILLNESS       HPI  Hilaria Klein is a 74 y.o. female who presents today for 2-week follow-up.     Pt has a  history significant for chest pain, palpitations, hypercholesterolemia, HTN, and obesity presents for hospital follow up. PT was initially seen in office on 3-4-24 for palpitations, fatigue, SOA, and chest pain. Pt was sent for lexiscan and echocardiogram along with 48 hour holter. Pt had a home sleep study ordered as well. Pt lexiscan and echo were normal. Pt holter showed atrial fibrillation. Pt was called in Elqiuis 5 mg BID.      Pt went to ER on 3-20-24 sometime after her stress test. Pt felt like she had a fever at that time. Pt had EKG's and serial trended troponin's. Pt had a negative CTA of chest for PE but did show some mediastinal lymph nodes. Cardiology was consulted and recommended follow up.     On 3-25-24 she continued to be fatigue. Pt reports still having a cough and decrease appetite. Pt has not had any chest pain, SOA, palpitations, swelling to legs, or any issues with bleeding or bruising. Pt has not been checking her blood pressure.     Patient followed up on 2024 for fatigue was better.  She was noted to have sinus tachycardia and was switched from losartan toTelmisartan.  Her metoprolol was increased.  She was to follow-up in 2 weeks for blood pressure check and medication adjustment.    Patient doing well since her visit 2 weeks ago.  Patient feels like her energy is better.  Patient has not been logging her blood pressures due to still trying to get a machine.  Patient denies any chest pain, shortness of breath, palpitations, lightheadedness, edema to her legs, orthopnea.  Patient  "received her CPAP machine a week ago and has been using it and has felt better on it.    The following portions of the patient's history were reviewed and updated as appropriate: allergies, current medications, past family history, past medical history, past social history, past surgical history and problem list.      VITAL SIGNS     Visit Vitals  /74 (BP Location: Left arm, Patient Position: Sitting, Cuff Size: Large Adult)   Pulse 80   Resp 16   Ht 162.6 cm (64\")   Wt 89.6 kg (197 lb 9.6 oz)   LMP  (LMP Unknown)   SpO2 97%   BMI 33.92 kg/m²       @[email or web address]  Wt Readings from Last 3 Encounters:   06/13/24 89.6 kg (197 lb 9.6 oz)   05/30/24 88.9 kg (196 lb)   04/24/24 89.8 kg (198 lb)     Body mass index is 33.92 kg/m².        PHYSICAL EXAMINATION     Constitutional:       General: Awake. Not in acute distress.     Appearance: Not in distress.   Pulmonary:      Effort: Pulmonary effort is normal.      Breath sounds: Normal breath sounds.   Cardiovascular:      Normal rate. Regular rhythm.      Murmurs: There is no murmur.   Skin:     General: Skin is warm.   Neurological:      Mental Status: Alert.   Psychiatric:         Behavior: Behavior is cooperative.           REVIEWED DATA     Procedures    Cardiac Procedures:      Sleep study     Snoring summary:   There were a total of 457 episodes of snoring which translates to 81% of monitored time.     Comments: Patient has not been evaluated by sleep medicine.  Study ordered by her cardiologist.  There was severe range sleep disordered breathing events throughout night of out of center sleep testing often in relation to sustained oxygen desaturations.     Diagnosis:  Severe obstructive sleep apnea. G 47.33.   Sleep related hypoxia, G 47.36      Recommendations:   Schedule the patient for new patient visit (30 minute Time Slot with the Sleep Physician): For evaluation in order to determine the best approach towards the appropriate therapy.        Stress " test on 3-20-24  Interpretation Summary          Myocardial perfusion imaging indicates a normal myocardial perfusion study with no evidence of ischemia. Impressions are consistent with a low risk study.    Left ventricular ejection fraction is hyperdynamic (Calculated EF > 70%).     Transthoracic echo on 3-20-24  Interpretation Summary          Left ventricular systolic function is hyperdynamic (EF > 70%). Calculated left ventricular EF = 72.2% Normal left ventricular cavity size and wall thickness noted. All left ventricular wall segments contract normally. Left ventricular diastolic function was normal.    Limited 2D imaging of cardiac valves     Holter monitor 3-7-24  Interpretation Summary          An abnormal monitor study.    Patient went into A-fib about 40 minutes inot wearing the monitor.  Total A-fib burden was 43.5%.  She was in sinus rhythm at termination of study.    Lipid Panel          10/5/2023    10:06 3/28/2024    08:17   Lipid Panel   Total Cholesterol 240  149    Triglycerides 140  133    HDL Cholesterol 43  26    VLDL Cholesterol 26  24    LDL Cholesterol  171  99    LDL/HDL Ratio 3.93  3.8        Lab Results   Component Value Date     03/28/2024     (L) 03/20/2024    K 4.0 03/28/2024    K 3.5 03/20/2024     03/28/2024    CL 98 03/20/2024    CO2 24 03/28/2024    CO2 23.1 03/20/2024    BUN 14 03/28/2024    BUN 6 (L) 03/20/2024    CREATININE 0.81 03/28/2024    CREATININE 0.83 03/20/2024    EGFRIFNONA 69 09/09/2021    EGFRIFNONA 67 04/19/2021    EGFRIFAFRI 84 09/09/2021    EGFRIFAFRI 81 04/19/2021    GLUCOSE 105 (H) 03/28/2024    GLUCOSE 152 (H) 03/20/2024    CALCIUM 9.1 03/28/2024    CALCIUM 8.5 (L) 03/20/2024    PROTENTOTREF 6.7 10/05/2023    PROTENTOTREF 6.8 04/03/2023    ALBUMIN 3.6 03/20/2024    ALBUMIN 4.3 10/05/2023    BILITOT 1.1 03/20/2024    BILITOT 0.5 10/05/2023    AST 23 03/20/2024    AST 34 (H) 10/05/2023    ALT 26 03/20/2024    ALT 50 (H) 10/05/2023     Lab Results  "  Component Value Date    WBC 9.8 03/28/2024    WBC 14.02 (H) 03/20/2024    HGB 11.6 03/28/2024    HGB 13.0 03/20/2024    HCT 37.1 03/28/2024    HCT 40.2 03/20/2024    MCV 87 03/28/2024    MCV 86.3 03/20/2024     (H) 03/28/2024     (H) 03/20/2024     No results found for: \"PROBNP\", \"BNP\"  Lab Results   Component Value Date    TROPONINT 26 (H) 03/20/2024     Lab Results   Component Value Date    TSH 0.345 05/30/2024    TSH 0.594 02/23/2024             ASSESSMENT & PLAN     Diagnoses and all orders for this visit:    1. Atypical chest pain (Primary)              Recent negative Lexiscan and echocardiogram.  No palpitations.     2. Paroxysmal atrial fibrillation              Pt is anticoagulated on eliquis. Rate controlled on Toprol xl 25 mg. No bleeding issues. CHADS-VASc score of 4.      3. Essential hypertension              Currently on telmisartan 20 mg and metoprolol XL 25 mg daily.  Blood pressure controlled today in office     4. MILLI (obstructive sleep apnea)              Newly diagnosed. Pt has follow up with Dr. Walker (Sleep medicine).  Patient started on CPAP machine 1 week ago and has felt better being on it.     5. Hypercholesterolemia              Lipids reviewed on 4-3-24. Pt recently started on pravastatin 20 mg.     Patient to continue her current medication therapy.  I will have patient follow-up in 6 months with .  Patient told to call office with any questions or concerns.  Patient to get a blood pressure cuff to monitor blood pressures.  Patient to continue follow-up with  sleep medicine for MILLI.    Return in about 6 months (around 12/13/2024) for Dr. Wan.    Future Appointments         Provider Department Center    8/1/2024 9:00 AM Ovi Wlaker DO [facility] SLEEP MEDICINE     10/17/2024 9:00 [facility]    10/24/2024 10:45 AM Head, TIAN Godinez [facility] PRIMARY " CARE [facility]    12/12/2024 10:15 AM Kunal Wan MD [facility] CARDIOLOGY [facility]                MEDICATIONS         Discharge Medications            Accurate as of June 13, 2024 10:09 AM. If you have any questions, ask your nurse or doctor.                Continue These Medications        Instructions Start Date   ascorbic acid 500 MG tablet  Commonly known as: VITAMIN C   1,000 mg, Oral, Daily      Eliquis 5 MG tablet tablet  Generic drug: apixaban   5 mg, Oral, 2 Times Daily      levothyroxine 112 MCG tablet  Commonly known as: Synthroid   112 mcg, Oral, Daily      metoprolol succinate XL 25 MG 24 hr tablet  Commonly known as: TOPROL-XL   25 mg, Oral, 2 Times Daily      omeprazole 40 MG capsule  Commonly known as: priLOSEC   40 mg, Oral, Daily      pravastatin 20 MG tablet  Commonly known as: PRAVACHOL   20 mg, Oral, Daily      telmisartan 20 MG tablet  Commonly known as: MICARDIS   20 mg, Oral, Daily      vitamin D3 125 MCG (5000 UT) capsule capsule   5,000 Units, Oral, Daily      zinc sulfate 220 (50 Zn) MG capsule  Commonly known as: ZINCATE   50 mg, Oral, Daily                   **Dragon Disclaimer:   Much of this encounter note is an electronic transcription/translation of spoken language to printed text. The electronic translation of spoken language may permit erroneous, or at times, nonsensical words or phrases to be inadvertently transcribed. Although I have reviewed the note for such errors, some may still exist.

## 2024-07-01 DIAGNOSIS — K21.00 GASTROESOPHAGEAL REFLUX DISEASE WITH ESOPHAGITIS WITHOUT HEMORRHAGE: ICD-10-CM

## 2024-07-01 RX ORDER — OMEPRAZOLE 40 MG/1
40 CAPSULE, DELAYED RELEASE ORAL DAILY
Qty: 90 CAPSULE | Refills: 1 | Status: SHIPPED | OUTPATIENT
Start: 2024-07-01

## 2024-07-11 DIAGNOSIS — E78.00 HYPERCHOLESTEROLEMIA: ICD-10-CM

## 2024-07-11 DIAGNOSIS — E11.65 TYPE 2 DIABETES MELLITUS WITH HYPERGLYCEMIA, WITHOUT LONG-TERM CURRENT USE OF INSULIN: ICD-10-CM

## 2024-07-11 RX ORDER — PRAVASTATIN SODIUM 20 MG
20 TABLET ORAL DAILY
Qty: 90 TABLET | Refills: 0 | Status: SHIPPED | OUTPATIENT
Start: 2024-07-11

## 2024-08-01 ENCOUNTER — OFFICE VISIT (OUTPATIENT)
Dept: SLEEP MEDICINE | Facility: HOSPITAL | Age: 74
End: 2024-08-01
Payer: MEDICARE

## 2024-08-01 VITALS
DIASTOLIC BLOOD PRESSURE: 75 MMHG | HEART RATE: 68 BPM | BODY MASS INDEX: 32.99 KG/M2 | WEIGHT: 198 LBS | HEIGHT: 65 IN | SYSTOLIC BLOOD PRESSURE: 145 MMHG | OXYGEN SATURATION: 94 %

## 2024-08-01 DIAGNOSIS — I10 ESSENTIAL HYPERTENSION: ICD-10-CM

## 2024-08-01 DIAGNOSIS — E66.9 CLASS 1 OBESITY WITH SERIOUS COMORBIDITY AND BODY MASS INDEX (BMI) OF 32.0 TO 32.9 IN ADULT, UNSPECIFIED OBESITY TYPE: ICD-10-CM

## 2024-08-01 DIAGNOSIS — G47.33 OBSTRUCTIVE SLEEP APNEA, ADULT: Primary | ICD-10-CM

## 2024-08-01 PROCEDURE — G0463 HOSPITAL OUTPT CLINIC VISIT: HCPCS

## 2024-08-01 NOTE — PROGRESS NOTES
[facility]  [address]  [address] [place] [id number]  Phone   Fax     SLEEP CLINIC FOLLOW UP PROGRESS NOTE.    Hilaria Klein  [id number]   1950  74 y.o.  female      PCP: Tennille Lebron PA-C      Date of visit: 8/1/2024    Chief Complaint   Patient presents with    Sleep Apnea       Medications and allergies are reviewed by me and documented in the encounter.     SOCIAL (habits pertaining to sleep medicine)  History tobacco use:No   History of alcohol use: 0 per week  Caffeine use: 2 beverages/d    HPI:  This is a 74 y.o. PMHx HTN, A-fib, obesity.  Here for management of severe obstructive sleep apnea (DAVID 57.6/h with sleep-related hypoxia on 3/14/2024 HST; s/p PAP titration on 5/8/2024 with oxygenation adequate on PAP started on auto-bilevel. Patient is using positive airway pressure therapy and the symptoms of sleep apnea have improved significantly on the therapy. Normally patient goes to bed at 11 PM and wakes up at 8 AM .  The patient wakes up 2 time(s) during the night and has no problem going back to sleep.  Feels refreshed after waking up.     Overall patient's Impression of their PAP therapy is: Excellent  States she's very surprised by how much better she feels   Sleep more restorative   Yancey 3/24 normal  No issues with FFM - airtouch F20   No air pressure issues     Compliance data as reviewed by me with patient room today:  Date range 6/5/2024 - 7/30/2024  Overall use 100%  4-hour shante 100%  Average days used 7 hours 50 minutes  Device air curve 11 V-auto  Max IPAP 25 cm H2O  Min EPAP 8 cm H2O  Pressure support 4 cm H2O  95th percentile IPAP 13.2 cm H2O  95th percentile EPAP 9.2 cm H2O  AHI 0.7  DME: [employer]  Mask used: FFM - AirTouch F20       -BP in sleep clinic 145/75  States she feels well today  Compliant with home therapies reviewed    -Obesity  Wt Readings from Last 3 Encounters:   08/01/24 89.8 kg (198 lb)   06/13/24 89.6 kg (197  "lb 9.6 oz)   05/30/24 88.9 kg (196 lb)         -Last seen in sleep clinic~4 months ago on 3/28/2024 by me snoring witnessed apneas by multiple individuals.  Had a home sleep study with her cardiologist slept with some discomfort on this night due to lights from device.  Titration was ordered.    REVIEW OF SYSTEMS:   Is negative unless otherwise noted in HPI  [place] Sleepiness Scale :Total score: 3     Disclaimer History: The above history is based on this sleep physician's in room encounter with the patient. Pre encounter self administered questionnaires are taken into consideration and discussed with patient for any discordance. The above documentation by this sleep physician is the most accurate clinical information determined by in room sleep physician encounter with patient.     PHYSICAL EXAMINATION:  Vitals:    08/01/24 0700   BP: 145/75   Pulse: 68   SpO2: 94%   Weight: 89.8 kg (198 lb)   Height: 165.1 cm (65\")    Body mass index is 32.95 kg/m².   CONSTITUTIONAL: Well appearing, in no overt pain or respiratory distress   NOSE: No septal defect  RESP SYSTEM:  No overt respiratory distress, speaks in clear sentences without dyspnea, no accessory muscle use  CARDIOVASULAR: No edema noted  NEURO: Oriented x 3, no gross focal deficits     Compliance data as reviewed by me with patient room today:  Date range 6/5/2024 - 7/30/2024  Overall use 100%  4-hour shante 100%  Average days used 7 hours 50 minutes  Device air curve 11 V-auto  Max IPAP 25 cm H2O  Min EPAP 8 cm H2O  Pressure support 4 cm H2O  95th percentile IPAP 13.2 cm H2O  95th percentile EPAP 9.2 cm H2O  AHI 0.7  DME: [employer]  Mask used: FFM - AirTouch F20     ASSESSMENT AND PLAN:  Obstructive sleep apnea ( G 47.33).    -Specific Changes made by me today:  I. After review of compliance data, in visit clinical correlation, and through shared decision making: will not make any changes to PAP therapy settings**.  II.  Counseled patient to follow-up with me in 1 " year for therapy review.  Counseled may request sooner follow-up to sleep clinic anytime the patient feels necessary.  The symptoms of sleep apnea have improved with the device and the treatment.  Patient's compliance with the device is excellent for treatment of sleep apnea.  I have independently reviewed the smart card down load and discussed with the patient the download data and encouarged the patient to continue to use the device.The residual AHI is acceptable. The device is benefiting the patient and the device is medically necessary.  Without proper control of sleep apnea and good compliance there is a increased risk for hypertension, diabetes mellitus and nonrestorative sleep with hypersomnia which can increase risk for motor vehicle accidents.  Untreated sleep apnea is also a risk factor for development of atrial fibrillation, pulmonary hypertension, insulin resistance and stroke. The patient is also instructed to get the supplies from the [facility] [employer] [facility] and and change them on a regular basis.  A prescription for supplies has been sent to the [facility] [employer] [facility].  I have also discussed the good sleep hygiene habits and adequate amount of sleep needed for good health.  Obesity, with BMI is Body mass index is 32.95 kg/m².. Counseled weight loss will be beneficial for reduction in severity of sleep apnea, healthy diet/exercise to achieve same, follow up with primary care physician for serial monitoring and to further guide management.  Essential hypertension [I10], Compliant wit home therapies reviewed.  Counseled patient PAP therapy compliance for treatment of obstructive sleep apnea may be beneficial for this comorbid condition.  Follow-up with PCP as previous for hypertension       Follow up in 1 year . Patient's questions were answered.        EMR Dragon/Transcription disclaimer:   Much of this encounter note is an electronic transcription/translation of spoken language to printed text. The electronic translation of  spoken language may permit erroneous, or at times, nonsensical words or phrases to be inadvertently transcribed; Although I have reviewed the note for such errors, some may still exist.       NPI #: [id number]    Ovi Walker, [facility]  08/01/24

## 2024-08-23 DIAGNOSIS — I48.0 PAROXYSMAL ATRIAL FIBRILLATION: ICD-10-CM

## 2024-08-23 DIAGNOSIS — E03.9 ACQUIRED HYPOTHYROIDISM: ICD-10-CM

## 2024-08-23 RX ORDER — LEVOTHYROXINE SODIUM 112 UG/1
112 TABLET ORAL DAILY
Qty: 30 TABLET | Refills: 3 | Status: SHIPPED | OUTPATIENT
Start: 2024-08-23

## 2024-08-23 RX ORDER — APIXABAN 5 MG/1
5 TABLET, FILM COATED ORAL 2 TIMES DAILY
Qty: 60 TABLET | Refills: 2 | Status: SHIPPED | OUTPATIENT
Start: 2024-08-23

## 2024-08-29 ENCOUNTER — TELEPHONE (OUTPATIENT)
Dept: FAMILY MEDICINE CLINIC | Facility: CLINIC | Age: 74
End: 2024-08-29

## 2024-08-29 DIAGNOSIS — E03.9 ACQUIRED HYPOTHYROIDISM: Primary | ICD-10-CM

## 2024-08-31 LAB
T3FREE SERPL-MCNC: 2.5 PG/ML (ref 2–4.4)
T4 FREE SERPL-MCNC: 1.61 NG/DL (ref 0.92–1.68)
TSH SERPL DL<=0.005 MIU/L-ACNC: 2.79 UIU/ML (ref 0.27–4.2)

## 2024-09-06 ENCOUNTER — TELEPHONE (OUTPATIENT)
Dept: FAMILY MEDICINE CLINIC | Facility: CLINIC | Age: 74
End: 2024-09-06

## 2024-09-06 NOTE — TELEPHONE ENCOUNTER
Caller: Hilaria Klein    Relationship: Self    Best call back number: [phone]     Which medication are you concerned about: levothyroxine (SYNTHROID, LEVOTHROID) 112 MCG tablet     Who prescribed you this medication: LEE [facility]ACK AND HER HEART DOCTOR    What are your concerns: PATIENT STATED JANEEN KEEPS SENDING HER MESSAGES STATING THEY ARE WAITING ON AUTHORIZATION BEFORE HER 30 DAY PRESCRIPTION FOR THIS MEDICATION MAY BE FILLED.    PATIENT IS REQUESTING THIS AUTHORIZATION BE GIVEN SO SHE MAY  THIS MEDICATION.    PATIENT STATED SHE NORMALLY RECEIVES A 90 DAY SUPPLY, AND SHE IS NOT SURE WHY THIS IS BEING REQUESTED FOR 30 DAY SUPPLY.

## 2024-09-06 NOTE — TELEPHONE ENCOUNTER
Hub to share     Left pt v/m that her cardiologist refilled this on 8/23 with 30 tablets and 3 refills, it there is a problem filling it she needs to contact his office.

## 2024-09-10 DIAGNOSIS — E03.9 ACQUIRED HYPOTHYROIDISM: ICD-10-CM

## 2024-09-10 RX ORDER — LEVOTHYROXINE SODIUM 112 UG/1
112 TABLET ORAL DAILY
Qty: 30 TABLET | Refills: 3 | Status: SHIPPED | OUTPATIENT
Start: 2024-09-10

## 2024-10-09 DIAGNOSIS — E55.9 VITAMIN D INSUFFICIENCY: ICD-10-CM

## 2024-10-09 DIAGNOSIS — I10 ESSENTIAL HYPERTENSION: ICD-10-CM

## 2024-10-09 DIAGNOSIS — E78.00 HYPERCHOLESTEROLEMIA: ICD-10-CM

## 2024-10-09 DIAGNOSIS — R53.82 CHRONIC FATIGUE: ICD-10-CM

## 2024-10-09 DIAGNOSIS — E03.9 ACQUIRED HYPOTHYROIDISM: ICD-10-CM

## 2024-10-09 DIAGNOSIS — E11.65 TYPE 2 DIABETES MELLITUS WITH HYPERGLYCEMIA, WITHOUT LONG-TERM CURRENT USE OF INSULIN: Primary | ICD-10-CM

## 2024-10-15 DIAGNOSIS — E11.65 TYPE 2 DIABETES MELLITUS WITH HYPERGLYCEMIA, WITHOUT LONG-TERM CURRENT USE OF INSULIN: ICD-10-CM

## 2024-10-15 DIAGNOSIS — E78.00 HYPERCHOLESTEROLEMIA: ICD-10-CM

## 2024-10-15 RX ORDER — PRAVASTATIN SODIUM 20 MG
20 TABLET ORAL DAILY
Qty: 90 TABLET | Refills: 0 | Status: SHIPPED | OUTPATIENT
Start: 2024-10-15

## 2024-10-24 ENCOUNTER — OFFICE VISIT (OUTPATIENT)
Dept: FAMILY MEDICINE CLINIC | Facility: CLINIC | Age: 74
End: 2024-10-24
Payer: MEDICARE

## 2024-10-24 VITALS
TEMPERATURE: 97.5 F | HEART RATE: 80 BPM | OXYGEN SATURATION: 99 % | DIASTOLIC BLOOD PRESSURE: 80 MMHG | HEIGHT: 65 IN | BODY MASS INDEX: 33.66 KG/M2 | SYSTOLIC BLOOD PRESSURE: 120 MMHG | WEIGHT: 202 LBS

## 2024-10-24 DIAGNOSIS — E78.00 HYPERCHOLESTEROLEMIA: ICD-10-CM

## 2024-10-24 DIAGNOSIS — R73.03 PREDIABETES: ICD-10-CM

## 2024-10-24 DIAGNOSIS — E03.9 ACQUIRED HYPOTHYROIDISM: ICD-10-CM

## 2024-10-24 DIAGNOSIS — Z76.89 ENCOUNTER TO ESTABLISH CARE: ICD-10-CM

## 2024-10-24 DIAGNOSIS — I48.91 ATRIAL FIBRILLATION, UNSPECIFIED TYPE: ICD-10-CM

## 2024-10-24 DIAGNOSIS — K21.00 GASTROESOPHAGEAL REFLUX DISEASE WITH ESOPHAGITIS WITHOUT HEMORRHAGE: ICD-10-CM

## 2024-10-24 DIAGNOSIS — Z00.00 MEDICARE ANNUAL WELLNESS VISIT, SUBSEQUENT: Primary | ICD-10-CM

## 2024-10-24 DIAGNOSIS — I10 ESSENTIAL HYPERTENSION: ICD-10-CM

## 2024-10-24 DIAGNOSIS — Z23 NEED FOR INFLUENZA VACCINATION: ICD-10-CM

## 2024-10-24 DIAGNOSIS — G47.33 OSA (OBSTRUCTIVE SLEEP APNEA): ICD-10-CM

## 2024-10-24 PROCEDURE — 3079F DIAST BP 80-89 MM HG: CPT | Performed by: NURSE PRACTITIONER

## 2024-10-24 PROCEDURE — G0008 ADMIN INFLUENZA VIRUS VAC: HCPCS | Performed by: NURSE PRACTITIONER

## 2024-10-24 PROCEDURE — 1159F MED LIST DOCD IN RCRD: CPT | Performed by: NURSE PRACTITIONER

## 2024-10-24 PROCEDURE — 1170F FXNL STATUS ASSESSED: CPT | Performed by: NURSE PRACTITIONER

## 2024-10-24 PROCEDURE — 90662 IIV NO PRSV INCREASED AG IM: CPT | Performed by: NURSE PRACTITIONER

## 2024-10-24 PROCEDURE — 1126F AMNT PAIN NOTED NONE PRSNT: CPT | Performed by: NURSE PRACTITIONER

## 2024-10-24 PROCEDURE — 99214 OFFICE O/P EST MOD 30 MIN: CPT | Performed by: NURSE PRACTITIONER

## 2024-10-24 PROCEDURE — 3074F SYST BP LT 130 MM HG: CPT | Performed by: NURSE PRACTITIONER

## 2024-10-24 PROCEDURE — 1160F RVW MEDS BY RX/DR IN RCRD: CPT | Performed by: NURSE PRACTITIONER

## 2024-10-24 PROCEDURE — G0439 PPPS, SUBSEQ VISIT: HCPCS | Performed by: NURSE PRACTITIONER

## 2024-10-24 PROCEDURE — 3044F HG A1C LEVEL LT 7.0%: CPT | Performed by: NURSE PRACTITIONER

## 2024-10-24 RX ORDER — OMEPRAZOLE 40 MG/1
40 CAPSULE, DELAYED RELEASE ORAL DAILY
Qty: 90 CAPSULE | Refills: 1 | Status: SHIPPED | OUTPATIENT
Start: 2024-10-24

## 2024-10-24 NOTE — PROGRESS NOTES
The ABCs of the Annual Wellness Visit  Subsequent Medicare Wellness Visit    Subjective    Hilaria Klein is a 74 y.o. female who presents for a Subsequent Medicare Wellness Visit.    The following portions of the patient's history were reviewed and   updated as appropriate: allergies, current medications, past family history, past medical history, past social history, past surgical history, and problem list.    Compared to one year ago, the patient feels her physical   health is the same.    Compared to one year ago, the patient feels her mental   health is the same.    Recent Hospitalizations:  She was not admitted to the hospital during the last year.       Current Medical Providers:  Patient Care Team:  Head, TIAN Godinez as PCP - General (Nurse Practitioner)    Outpatient Medications Prior to Visit   Medication Sig Dispense Refill    ascorbic acid (VITAMIN C) 500 MG tablet Take 2 tablets by mouth Daily.      Cholecalciferol (VITAMIN D3) 5000 units capsule capsule Take 1 capsule by mouth Daily.      Eliquis 5 MG tablet tablet TAKE 1 TABLET BY MOUTH TWICE DAILY 60 tablet 2    levothyroxine (SYNTHROID, LEVOTHROID) 112 MCG tablet TAKE 1 TABLET BY MOUTH DAILY 30 tablet 3    metoprolol succinate XL (TOPROL-XL) 25 MG 24 hr tablet Take 1 tablet by mouth 2 (Two) Times a Day. 180 tablet 3    pravastatin (PRAVACHOL) 20 MG tablet TAKE 1 TABLET BY MOUTH DAILY 90 tablet 0    telmisartan (MICARDIS) 20 MG tablet Take 1 tablet by mouth Daily. 90 tablet 3    zinc sulfate (ZINCATE) 220 (50 Zn) MG capsule Take 50 mg by mouth Daily.      omeprazole (priLOSEC) 40 MG capsule Take 1 capsule by mouth Daily. 90 capsule 1     No facility-administered medications prior to visit.       No opioid medication identified on active medication list. I have reviewed chart for other potential  high risk medication/s and harmful drug interactions in the elderly.        Aspirin is not on active medication list.  Aspirin use is not indicated based on  "review of current medical condition/s. Risk of harm outweighs potential benefits.  .    Patient Active Problem List   Diagnosis    Gastroesophageal reflux disease with esophagitis    Hypercholesterolemia    Acquired hypothyroidism    Pruritus of vagina    Vitamin D deficiency    Medicare annual wellness visit, subsequent    Pap smear, as part of routine gynecological examination    Irregular heart beat    Screening mammogram, encounter for    Post-menopausal    Encounter for screening for malignant neoplasm of colon    Hyperplastic polyp of sigmoid colon    Essential hypertension    Osteopenia of neck of left femur    Class 1 obesity due to excess calories with serious comorbidity and body mass index (BMI) of 34.0 to 34.9 in adult    Chronic cough    Type 2 diabetes mellitus with hyperglycemia, without long-term current use of insulin    Nontraumatic tear of right rotator cuff    Primary osteoarthritis, right shoulder    Atypical chest pain    Paroxysmal atrial fibrillation    MILLI (obstructive sleep apnea)     Advance Care Planning   Advance Care Planning     Advance Directive is not on file.  ACP discussion was held with the patient during this visit. Patient does not have an advance directive, declines further assistance.     Objective    Vitals:    10/24/24 1035   BP: 120/80   BP Location: Left arm   Patient Position: Sitting   Cuff Size: Adult   Pulse: 80   Temp: 97.5 °F (36.4 °C)   SpO2: 99%   Weight: 91.6 kg (202 lb)   Height: 165.1 cm (65\")   PainSc: 0-No pain     Estimated body mass index is 33.61 kg/m² as calculated from the following:    Height as of this encounter: 165.1 cm (65\").    Weight as of this encounter: 91.6 kg (202 lb).    BMI is >= 30 and <35. (Class 1 Obesity). The following options were offered after discussion;: exercise counseling/recommendations, nutrition counseling/recommendations, and information on healthy weight added to patient's after visit summary       Does the patient have " evidence of cognitive impairment? No    Lab Results   Component Value Date    CHLPL 177 10/17/2024    TRIG 119 10/17/2024    HDL 40 10/17/2024     (H) 10/17/2024    VLDL 22 10/17/2024    HGBA1C 6.20 (H) 10/17/2024        HEALTH RISK ASSESSMENT    Smoking Status:  Social History     Tobacco Use   Smoking Status Never   Smokeless Tobacco Never     Alcohol Consumption:  Social History     Substance and Sexual Activity   Alcohol Use No     Fall Risk Screen:    CANDIADI Fall Risk Assessment was completed, and patient is at LOW risk for falls.Assessment completed on:10/24/2024    Depression Screening:      10/24/2024    10:40 AM   PHQ-2/PHQ-9 Depression Screening   Little interest or pleasure in doing things Not at all   Feeling down, depressed, or hopeless Not at all       Health Habits and Functional and Cognitive Screening:      10/24/2024    10:38 AM   Functional & Cognitive Status   Do you have difficulty preparing food and eating? No   Do you have difficulty bathing yourself, getting dressed or grooming yourself? No   Do you have difficulty using the toilet? No   Do you have difficulty moving around from place to place? No   Do you have trouble with steps or getting out of a bed or a chair? No   Current Diet Unhealthy Diet   Dental Exam Up to date   Eye Exam Up to date   Exercise (times per week) 7 times per week   Current Exercises Include Walking   Do you need help using the phone?  No   Are you deaf or do you have serious difficulty hearing?  No   Do you need help to go to places out of walking distance? No   Do you need help shopping? No   Do you need help preparing meals?  No   Do you need help with housework?  No   Do you need help with laundry? No   Do you need help taking your medications? No   Do you need help managing money? No   Do you ever drive or ride in a car without wearing a seat belt? No   Have you felt unusual stress, anger or loneliness in the last month? No   Who do you live with? Alone    If you need help, do you have trouble finding someone available to you? No   Have you been bothered in the last four weeks by sexual problems? No   Do you have difficulty concentrating, remembering or making decisions? No       Age-appropriate Screening Schedule:  Refer to the list below for future screening recommendations based on patient's age, sex and/or medical conditions. Orders for these recommended tests are listed in the plan section. The patient has been provided with a written plan.    Health Maintenance   Topic Date Due    TDAP/TD VACCINES (2 - Tdap) 03/28/2025 (Originally 10/14/2008)    ZOSTER VACCINE (1 of 2) 04/30/2025 (Originally 3/29/2002)    DIABETIC FOOT EXAM  03/28/2025    URINE MICROALBUMIN  03/28/2025    HEMOGLOBIN A1C  04/17/2025    LIPID PANEL  10/17/2025    DIABETIC EYE EXAM  10/19/2025    ANNUAL WELLNESS VISIT  10/24/2025    BMI FOLLOWUP  10/24/2025    MAMMOGRAM  11/01/2025    DXA SCAN  11/01/2025    COLORECTAL CANCER SCREENING  10/30/2027    HEPATITIS C SCREENING  Completed    INFLUENZA VACCINE  Completed    Pneumococcal Vaccine 65+  Completed    COVID-19 Vaccine  Discontinued    PAP SMEAR  Discontinued                  CMS Preventative Services Quick Reference  Risk Factors Identified During Encounter  Immunizations Discussed/Encouraged: Influenza and COVID19  Dental Screening Recommended  Vision Screening Recommended  The above risks/problems have been discussed with the patient.  Pertinent information has been shared with the patient in the After Visit Summary.  An After Visit Summary and PPPS were made available to the patient.    Follow Up:   Next Medicare Wellness visit to be scheduled in 1 year.       Additional E&M Note during same encounter follows:  Patient has multiple medical problems which are significant and separately identifiable that require additional work above and beyond the Medicare Wellness Visit.      Chief Complaint  Medicare Wellness-subsequent, Establish Care,  "Hypertension, and Hyperlipidemia    Subjective        HPI  Hilaria Klein was a patient of Tennille Lebron PA-C who is no longer with our practice.  I will be taking over this patient's primary care.  This is the first time patient is seeing me.  Last seen Tennille 4/24/24..    She is also being seen today for continued management concerning type 2 diabetes, hypertension, and hypothyroidism.  Since she was last seen,  She is followed by cardiology for management of hypertension and history of atrial fibrillation.  She is on telmisartan 20 mg daily, metoprolol XL 25 mg twice a day.  Along with Eliquis 5 mg twice a day.  Hyperlipidemia: Managed with pravastatin 20 mg daily.  Hypothyroidism: Managed with levothyroxine 112 mcg daily.  GERD: Managed with omeprazole 40 mg daily.    Recently diagnosed with sleep apnea in June.      IGP,rfx Aptima HPV All Pth (09/24/2021 13:52)  DEXA Bone Density Axial (11/01/2023 14:17)    Mammo Screening Digital Tomosynthesis Bilateral With CAD (11/01/2023 14:22)  Stress Test With Myocardial Perfusion One Day (03/20/2024 09:18)    Colonoscopy:  10/30/2017.  Repeat in 10 years?      Eye exam completed at [facility]         Objective   Vital Signs:  /80 (BP Location: Left arm, Patient Position: Sitting, Cuff Size: Adult)   Pulse 80   Temp 97.5 °F (36.4 °C)   Ht 165.1 cm (65\")   Wt 91.6 kg (202 lb)   SpO2 99%   BMI 33.61 kg/m²     Physical Exam  Constitutional:       Appearance: She is well-developed.   HENT:      Head: Normocephalic and atraumatic.      Right Ear: Tympanic membrane normal.      Left Ear: Tympanic membrane normal.   Eyes:      Pupils: Pupils are equal, round, and reactive to light.   Cardiovascular:      Rate and Rhythm: Normal rate and regular rhythm.      Heart sounds: Normal heart sounds. No murmur heard.  Pulmonary:      Effort: Pulmonary effort is normal.      Breath sounds: Normal breath sounds. No wheezing, rhonchi or rales.   Abdominal:      General: " Bowel sounds are normal.      Palpations: Abdomen is soft.      Tenderness: There is no abdominal tenderness.   Musculoskeletal:         General: No tenderness. Normal range of motion.      Cervical back: Normal range of motion and neck supple.   Skin:     General: Skin is warm and dry.      Findings: No erythema or rash.   Neurological:      Mental Status: She is alert and oriented to person, place, and time.   Psychiatric:         Behavior: Behavior normal.          The following data was reviewed by: TIAN Cristina on 10/24/2024:  Common labs          3/20/2024    15:25 3/28/2024    08:17 10/17/2024    09:11   Common Labs   Glucose 152  105  104    BUN 6  14  9    Creatinine 0.83  0.81  0.80    Sodium 134  141  140    Potassium 3.5  4.0  4.8    Chloride 98  100  102    Calcium 8.5  9.1  9.2    Total Protein   7.0    Albumin 3.6   3.8    Total Bilirubin 1.1   0.5    Alkaline Phosphatase 104   103    AST (SGOT) 23   23    ALT (SGPT) 26   20    WBC 14.02  9.8  9.87    Hemoglobin 13.0  11.6  12.6    Hematocrit 40.2  37.1  39.0    Platelets 460  468  442    Total Cholesterol  149  177    Triglycerides  133  119    HDL Cholesterol  26  40    LDL Cholesterol   99  115    Hemoglobin A1C  6.4  6.20    Microalbumin, Urine  <3.0             Summary: Patient has been doing well since she was last seen.  Reviewed recent labs along with patient which all remained stable.  Blood pressure stable at 120/80.  No changes in medications at this time.  Will have her return in 6 months for next recheck appoint with fasting labs.  In the meantime, should contact us sooner for any problems or concerns     Assessment and Plan   Diagnoses and all orders for this visit:    1. Medicare annual wellness visit, subsequent (Primary)    2. Need for influenza vaccination  -     Fluzone High-Dose 65+yrs (0661-0357)  -     Comprehensive Metabolic Panel; Future  -     Lipid Panel With / Chol / HDL Ratio; Future  -     Hemoglobin A1c; Future    3.  Encounter to establish care    4. Essential hypertension  -     CBC (No Diff); Future  -     Comprehensive Metabolic Panel; Future  -     Lipid Panel With / Chol / HDL Ratio; Future  -     TSH Rfx On Abnormal To Free T4; Future    5. Hypercholesterolemia  -     Comprehensive Metabolic Panel; Future  -     Lipid Panel With / Chol / HDL Ratio; Future    6. Acquired hypothyroidism  -     TSH Rfx On Abnormal To Free T4; Future    7. Prediabetes  -     Comprehensive Metabolic Panel; Future  -     Lipid Panel With / Chol / HDL Ratio; Future  -     Hemoglobin A1c; Future    8. MILLI (obstructive sleep apnea)   Managed by sleep medicine.    9. Atrial fibrillation, unspecified type   Managed per cardiology.    10. Gastroesophageal reflux disease with esophagitis without hemorrhage  -     omeprazole (priLOSEC) 40 MG capsule; Take 1 capsule by mouth Daily.  Dispense: 90 capsule; Refill: 1             Follow Up   Return in about 6 months (around 4/24/2025) for Recheck on DM and HTN with fasting labs week before.  .  Patient was given instructions and counseling regarding her condition or for health maintenance advice. Please see specific information pulled into the AVS if appropriate.     Patient Counseling:  --Nutrition: Stressed importance of moderation in sodium/caffeine intake, saturated fat and cholesterol.  Discussed caloric balance, sufficient intake of fresh fruits, vegetables, fiber,   calcium, iron.  --Discussed the new recommendation against daily use of baby aspirin for primary prevention in low risk patients.  --Exercise: Stressed the importance of regular exercise by incorporating into daily routine.    --Substance Abuse: Discussed cessation/primary prevention of tobacco, alcohol, or other drug use; driving or other dangerous activities under the influence.    --Dental health: Discussed importance of regular tooth brushing, flossing, and dental visits.  -- Suggested having eyes and vision checked if needed or past  due.  --Immunizations reviewed.  --Discussed benefits of screening colonoscopy.

## 2024-11-16 DIAGNOSIS — I48.0 PAROXYSMAL ATRIAL FIBRILLATION: ICD-10-CM

## 2024-11-18 RX ORDER — APIXABAN 5 MG/1
5 TABLET, FILM COATED ORAL 2 TIMES DAILY
Qty: 60 TABLET | Refills: 2 | Status: SHIPPED | OUTPATIENT
Start: 2024-11-18

## 2024-12-12 ENCOUNTER — OFFICE VISIT (OUTPATIENT)
Dept: CARDIOLOGY | Facility: CLINIC | Age: 74
End: 2024-12-12
Payer: MEDICARE

## 2024-12-12 VITALS
HEIGHT: 64 IN | SYSTOLIC BLOOD PRESSURE: 132 MMHG | WEIGHT: 200.8 LBS | DIASTOLIC BLOOD PRESSURE: 74 MMHG | HEART RATE: 76 BPM | OXYGEN SATURATION: 99 % | BODY MASS INDEX: 34.28 KG/M2

## 2024-12-12 DIAGNOSIS — G47.33 OSA (OBSTRUCTIVE SLEEP APNEA): ICD-10-CM

## 2024-12-12 DIAGNOSIS — I10 ESSENTIAL HYPERTENSION: Primary | ICD-10-CM

## 2024-12-12 DIAGNOSIS — Z79.01 CHRONIC ANTICOAGULATION: ICD-10-CM

## 2024-12-12 DIAGNOSIS — I48.0 PAROXYSMAL ATRIAL FIBRILLATION: ICD-10-CM

## 2024-12-12 DIAGNOSIS — E03.9 ACQUIRED HYPOTHYROIDISM: ICD-10-CM

## 2024-12-12 DIAGNOSIS — E78.00 HYPERCHOLESTEROLEMIA: ICD-10-CM

## 2024-12-12 RX ORDER — ATORVASTATIN CALCIUM 20 MG/1
20 TABLET, FILM COATED ORAL DAILY
Qty: 90 TABLET | Refills: 3 | Status: SHIPPED | OUTPATIENT
Start: 2024-12-12

## 2024-12-12 NOTE — PROGRESS NOTES
PATIENTINFORMATION    Date of Office Visit: 2024  Encounter Provider: Kunal Wan MD  Place of Service: [facility] CARDIOLOGY  Patient Name: Hilaria Klein  : 1950    Subjective:     Encounter Date:2024      Patient ID: Hilaria Klein is a 74 y.o. female.    Chief Complaint   Patient presents with    Follow-up       HPI  Ms. Klein is a pleasant 74  years old lady who came to cardiology clinic for follow-up visit.  She is compliant with all current medications without significant side effects and denies any ER visit or hospitalization since last clinic visit.  She denies any rest or exertional chest pain, shortness of breath, orthopnea, PND, palpitations, presyncope syncope or extremity swelling.   Blood pressure runs normal during home monitoring.  No known bleeding on Eliquis.    ROS  All systems reviewed and negative except as noted in HPI.    Past Medical History:   Diagnosis Date    Atrial fibrillation     Colon polyp     GERD (gastroesophageal reflux disease)     Goiter     Hypertension     Shoulder dislocation, right, initial encounter     Skin cancer     nose       Past Surgical History:   Procedure Laterality Date    APPENDECTOMY      COLONOSCOPY N/A 10/30/2017    Procedure: COLONOSCOPY; POLYPECTOMY;  Surgeon: Effie Harry MD;  Location: [facility] OR;  Service:     COLONOSCOPY W/ BIOPSIES AND POLYPECTOMY      ENDOSCOPY N/A 10/30/2017    Procedure: ESOPHAGOGASTRODUODENOSCOPY WITH BIOPSY;  Surgeon: Effie Harry MD;  Location: [facility];  Service:     KNEE ARTHROPLASTY Left        Social History     Socioeconomic History    Marital status: Single   Tobacco Use    Smoking status: Never    Smokeless tobacco: Never   Vaping Use    Vaping status: Never Used   Substance and Sexual Activity    Alcohol use: No    Drug use: No    Sexual activity: Defer       Family History   Problem Relation Age of Onset    Hypertension Other     Deep vein thrombosis Other     Breast  "cancer Neg Hx          Procedures       Objective:     /74   Pulse 76   Ht 162.6 cm (64\")   Wt 91.1 kg (200 lb 12.8 oz)   LMP  (LMP Unknown)   SpO2 99%   BMI 34.47 kg/m²  Body mass index is 34.47 kg/m².     Constitutional:       General: Not in acute distress.     Appearance: Well-developed. Not diaphoretic.   Eyes:      Pupils: Pupils are equal, round, and reactive to light.   HENT:      Head: Normocephalic and atraumatic.   Neck:      Thyroid: No thyromegaly.   Pulmonary:      Effort: Pulmonary effort is normal. No respiratory distress.      Breath sounds: Normal breath sounds. No wheezing. No rales.   Chest:      Chest wall: Not tender to palpatation.   Cardiovascular:      Normal rate. Regular rhythm.      No gallop.    Pulses:     Intact distal pulses.   Edema:     Peripheral edema absent.   Abdominal:      General: Bowel sounds are normal. There is no distension.      Palpations: Abdomen is soft.      Tenderness: There is no guarding.   Musculoskeletal: Normal range of motion.         General: No deformity.      Cervical back: Normal range of motion and neck supple. Skin:     General: Skin is warm and dry.      Findings: No rash.   Neurological:      Mental Status: Alert and oriented to person, place, and time.      Cranial Nerves: No cranial nerve deficit.      Deep Tendon Reflexes: Reflexes are normal and symmetric.   Psychiatric:         Judgment: Judgment normal.         Review Of Data: I have reviewed pertinent recent labs, images and documents and pertinent findings included in HPI or assessment below.    Lipid Panel          3/28/2024    08:17 10/17/2024    09:11   Lipid Panel   Total Cholesterol 149  177    Triglycerides 133  119    HDL Cholesterol 26  40    VLDL Cholesterol 24  22    LDL Cholesterol  99  115    LDL/HDL Ratio 3.8           Assessment/Plan:     Paroxysmal atrial fibrillation-symptomatic with palpitations.  48-hour Holter in March 2024 revealed A-fib burden of 43.5%.  On " metoprolol and Eliquis  Essential hypertension on metoprolol and telmisartan  Hypercholesterolemia on pravastatin-LDL slightly above goal  Obstructive sleep apnea-on BiPAP machine  Hypothyroidism on replacement therapy  Prediabetic    Ms. Klein doing very well from cardiac standpoint.  Vital signs within range and she was in sinus rhythm during exam today  I have encouraged her to start exercising/walk regularly  Switched pravastatin to atorvastatin.  Otherwise continue current care  Follow-up in 1 year or sooner with any concerning symptoms.    Diagnosis and plan of care discussed with patient and verbalized understanding.            Your medication list            Accurate as of December 12, 2024 10:17 AM. If you have any questions, ask your nurse or doctor.                CONTINUE taking these medications        Instructions Last Dose Given Next Dose Due   ascorbic acid 500 MG tablet  Commonly known as: VITAMIN C      Take 2 tablets by mouth Daily.       Eliquis 5 MG tablet tablet  Generic drug: apixaban      TAKE 1 TABLET BY MOUTH TWICE DAILY       levothyroxine 112 MCG tablet  Commonly known as: SYNTHROID, LEVOTHROID      TAKE 1 TABLET BY MOUTH DAILY       metoprolol succinate XL 25 MG 24 hr tablet  Commonly known as: TOPROL-XL      Take 1 tablet by mouth 2 (Two) Times a Day.       omeprazole 40 MG capsule  Commonly known as: priLOSEC      Take 1 capsule by mouth Daily.       pravastatin 20 MG tablet  Commonly known as: PRAVACHOL      TAKE 1 TABLET BY MOUTH DAILY       PRENATAL VITAMIN PO      Take  by mouth.       telmisartan 20 MG tablet  Commonly known as: MICARDIS      Take 1 tablet by mouth Daily.       vitamin D3 125 MCG (5000 UT) capsule capsule      Take 1 capsule by mouth Daily.       zinc sulfate 220 (50 Zn) MG capsule  Commonly known as: ZINCATE      Take 50 mg by mouth Daily.                    Kunal Wan MD  12/12/24  10:17 EST

## 2025-01-05 DIAGNOSIS — E11.65 TYPE 2 DIABETES MELLITUS WITH HYPERGLYCEMIA, WITHOUT LONG-TERM CURRENT USE OF INSULIN: ICD-10-CM

## 2025-01-05 DIAGNOSIS — E78.00 HYPERCHOLESTEROLEMIA: ICD-10-CM

## 2025-01-07 RX ORDER — PRAVASTATIN SODIUM 20 MG
20 TABLET ORAL DAILY
Qty: 90 TABLET | Refills: 0 | OUTPATIENT
Start: 2025-01-07

## 2025-01-16 DIAGNOSIS — E03.9 ACQUIRED HYPOTHYROIDISM: ICD-10-CM

## 2025-01-16 RX ORDER — LEVOTHYROXINE SODIUM 112 UG/1
112 TABLET ORAL DAILY
Qty: 30 TABLET | Refills: 3 | Status: SHIPPED | OUTPATIENT
Start: 2025-01-16

## 2025-02-15 DIAGNOSIS — I48.0 PAROXYSMAL ATRIAL FIBRILLATION: ICD-10-CM

## 2025-02-17 RX ORDER — APIXABAN 5 MG/1
5 TABLET, FILM COATED ORAL 2 TIMES DAILY
Qty: 60 TABLET | Refills: 2 | Status: SHIPPED | OUTPATIENT
Start: 2025-02-17

## 2025-03-21 DIAGNOSIS — I10 ESSENTIAL HYPERTENSION: ICD-10-CM

## 2025-03-21 RX ORDER — METOPROLOL SUCCINATE 25 MG/1
25 TABLET, EXTENDED RELEASE ORAL 2 TIMES DAILY
Qty: 180 TABLET | Refills: 3 | Status: SHIPPED | OUTPATIENT
Start: 2025-03-21

## 2025-04-14 DIAGNOSIS — Z23 NEED FOR INFLUENZA VACCINATION: ICD-10-CM

## 2025-04-14 DIAGNOSIS — E78.00 HYPERCHOLESTEROLEMIA: ICD-10-CM

## 2025-04-14 DIAGNOSIS — R73.03 PREDIABETES: ICD-10-CM

## 2025-04-14 DIAGNOSIS — I10 ESSENTIAL HYPERTENSION: ICD-10-CM

## 2025-04-14 DIAGNOSIS — E03.9 ACQUIRED HYPOTHYROIDISM: ICD-10-CM

## 2025-04-15 LAB
ALBUMIN SERPL-MCNC: 3.7 G/DL (ref 3.5–5.2)
ALBUMIN/GLOB SERPL: 1.1 G/DL
ALP SERPL-CCNC: 109 U/L (ref 39–117)
ALT SERPL-CCNC: 14 U/L (ref 1–33)
AST SERPL-CCNC: 19 U/L (ref 1–32)
BILIRUB SERPL-MCNC: 0.4 MG/DL (ref 0–1.2)
BUN SERPL-MCNC: 12 MG/DL (ref 8–23)
BUN/CREAT SERPL: 14.8 (ref 7–25)
CALCIUM SERPL-MCNC: 9.4 MG/DL (ref 8.6–10.5)
CHLORIDE SERPL-SCNC: 102 MMOL/L (ref 98–107)
CHOLEST SERPL-MCNC: 217 MG/DL (ref 0–200)
CHOLEST/HDLC SERPL: 5.43 {RATIO}
CO2 SERPL-SCNC: 26.3 MMOL/L (ref 22–29)
CREAT SERPL-MCNC: 0.81 MG/DL (ref 0.57–1)
EGFRCR SERPLBLD CKD-EPI 2021: 76.8 ML/MIN/1.73
ERYTHROCYTE [DISTWIDTH] IN BLOOD BY AUTOMATED COUNT: 13.3 % (ref 12.3–15.4)
GLOBULIN SER CALC-MCNC: 3.4 GM/DL
GLUCOSE SERPL-MCNC: 99 MG/DL (ref 65–99)
HBA1C MFR BLD: 6.2 % (ref 4.8–5.6)
HCT VFR BLD AUTO: 38.5 % (ref 34–46.6)
HDLC SERPL-MCNC: 40 MG/DL (ref 40–60)
HGB BLD-MCNC: 12.2 G/DL (ref 12–15.9)
LDLC SERPL CALC-MCNC: 151 MG/DL (ref 0–100)
MCH RBC QN AUTO: 27.1 PG (ref 26.6–33)
MCHC RBC AUTO-ENTMCNC: 31.7 G/DL (ref 31.5–35.7)
MCV RBC AUTO: 85.6 FL (ref 79–97)
PLATELET # BLD AUTO: 532 10*3/MM3 (ref 140–450)
POTASSIUM SERPL-SCNC: 4.3 MMOL/L (ref 3.5–5.2)
PROT SERPL-MCNC: 7.1 G/DL (ref 6–8.5)
RBC # BLD AUTO: 4.5 10*6/MM3 (ref 3.77–5.28)
SODIUM SERPL-SCNC: 140 MMOL/L (ref 136–145)
TRIGL SERPL-MCNC: 141 MG/DL (ref 0–150)
TSH SERPL DL<=0.005 MIU/L-ACNC: 2.43 UIU/ML (ref 0.27–4.2)
VLDLC SERPL CALC-MCNC: 26 MG/DL (ref 5–40)
WBC # BLD AUTO: 11.62 10*3/MM3 (ref 3.4–10.8)

## 2025-04-26 NOTE — PROGRESS NOTES
Patient ID: Hilaria Klein is a 75 y.o. female     Patient Care Team:  Head, TIAN Godinez as PCP - General (Nurse Practitioner)    Subjective     Chief Complaint   Patient presents with    Diabetes    Hypertension    Hypothyroidism    Hyperlipidemia    Hand Pain     Both hands, pain in the fingers. X 6 weeks, stopped lipitor thought was causing the pain.        History of Present Illness      History of Present Illness  The patient presents for a 6-month follow-up and had some blood work done.    Pain in the fingers has been experienced since discontinuing atorvastatin approximately 6 to 8 weeks ago. Despite stopping the medication, the pain persists. Swelling is noted in one finger, making it difficult to perform tasks such as fastening a seatbelt. Additionally, swelling in the ankle is reported. Daily fluid intake is approximately 60 ounces, and excessive salt consumption is denied. There is a family history of arthritis, with an aunt diagnosed at a young age who subsequently became severely disabled.    Current medications include metoprolol and Eliquis for atrial fibrillation, with the last cardiology appointment in 12/2024. A BiPAP machine is used regularly, and no breakthrough indigestion or heartburn is reported. Other medications include omeprazole, telmisartan, vitamin C, vitamin D, prenatal vitamins, and zinc.    A diagnosis of prediabetes is noted.    A request is made to change thyroid medication back to this provider, as the cardiologist had previously adjusted the dosage due to a perceived increase in heart rate. The medication is currently renewed every 30 days, but a preference for a 90-day renewal is expressed.    A knee replacement surgery has been undergone on one knee, with an attempt to avoid a similar procedure on the other knee, which occasionally causes discomfort. Compression hose were worn during working years.    PAST SURGICAL HISTORY:  Knee replacement surgery    FAMILY HISTORY  She has a  family history of arthritis, specifically her aunt who had rheumatoid arthritis and was crippled by it, eventually passing away in her 60s.    Results  Labs   - LDL cholesterol: 217   - White count: Slightly elevated   - Hemoglobin A1c: 6.2   - Thyroid function test: Normal   - Glucose level: Good   - Kidney function: Normal   - Liver function tests: Normal   - Hemoglobin: Normal   - Hematocrit: Normal   - Platelet count: 532    Imaging   - Bone density scan: Osteopenia       She denies any complaints of fever, chills, cough, chest pain, shortness of air, abdominal pain, nausea, or any other concerns.     The following portions of the patient's history were reviewed and updated as appropriate: allergies, current medications, past family history, past medical history, past social history, past surgical history and problem list.       ROS    Vitals:    04/28/25 0851   BP: 120/82   Pulse: 83   Temp: 97.5 °F (36.4 °C)   SpO2: 100%       Documented weights    04/28/25 0851   Weight: 92.1 kg (203 lb)     Body mass index is 34.84 kg/m².    Results for orders placed or performed in visit on 04/14/25   Hemoglobin A1c    Collection Time: 04/14/25 10:31 AM    Specimen: Blood   Result Value Ref Range    Hemoglobin A1C 6.20 (H) 4.80 - 5.60 %   TSH Rfx On Abnormal To Free T4    Collection Time: 04/14/25 10:31 AM    Specimen: Blood   Result Value Ref Range    TSH 2.430 0.270 - 4.200 uIU/mL   Lipid Panel With / Chol / HDL Ratio    Collection Time: 04/14/25 10:31 AM    Specimen: Blood   Result Value Ref Range    Total Cholesterol 217 (H) 0 - 200 mg/dL    Triglycerides 141 0 - 150 mg/dL    HDL Cholesterol 40 40 - 60 mg/dL    VLDL Cholesterol Eliecer 26 5 - 40 mg/dL    LDL Chol Calc (NIH) 151 (H) 0 - 100 mg/dL    Chol/HDL Ratio 5.43    Comprehensive Metabolic Panel    Collection Time: 04/14/25 10:31 AM    Specimen: Blood   Result Value Ref Range    Glucose 99 65 - 99 mg/dL    BUN 12 8 - 23 mg/dL    Creatinine 0.81 0.57 - 1.00 mg/dL     EGFR Result 76.8 >60.0 mL/min/1.73    BUN/Creatinine Ratio 14.8 7.0 - 25.0    Sodium 140 136 - 145 mmol/L    Potassium 4.3 3.5 - 5.2 mmol/L    Chloride 102 98 - 107 mmol/L    Total CO2 26.3 22.0 - 29.0 mmol/L    Calcium 9.4 8.6 - 10.5 mg/dL    Total Protein 7.1 6.0 - 8.5 g/dL    Albumin 3.7 3.5 - 5.2 g/dL    Globulin 3.4 gm/dL    A/G Ratio 1.1 g/dL    Total Bilirubin 0.4 0.0 - 1.2 mg/dL    Alkaline Phosphatase 109 39 - 117 U/L    AST (SGOT) 19 1 - 32 U/L    ALT (SGPT) 14 1 - 33 U/L   CBC (No Diff)    Collection Time: 04/14/25 10:31 AM    Specimen: Blood   Result Value Ref Range    WBC 11.62 (H) 3.40 - 10.80 10*3/mm3    RBC 4.50 3.77 - 5.28 10*6/mm3    Hemoglobin 12.2 12.0 - 15.9 g/dL    Hematocrit 38.5 34.0 - 46.6 %    MCV 85.6 79.0 - 97.0 fL    MCH 27.1 26.6 - 33.0 pg    MCHC 31.7 31.5 - 35.7 g/dL    RDW 13.3 12.3 - 15.4 %    Platelets 532 (H) 140 - 450 10*3/mm3           Objective     Physical Exam  Vitals reviewed.   Constitutional:       General: She is not in acute distress.  HENT:      Head: Normocephalic and atraumatic.      Right Ear: Tympanic membrane normal.      Left Ear: Tympanic membrane normal.      Nose: No congestion.      Mouth/Throat:      Pharynx: No posterior oropharyngeal erythema.   Cardiovascular:      Rate and Rhythm: Normal rate and regular rhythm.      Heart sounds: No murmur heard.  Pulmonary:      Effort: Pulmonary effort is normal.      Breath sounds: Normal breath sounds. No wheezing or rhonchi.   Musculoskeletal:         General: Swelling (hands and left ankle) present.      Cervical back: Normal range of motion.   Skin:     Findings: No rash.   Neurological:      General: No focal deficit present.      Mental Status: She is alert.   Psychiatric:         Mood and Affect: Mood normal.         Physical Exam  Mouth/Throat: Mucous membranes moist, no erythema, no exudate  Respiratory: Clear to auscultation, no wheezing, rales or rhonchi  Cardiovascular: Regular rate and rhythm, no murmurs,  rubs, or gallops  Extremities: Swelling noted in fingers and ankle, no pitting edema  Musculoskeletal: Swelling in fingers and ankle, no joint or muscular abnormalities noted    Assessment & Plan     Assessment/Plan     Assessment & Plan  1. Arthritis.  - The patient's LDL levels have escalated, necessitating the resumption of atorvastatin. The observed swelling in her fingers suggests an underlying condition of arthritis.  - Blood pressure readings are within normal limits. The elevated white blood cell count could be indicative of an inflammatory response. The platelet count has consistently been high, with a recent increase to 532, which could also be attributed to inflammation.  - Non-fasting labs will be conducted in 2 to 4 weeks to assess inflammatory markers, rheumatoid factor, and repeat the CBC. She is advised to soak her hands in warm Epsom salts and take extra strength Tylenol daily for 1 to 2 weeks.  - She will resume atorvastatin.    2. Prediabetes.  - The hemoglobin A1c level is 6.2, indicating she is in the prediabetic range.  - Glucose levels are within normal limits.  - She will continue with her current management plan.    3. Thyroid disorder.  - Thyroid function tests are normal.  - She will continue on 112 mcg of thyroid medication daily, with a 90-day supply being sent to [facility].    4. Atrial fibrillation.  - She is currently on metoprolol and Eliquis for atrial fibrillation, with her blood pressure being well-managed.  - She will continue with her current medications.    5. Osteopenia.  - A bone density scan revealed osteopenia.  - A bone density scan will be scheduled.    6. Health maintenance.  - She is due for a mammogram, as her last one was in 2023 and was normal.  - A mammogram will be scheduled.    Follow-up  The patient will follow up in 6 months for an Annual Wellness Visit with fasting labs.    Diagnoses and all orders for this visit:    1. MILLI (obstructive sleep apnea)  (Primary)    2. Acquired hypothyroidism  -     levothyroxine (SYNTHROID, LEVOTHROID) 112 MCG tablet; Take 1 tablet by mouth Daily.  Dispense: 90 tablet; Refill: 1    3. Paroxysmal atrial fibrillation    4. Arthralgia, unspecified joint  -     CBC w MANUAL Differential; Future  -     ELMO Direct Reflex to 11 Biomarker; Future  -     C-reactive Protein; Future  -     Rheumatoid Factor; Future  -     Sedimentation Rate; Future  -     Uric Acid; Future    5. Elevated platelet count  -     CBC w MANUAL Differential; Future    6. Leukocytosis, unspecified type  -     CBC w MANUAL Differential; Future    7. Gastroesophageal reflux disease with esophagitis without hemorrhage  -     omeprazole (priLOSEC) 40 MG capsule; Take 1 capsule by mouth Daily.  Dispense: 90 capsule; Refill: 1          Follow Up:  Return for Non-fasting labs 2-4 weeks and call and return 6 months for AWV with fasting labs.  .    In the meantime, instructed to contact us sooner for any problems or concerns.    Patient was given instructions and counseling regarding condition or for health maintenance advice.  Please see specific information pulled into the AVS if appropriate.      Patient or patient representative verbalized consent for the use of Ambient Listening during the visit with  TIAN Cristina for chart documentation. 4/29/2025  13:36 EDT    TIAN Cristina  Family Medicine  [facility]  04/29/25  13:36 EDT

## 2025-04-28 ENCOUNTER — OFFICE VISIT (OUTPATIENT)
Dept: FAMILY MEDICINE CLINIC | Facility: CLINIC | Age: 75
End: 2025-04-28
Payer: MEDICARE

## 2025-04-28 VITALS
HEART RATE: 83 BPM | SYSTOLIC BLOOD PRESSURE: 120 MMHG | WEIGHT: 203 LBS | DIASTOLIC BLOOD PRESSURE: 82 MMHG | BODY MASS INDEX: 34.66 KG/M2 | OXYGEN SATURATION: 100 % | TEMPERATURE: 97.5 F | HEIGHT: 64 IN

## 2025-04-28 DIAGNOSIS — D72.829 LEUKOCYTOSIS, UNSPECIFIED TYPE: ICD-10-CM

## 2025-04-28 DIAGNOSIS — R79.89 ELEVATED PLATELET COUNT: ICD-10-CM

## 2025-04-28 DIAGNOSIS — K21.00 GASTROESOPHAGEAL REFLUX DISEASE WITH ESOPHAGITIS WITHOUT HEMORRHAGE: ICD-10-CM

## 2025-04-28 DIAGNOSIS — I48.0 PAROXYSMAL ATRIAL FIBRILLATION: ICD-10-CM

## 2025-04-28 DIAGNOSIS — E03.9 ACQUIRED HYPOTHYROIDISM: ICD-10-CM

## 2025-04-28 DIAGNOSIS — G47.33 OSA (OBSTRUCTIVE SLEEP APNEA): Primary | ICD-10-CM

## 2025-04-28 DIAGNOSIS — M25.50 ARTHRALGIA, UNSPECIFIED JOINT: ICD-10-CM

## 2025-04-28 PROBLEM — E11.65 TYPE 2 DIABETES MELLITUS WITH HYPERGLYCEMIA, WITHOUT LONG-TERM CURRENT USE OF INSULIN: Status: RESOLVED | Noted: 2020-03-26 | Resolved: 2025-04-28

## 2025-04-28 RX ORDER — LEVOTHYROXINE SODIUM 112 UG/1
112 TABLET ORAL DAILY
Qty: 90 TABLET | Refills: 1 | Status: SHIPPED | OUTPATIENT
Start: 2025-04-28

## 2025-04-29 RX ORDER — OMEPRAZOLE 40 MG/1
40 CAPSULE, DELAYED RELEASE ORAL DAILY
Qty: 90 CAPSULE | Refills: 1 | Status: SHIPPED | OUTPATIENT
Start: 2025-04-29

## 2025-05-02 DIAGNOSIS — D72.829 LEUKOCYTOSIS, UNSPECIFIED TYPE: ICD-10-CM

## 2025-05-02 DIAGNOSIS — M25.50 ARTHRALGIA, UNSPECIFIED JOINT: ICD-10-CM

## 2025-05-02 DIAGNOSIS — R79.89 ELEVATED PLATELET COUNT: ICD-10-CM

## 2025-05-06 LAB
ANA SER QL: NEGATIVE
BASOPHILS # BLD AUTO: 0.05 10*3/MM3 (ref 0–0.2)
BASOPHILS NFR BLD AUTO: 0.4 % (ref 0–1.5)
CRP SERPL-MCNC: 4.98 MG/DL (ref 0–0.5)
EOSINOPHIL # BLD AUTO: 0.19 10*3/MM3 (ref 0–0.4)
EOSINOPHIL NFR BLD AUTO: 1.5 % (ref 0.3–6.2)
ERYTHROCYTE [DISTWIDTH] IN BLOOD BY AUTOMATED COUNT: 13.2 % (ref 12.3–15.4)
ERYTHROCYTE [SEDIMENTATION RATE] IN BLOOD BY WESTERGREN METHOD: 33 MM/HR (ref 0–30)
HCT VFR BLD AUTO: 38.2 % (ref 34–46.6)
HGB BLD-MCNC: 12.2 G/DL (ref 12–15.9)
IMM GRANULOCYTES # BLD AUTO: 0.11 10*3/MM3 (ref 0–0.05)
IMM GRANULOCYTES NFR BLD AUTO: 0.9 % (ref 0–0.5)
LYMPHOCYTES # BLD AUTO: 1.69 10*3/MM3 (ref 0.7–3.1)
LYMPHOCYTES NFR BLD AUTO: 13.7 % (ref 19.6–45.3)
MCH RBC QN AUTO: 26.8 PG (ref 26.6–33)
MCHC RBC AUTO-ENTMCNC: 31.9 G/DL (ref 31.5–35.7)
MCV RBC AUTO: 83.8 FL (ref 79–97)
MONOCYTES # BLD AUTO: 1.04 10*3/MM3 (ref 0.1–0.9)
MONOCYTES NFR BLD AUTO: 8.4 % (ref 5–12)
NEUTROPHILS # BLD AUTO: 9.3 10*3/MM3 (ref 1.7–7)
NEUTROPHILS NFR BLD AUTO: 75.1 % (ref 42.7–76)
NRBC BLD AUTO-RTO: 0 /100 WBC (ref 0–0.2)
PLATELET # BLD AUTO: 562 10*3/MM3 (ref 140–450)
RBC # BLD AUTO: 4.56 10*6/MM3 (ref 3.77–5.28)
RHEUMATOID FACT SERPL-ACNC: <10 IU/ML
URATE SERPL-MCNC: 7.2 MG/DL (ref 2.4–5.7)
WBC # BLD AUTO: 12.38 10*3/MM3 (ref 3.4–10.8)

## 2025-05-07 ENCOUNTER — TELEPHONE (OUTPATIENT)
Dept: FAMILY MEDICINE CLINIC | Facility: CLINIC | Age: 75
End: 2025-05-07

## 2025-05-07 NOTE — TELEPHONE ENCOUNTER
Caller: Hilaria Klein    Relationship: Self    Best call back number: [phone]    Caller requesting test results: HILARIA KLEIN    What test was performed: LAB    When was the test performed: 05/05/25    Where was the test performed: [facility]    Additional notes: PATIENT IS CALLING TO REQUEST A CALL WITH THE RESULTS OF THE ABOVE LABS.    PLEASE ADVISE.

## 2025-05-08 DIAGNOSIS — E79.0 ELEVATED URIC ACID IN BLOOD: ICD-10-CM

## 2025-05-08 DIAGNOSIS — M25.50 ARTHRALGIA, UNSPECIFIED JOINT: Primary | ICD-10-CM

## 2025-05-08 DIAGNOSIS — D72.829 LEUKOCYTOSIS, UNSPECIFIED TYPE: ICD-10-CM

## 2025-05-08 DIAGNOSIS — R79.89 ELEVATED PLATELET COUNT: ICD-10-CM

## 2025-05-08 RX ORDER — METHYLPREDNISOLONE 4 MG/1
TABLET ORAL
Qty: 21 EACH | Refills: 0 | Status: SHIPPED | OUTPATIENT
Start: 2025-05-08

## 2025-05-16 DIAGNOSIS — I48.0 PAROXYSMAL ATRIAL FIBRILLATION: ICD-10-CM

## 2025-05-16 RX ORDER — APIXABAN 5 MG/1
5 TABLET, FILM COATED ORAL 2 TIMES DAILY
Qty: 180 TABLET | Refills: 3 | Status: SHIPPED | OUTPATIENT
Start: 2025-05-16

## 2025-05-21 RX ORDER — TELMISARTAN 20 MG/1
20 TABLET ORAL DAILY
Qty: 90 TABLET | Refills: 3 | Status: SHIPPED | OUTPATIENT
Start: 2025-05-21

## 2025-05-21 NOTE — TELEPHONE ENCOUNTER
NOV:12/18/2025  LOV: 12/12/2024       Assessment/Plan:      Paroxysmal atrial fibrillation-symptomatic with palpitations.  48-hour Holter in March 2024 revealed A-fib burden of 43.5%.  On metoprolol and Eliquis  Essential hypertension on metoprolol and telmisartan  Hypercholesterolemia on pravastatin-LDL slightly above goal  Obstructive sleep apnea-on BiPAP machine  Hypothyroidism on replacement therapy  Prediabetic     Ms. Klein doing very well from cardiac standpoint.  Vital signs within range and she was in sinus rhythm during exam today  I have encouraged her to start exercising/walk regularly  Switched pravastatin to atorvastatin.  Otherwise continue current care  Follow-up in 1 year or sooner with any concerning symptoms.     Diagnosis and plan of care discussed with       Thanks  Ranid GAXIOLA

## 2025-06-03 DIAGNOSIS — D72.829 LEUKOCYTOSIS, UNSPECIFIED TYPE: ICD-10-CM

## 2025-06-03 DIAGNOSIS — M25.50 ARTHRALGIA, UNSPECIFIED JOINT: ICD-10-CM

## 2025-06-03 DIAGNOSIS — R79.89 ELEVATED PLATELET COUNT: ICD-10-CM

## 2025-06-03 DIAGNOSIS — E79.0 ELEVATED URIC ACID IN BLOOD: ICD-10-CM

## 2025-06-07 LAB
BASOPHILS # BLD AUTO: 0.1 X10E3/UL (ref 0–0.2)
BASOPHILS NFR BLD AUTO: 1 %
CRP SERPL-MCNC: 12 MG/L (ref 0–10)
EOSINOPHIL # BLD AUTO: 0.4 X10E3/UL (ref 0–0.4)
EOSINOPHIL NFR BLD AUTO: 4 %
ERYTHROCYTE [DISTWIDTH] IN BLOOD BY AUTOMATED COUNT: 14.8 % (ref 11.7–15.4)
ERYTHROCYTE [SEDIMENTATION RATE] IN BLOOD BY WESTERGREN METHOD: 11 MM/HR (ref 0–40)
HCT VFR BLD AUTO: 40.6 % (ref 34–46.6)
HGB BLD-MCNC: 12.4 G/DL (ref 11.1–15.9)
IMM GRANULOCYTES # BLD AUTO: 0.1 X10E3/UL (ref 0–0.1)
IMM GRANULOCYTES NFR BLD AUTO: 1 %
LYMPHOCYTES # BLD AUTO: 2 X10E3/UL (ref 0.7–3.1)
LYMPHOCYTES NFR BLD AUTO: 23 %
MCH RBC QN AUTO: 26.6 PG (ref 26.6–33)
MCHC RBC AUTO-ENTMCNC: 30.5 G/DL (ref 31.5–35.7)
MCV RBC AUTO: 87 FL (ref 79–97)
MONOCYTES # BLD AUTO: 0.7 X10E3/UL (ref 0.1–0.9)
MONOCYTES NFR BLD AUTO: 8 %
NEUTROPHILS # BLD AUTO: 5.7 X10E3/UL (ref 1.4–7)
NEUTROPHILS NFR BLD AUTO: 63 %
PLATELET # BLD AUTO: 476 X10E3/UL (ref 150–450)
RBC # BLD AUTO: 4.66 X10E6/UL (ref 3.77–5.28)
URATE SERPL-MCNC: 6.5 MG/DL (ref 3.1–7.9)
WBC # BLD AUTO: 8.9 X10E3/UL (ref 3.4–10.8)

## 2025-06-12 ENCOUNTER — TRANSCRIBE ORDERS (OUTPATIENT)
Dept: ADMINISTRATIVE | Facility: HOSPITAL | Age: 75
End: 2025-06-12
Payer: MEDICARE

## 2025-06-12 DIAGNOSIS — Z12.31 VISIT FOR SCREENING MAMMOGRAM: Primary | ICD-10-CM

## 2025-07-16 ENCOUNTER — HOSPITAL ENCOUNTER (OUTPATIENT)
Dept: MAMMOGRAPHY | Facility: HOSPITAL | Age: 75
Discharge: HOME | End: 2025-07-16
Admitting: NURSE PRACTITIONER
Payer: MEDICARE

## 2025-07-16 DIAGNOSIS — Z12.31 VISIT FOR SCREENING MAMMOGRAM: ICD-10-CM

## 2025-07-16 PROCEDURE — 77063 BREAST TOMOSYNTHESIS BI: CPT

## 2025-07-16 PROCEDURE — 77067 SCR MAMMO BI INCL CAD: CPT

## 2025-07-17 PROCEDURE — 77067 SCR MAMMO BI INCL CAD: CPT | Performed by: RADIOLOGY

## 2025-07-17 PROCEDURE — 77063 BREAST TOMOSYNTHESIS BI: CPT | Performed by: RADIOLOGY

## (undated) DEVICE — Device: Brand: DEFENDO AIR/WATER/SUCTION AND BIOPSY VALVE

## (undated) DEVICE — BW-412T DISP COMBO CLEANING BRUSH: Brand: SINGLE USE COMBINATION CLEANING BRUSH

## (undated) DEVICE — SUCTION CANISTER, 3000CC,SAFELINER: Brand: DEROYAL

## (undated) DEVICE — GLV SURG SENSICARE MICRO PF LF 6 STRL

## (undated) DEVICE — SYR LUER SLPTP 50ML

## (undated) DEVICE — SUCTION CANISTER, 1000CC,SAFELINER: Brand: DEROYAL

## (undated) DEVICE — SPNG GZ WOVN 4X4IN 12PLY 10/BX STRL

## (undated) DEVICE — GOWN ISOL W/THUMB UNIV BLU BX/15

## (undated) DEVICE — THE BITE BLOCK MAXI, LATEX FREE STRAP IS USED TO PROTECT THE ENDOSCOPE INSERTION TUBE FROM BEING BITTEN BY THE PATIENT.

## (undated) DEVICE — FRCP BX RADJAW4 NDL 2.8 240CM LG OG BX40

## (undated) DEVICE — MASK,FACE,SHIELD,BLUE,ANTI FOG,TIES: Brand: MEDLINE

## (undated) DEVICE — Device

## (undated) DEVICE — JACKT LAB KNIT COLR LG BLU

## (undated) DEVICE — SYR LL 3CC

## (undated) DEVICE — MEDI-VAC NON-CONDUCTIVE SUCTION TUBING: Brand: CARDINAL HEALTH

## (undated) DEVICE — VIAL FORMALIN CAP 10P 40ML

## (undated) DEVICE — MEDI-VAC YANK SUCT HNDL W/TPRD BULBOUS TIP: Brand: CARDINAL HEALTH